# Patient Record
Sex: FEMALE | Race: WHITE | Employment: UNEMPLOYED | RURAL
[De-identification: names, ages, dates, MRNs, and addresses within clinical notes are randomized per-mention and may not be internally consistent; named-entity substitution may affect disease eponyms.]

---

## 2017-04-24 ENCOUNTER — OFFICE VISIT (OUTPATIENT)
Dept: PRIMARY CARE CLINIC | Age: 42
End: 2017-04-24
Payer: COMMERCIAL

## 2017-04-24 VITALS
DIASTOLIC BLOOD PRESSURE: 74 MMHG | SYSTOLIC BLOOD PRESSURE: 128 MMHG | OXYGEN SATURATION: 98 % | HEART RATE: 81 BPM | RESPIRATION RATE: 20 BRPM | BODY MASS INDEX: 35.58 KG/M2 | HEIGHT: 70 IN | WEIGHT: 248.5 LBS

## 2017-04-24 DIAGNOSIS — K59.00 CONSTIPATION, UNSPECIFIED CONSTIPATION TYPE: ICD-10-CM

## 2017-04-24 DIAGNOSIS — G89.29 CHRONIC NONINTRACTABLE HEADACHE, UNSPECIFIED HEADACHE TYPE: ICD-10-CM

## 2017-04-24 DIAGNOSIS — F41.9 ANXIETY: ICD-10-CM

## 2017-04-24 DIAGNOSIS — R51.9 CHRONIC NONINTRACTABLE HEADACHE, UNSPECIFIED HEADACHE TYPE: ICD-10-CM

## 2017-04-24 DIAGNOSIS — F32.89 OTHER DEPRESSION: Primary | ICD-10-CM

## 2017-04-24 PROCEDURE — 99213 OFFICE O/P EST LOW 20 MIN: CPT | Performed by: PEDIATRICS

## 2017-04-24 RX ORDER — BUPROPION HYDROCHLORIDE 150 MG/1
150 TABLET ORAL EVERY MORNING
Qty: 30 TABLET | Refills: 3 | Status: SHIPPED | OUTPATIENT
Start: 2017-04-24 | End: 2017-07-18 | Stop reason: SDUPTHER

## 2017-04-24 RX ORDER — CITALOPRAM 40 MG/1
40 TABLET ORAL DAILY
Qty: 30 TABLET | Refills: 5 | Status: SHIPPED | OUTPATIENT
Start: 2017-04-24 | End: 2017-07-18 | Stop reason: SDUPTHER

## 2017-04-24 RX ORDER — NAPROXEN 500 MG/1
500 TABLET ORAL EVERY 12 HOURS PRN
Qty: 30 TABLET | Refills: 3 | Status: SHIPPED | OUTPATIENT
Start: 2017-04-24 | End: 2018-05-10

## 2017-04-24 RX ORDER — POLYETHYLENE GLYCOL 3350 17 G/17G
17 POWDER, FOR SOLUTION ORAL DAILY
Qty: 1 BOTTLE | Refills: 5 | Status: SHIPPED | OUTPATIENT
Start: 2017-04-24

## 2017-04-24 ASSESSMENT — ENCOUNTER SYMPTOMS
VOMITING: 0
CONSTIPATION: 1
WHEEZING: 0
SCALP TENDERNESS: 0
SHORTNESS OF BREATH: 0
HEMATOCHEZIA: 0
NAUSEA: 0
COUGH: 0
BACK PAIN: 0
SORE THROAT: 0
SINUS PRESSURE: 0
EYE DISCHARGE: 0
ABDOMINAL PAIN: 0

## 2017-07-18 ENCOUNTER — OFFICE VISIT (OUTPATIENT)
Dept: PRIMARY CARE CLINIC | Age: 42
End: 2017-07-18
Payer: COMMERCIAL

## 2017-07-18 VITALS
DIASTOLIC BLOOD PRESSURE: 76 MMHG | HEART RATE: 79 BPM | RESPIRATION RATE: 20 BRPM | TEMPERATURE: 98 F | WEIGHT: 246 LBS | SYSTOLIC BLOOD PRESSURE: 100 MMHG | BODY MASS INDEX: 35.3 KG/M2 | OXYGEN SATURATION: 97 %

## 2017-07-18 DIAGNOSIS — F32.89 OTHER DEPRESSION: ICD-10-CM

## 2017-07-18 DIAGNOSIS — F41.9 ANXIETY: ICD-10-CM

## 2017-07-18 DIAGNOSIS — R51.9 NONINTRACTABLE EPISODIC HEADACHE, UNSPECIFIED HEADACHE TYPE: Primary | ICD-10-CM

## 2017-07-18 PROBLEM — F32.A DEPRESSION: Status: ACTIVE | Noted: 2017-07-18

## 2017-07-18 PROCEDURE — 99213 OFFICE O/P EST LOW 20 MIN: CPT | Performed by: PEDIATRICS

## 2017-07-18 RX ORDER — CITALOPRAM 40 MG/1
40 TABLET ORAL DAILY
Qty: 30 TABLET | Refills: 5 | Status: SHIPPED | OUTPATIENT
Start: 2017-07-18

## 2017-07-18 RX ORDER — BUPROPION HYDROCHLORIDE 150 MG/1
150 TABLET ORAL EVERY MORNING
Qty: 30 TABLET | Refills: 3 | Status: SHIPPED | OUTPATIENT
Start: 2017-07-18 | End: 2018-02-07 | Stop reason: SDUPTHER

## 2017-07-18 ASSESSMENT — ENCOUNTER SYMPTOMS
HEMATOCHEZIA: 0
CONSTIPATION: 1
SCALP TENDERNESS: 0
SHORTNESS OF BREATH: 0
BACK PAIN: 0
WHEEZING: 0
SINUS PRESSURE: 0
EYE DISCHARGE: 0

## 2017-07-18 ASSESSMENT — PATIENT HEALTH QUESTIONNAIRE - PHQ9
2. FEELING DOWN, DEPRESSED OR HOPELESS: 0
SUM OF ALL RESPONSES TO PHQ9 QUESTIONS 1 & 2: 0
SUM OF ALL RESPONSES TO PHQ QUESTIONS 1-9: 0
1. LITTLE INTEREST OR PLEASURE IN DOING THINGS: 0

## 2018-02-07 ENCOUNTER — TELEPHONE (OUTPATIENT)
Dept: PRIMARY CARE CLINIC | Age: 43
End: 2018-02-07

## 2018-02-07 DIAGNOSIS — F32.89 OTHER DEPRESSION: ICD-10-CM

## 2018-02-07 RX ORDER — BUPROPION HYDROCHLORIDE 150 MG/1
150 TABLET ORAL EVERY MORNING
Qty: 30 TABLET | Refills: 0 | Status: SHIPPED | OUTPATIENT
Start: 2018-02-07 | End: 2018-05-10

## 2018-02-07 NOTE — TELEPHONE ENCOUNTER
Pt is wanting refill on Wellbutrin. I explained that she needs to be seen first, but pt stated that her nor her kids have been sick with the flu and that she is not coming in to be seen until some of the sickness has calmed down.

## 2018-05-10 ENCOUNTER — OFFICE VISIT (OUTPATIENT)
Dept: PRIMARY CARE CLINIC | Age: 43
End: 2018-05-10
Payer: COMMERCIAL

## 2018-05-10 VITALS
RESPIRATION RATE: 20 BRPM | OXYGEN SATURATION: 98 % | HEART RATE: 68 BPM | SYSTOLIC BLOOD PRESSURE: 124 MMHG | DIASTOLIC BLOOD PRESSURE: 66 MMHG | WEIGHT: 255 LBS | HEIGHT: 70 IN | BODY MASS INDEX: 36.51 KG/M2

## 2018-05-10 DIAGNOSIS — F41.9 ANXIETY: Primary | ICD-10-CM

## 2018-05-10 DIAGNOSIS — F32.89 OTHER DEPRESSION: ICD-10-CM

## 2018-05-10 DIAGNOSIS — R63.5 WEIGHT GAIN: ICD-10-CM

## 2018-05-10 PROCEDURE — 99213 OFFICE O/P EST LOW 20 MIN: CPT | Performed by: PEDIATRICS

## 2018-05-10 ASSESSMENT — ENCOUNTER SYMPTOMS
BACK PAIN: 0
SINUS PRESSURE: 0
COUGH: 0
WHEEZING: 0
ABDOMINAL PAIN: 0
EYE DISCHARGE: 0
NAUSEA: 0
SHORTNESS OF BREATH: 0
VOMITING: 0
SORE THROAT: 0

## 2018-05-22 ENCOUNTER — OFFICE VISIT (OUTPATIENT)
Dept: PRIMARY CARE CLINIC | Age: 43
End: 2018-05-22
Payer: COMMERCIAL

## 2018-05-22 VITALS
BODY MASS INDEX: 36.51 KG/M2 | SYSTOLIC BLOOD PRESSURE: 121 MMHG | HEART RATE: 75 BPM | HEIGHT: 70 IN | OXYGEN SATURATION: 98 % | DIASTOLIC BLOOD PRESSURE: 72 MMHG | WEIGHT: 255 LBS

## 2018-05-22 DIAGNOSIS — F41.9 ANXIETY: Primary | ICD-10-CM

## 2018-05-22 DIAGNOSIS — E66.9 CLASS 2 OBESITY WITHOUT SERIOUS COMORBIDITY WITH BODY MASS INDEX (BMI) OF 36.0 TO 36.9 IN ADULT, UNSPECIFIED OBESITY TYPE: ICD-10-CM

## 2018-05-22 PROCEDURE — 99213 OFFICE O/P EST LOW 20 MIN: CPT | Performed by: INTERNAL MEDICINE

## 2018-05-22 ASSESSMENT — ENCOUNTER SYMPTOMS
SHORTNESS OF BREATH: 0
SINUS PRESSURE: 0
COUGH: 0
EYE DISCHARGE: 0
ABDOMINAL PAIN: 0
WHEEZING: 0
BACK PAIN: 0
VOMITING: 0
SORE THROAT: 0
NAUSEA: 0

## 2018-06-12 ENCOUNTER — TELEPHONE (OUTPATIENT)
Dept: PRIMARY CARE CLINIC | Age: 43
End: 2018-06-12

## 2018-06-12 DIAGNOSIS — Z12.31 ENCOUNTER FOR SCREENING MAMMOGRAM FOR BREAST CANCER: Primary | ICD-10-CM

## 2018-06-18 ENCOUNTER — HOSPITAL ENCOUNTER (OUTPATIENT)
Dept: GENERAL RADIOLOGY | Age: 43
Discharge: HOME | End: 2018-06-18
Attending: INTERNAL MEDICINE | Admitting: INTERNAL MEDICINE

## 2018-06-18 DIAGNOSIS — Z12.31 ENCOUNTER FOR SCREENING MAMMOGRAM FOR BREAST CANCER: ICD-10-CM

## 2018-12-03 ENCOUNTER — HOSPITAL ENCOUNTER (OUTPATIENT)
Facility: HOSPITAL | Age: 43
Discharge: HOME | End: 2018-12-03
Payer: COMMERCIAL

## 2018-12-03 LAB
A/G RATIO: 1.8 (ref 0.8–2)
ALBUMIN SERPL-MCNC: 4.2 G/DL (ref 3.4–4.8)
ALP BLD-CCNC: 61 U/L (ref 25–100)
ALT SERPL-CCNC: 12 U/L (ref 4–36)
ANION GAP SERPL CALCULATED.3IONS-SCNC: 11 MMOL/L (ref 3–16)
AST SERPL-CCNC: 14 U/L (ref 8–33)
BILIRUB SERPL-MCNC: <0.2 MG/DL (ref 0.3–1.2)
BUN BLDV-MCNC: 14 MG/DL (ref 6–20)
CALCIUM SERPL-MCNC: 9.3 MG/DL (ref 8.5–10.5)
CHLORIDE BLD-SCNC: 103 MMOL/L (ref 98–107)
CO2: 27 MMOL/L (ref 20–30)
CREAT SERPL-MCNC: 0.6 MG/DL (ref 0.4–1.2)
FERRITIN: 141.3 NG/ML (ref 22–322)
GFR AFRICAN AMERICAN: >59
GFR NON-AFRICAN AMERICAN: >60
GLOBULIN: 2.4 G/DL
GLUCOSE BLD-MCNC: 109 MG/DL (ref 74–106)
HCT VFR BLD CALC: 42.7 % (ref 37–47)
HEMOGLOBIN: 13.7 G/DL (ref 11.5–16.5)
IRON: 40 UG/DL (ref 37–145)
MCH RBC QN AUTO: 31 PG (ref 27–32)
MCHC RBC AUTO-ENTMCNC: 32.1 G/DL (ref 31–35)
MCV RBC AUTO: 96.6 FL (ref 80–100)
PDW BLD-RTO: 12.4 % (ref 11–16)
PLATELET # BLD: 302 K/UL (ref 150–400)
PMV BLD AUTO: 9.7 FL (ref 6–10)
POTASSIUM SERPL-SCNC: 3.9 MMOL/L (ref 3.4–5.1)
RBC # BLD: 4.42 M/UL (ref 3.8–5.8)
SODIUM BLD-SCNC: 141 MMOL/L (ref 136–145)
TOTAL PROTEIN: 6.6 G/DL (ref 6.4–8.3)
VITAMIN D 25-HYDROXY: 33.4 (ref 32–100)
WBC # BLD: 6.6 K/UL (ref 4–11)

## 2018-12-03 PROCEDURE — 83921 ORGANIC ACID SINGLE QUANT: CPT

## 2018-12-03 PROCEDURE — 85027 COMPLETE CBC AUTOMATED: CPT

## 2018-12-03 PROCEDURE — 83970 ASSAY OF PARATHORMONE: CPT

## 2018-12-03 PROCEDURE — 83540 ASSAY OF IRON: CPT

## 2018-12-03 PROCEDURE — 82747 ASSAY OF FOLIC ACID RBC: CPT

## 2018-12-03 PROCEDURE — 36415 COLL VENOUS BLD VENIPUNCTURE: CPT

## 2018-12-03 PROCEDURE — 80053 COMPREHEN METABOLIC PANEL: CPT

## 2018-12-03 PROCEDURE — 82306 VITAMIN D 25 HYDROXY: CPT

## 2018-12-03 PROCEDURE — 82728 ASSAY OF FERRITIN: CPT

## 2018-12-04 LAB — PARATHYROID HORMONE INTACT: 77.7 PG/ML (ref 14–72)

## 2018-12-07 LAB
HCT VFR BLD CALC: 42.7 %
METHYLMALONIC ACID: 0.25 UMOL/L (ref 0–0.4)
RBC FOLATE: 367 NG/ML

## 2020-12-18 ENCOUNTER — HOSPITAL ENCOUNTER (OUTPATIENT)
Facility: HOSPITAL | Age: 45
Discharge: HOME | End: 2020-12-18
Payer: COMMERCIAL

## 2020-12-18 LAB
A/G RATIO: 1.9 (ref 0.8–2)
ALBUMIN SERPL-MCNC: 4 G/DL (ref 3.4–4.8)
ALP BLD-CCNC: 56 U/L (ref 25–100)
ALT SERPL-CCNC: 13 U/L (ref 4–36)
ANION GAP SERPL CALCULATED.3IONS-SCNC: 7 MMOL/L (ref 3–16)
AST SERPL-CCNC: 13 U/L (ref 8–33)
BASOPHILS ABSOLUTE: 0 K/UL (ref 0–0.1)
BASOPHILS RELATIVE PERCENT: 0.4 %
BILIRUB SERPL-MCNC: 0.3 MG/DL (ref 0.3–1.2)
BUN BLDV-MCNC: 9 MG/DL (ref 6–20)
CALCIUM SERPL-MCNC: 8.9 MG/DL (ref 8.5–10.5)
CHLORIDE BLD-SCNC: 102 MMOL/L (ref 98–107)
CHOLESTEROL, TOTAL: 218 MG/DL (ref 0–200)
CO2: 31 MMOL/L (ref 20–30)
CREAT SERPL-MCNC: 0.6 MG/DL (ref 0.4–1.2)
EOSINOPHILS ABSOLUTE: 0.1 K/UL (ref 0–0.4)
EOSINOPHILS RELATIVE PERCENT: 1.3 %
FOLATE: 6.41 NG/ML
GFR AFRICAN AMERICAN: >59
GFR NON-AFRICAN AMERICAN: >60
GLOBULIN: 2.1 G/DL
GLUCOSE BLD-MCNC: 87 MG/DL (ref 74–106)
HBA1C MFR BLD: 4.8 %
HCT VFR BLD CALC: 42.8 % (ref 37–47)
HDLC SERPL-MCNC: 44 MG/DL (ref 40–60)
HEMOGLOBIN: 14.3 G/DL (ref 11.5–16.5)
IMMATURE GRANULOCYTES #: 0 K/UL
IMMATURE GRANULOCYTES %: 0.2 % (ref 0–5)
LDL CHOLESTEROL CALCULATED: 141 MG/DL
LYMPHOCYTES ABSOLUTE: 1.3 K/UL (ref 1.5–4)
LYMPHOCYTES RELATIVE PERCENT: 27.6 %
MCH RBC QN AUTO: 32.4 PG (ref 27–32)
MCHC RBC AUTO-ENTMCNC: 33.4 G/DL (ref 31–35)
MCV RBC AUTO: 96.8 FL (ref 80–100)
MONOCYTES ABSOLUTE: 0.3 K/UL (ref 0.2–0.8)
MONOCYTES RELATIVE PERCENT: 5.7 %
NEUTROPHILS ABSOLUTE: 3.1 K/UL (ref 2–7.5)
NEUTROPHILS RELATIVE PERCENT: 64.8 %
PDW BLD-RTO: 12.3 % (ref 11–16)
PLATELET # BLD: 230 K/UL (ref 150–400)
PMV BLD AUTO: 9.2 FL (ref 6–10)
POTASSIUM SERPL-SCNC: 3.6 MMOL/L (ref 3.4–5.1)
RBC # BLD: 4.42 M/UL (ref 3.8–5.8)
RHEUMATOID FACTOR: <10 IU/ML
SEDIMENTATION RATE, ERYTHROCYTE: 10 MM/HR (ref 0–20)
SODIUM BLD-SCNC: 140 MMOL/L (ref 136–145)
TOTAL PROTEIN: 6.1 G/DL (ref 6.4–8.3)
TRIGL SERPL-MCNC: 165 MG/DL (ref 0–249)
TSH SERPL DL<=0.05 MIU/L-ACNC: 2.37 UIU/ML (ref 0.27–4.2)
URIC ACID, SERUM: 4.9 MG/DL (ref 2.5–7.1)
VITAMIN B-12: 764 PG/ML (ref 211–911)
VLDLC SERPL CALC-MCNC: 33 MG/DL
WBC # BLD: 4.7 K/UL (ref 4–11)

## 2020-12-18 PROCEDURE — 80061 LIPID PANEL: CPT

## 2020-12-18 PROCEDURE — 83036 HEMOGLOBIN GLYCOSYLATED A1C: CPT

## 2020-12-18 PROCEDURE — 80053 COMPREHEN METABOLIC PANEL: CPT

## 2020-12-18 PROCEDURE — 82607 VITAMIN B-12: CPT

## 2020-12-18 PROCEDURE — 86038 ANTINUCLEAR ANTIBODIES: CPT

## 2020-12-18 PROCEDURE — 85652 RBC SED RATE AUTOMATED: CPT

## 2020-12-18 PROCEDURE — 84550 ASSAY OF BLOOD/URIC ACID: CPT

## 2020-12-18 PROCEDURE — 85025 COMPLETE CBC W/AUTO DIFF WBC: CPT

## 2020-12-18 PROCEDURE — 82746 ASSAY OF FOLIC ACID SERUM: CPT

## 2020-12-18 PROCEDURE — 84443 ASSAY THYROID STIM HORMONE: CPT

## 2020-12-18 PROCEDURE — 86431 RHEUMATOID FACTOR QUANT: CPT

## 2020-12-18 PROCEDURE — 36415 COLL VENOUS BLD VENIPUNCTURE: CPT

## 2020-12-19 LAB — ANTI-NUCLEAR ANTIBODY (ANA): NEGATIVE

## 2021-04-21 ENCOUNTER — APPOINTMENT (OUTPATIENT)
Dept: CT IMAGING | Facility: HOSPITAL | Age: 46
End: 2021-04-21
Payer: COMMERCIAL

## 2021-04-21 ENCOUNTER — HOSPITAL ENCOUNTER (EMERGENCY)
Facility: HOSPITAL | Age: 46
Discharge: HOME | End: 2021-04-21
Attending: EMERGENCY MEDICINE
Payer: COMMERCIAL

## 2021-04-21 ENCOUNTER — APPOINTMENT (OUTPATIENT)
Dept: GENERAL RADIOLOGY | Facility: HOSPITAL | Age: 46
End: 2021-04-21
Payer: COMMERCIAL

## 2021-04-21 VITALS
DIASTOLIC BLOOD PRESSURE: 55 MMHG | WEIGHT: 200 LBS | HEART RATE: 67 BPM | RESPIRATION RATE: 16 BRPM | HEIGHT: 70 IN | SYSTOLIC BLOOD PRESSURE: 109 MMHG | BODY MASS INDEX: 28.63 KG/M2 | OXYGEN SATURATION: 97 % | TEMPERATURE: 97.7 F

## 2021-04-21 DIAGNOSIS — R07.9 CHEST PAIN, UNSPECIFIED TYPE: Primary | ICD-10-CM

## 2021-04-21 DIAGNOSIS — R10.13 ABDOMINAL PAIN, EPIGASTRIC: ICD-10-CM

## 2021-04-21 LAB
A/G RATIO: 1.4 (ref 0.8–2)
ALBUMIN SERPL-MCNC: 3.8 G/DL (ref 3.4–4.8)
ALP BLD-CCNC: 53 U/L (ref 25–100)
ALT SERPL-CCNC: 10 U/L (ref 4–36)
ANION GAP SERPL CALCULATED.3IONS-SCNC: 12 MMOL/L (ref 3–16)
AST SERPL-CCNC: 14 U/L (ref 8–33)
BASOPHILS ABSOLUTE: 0 K/UL (ref 0–0.1)
BASOPHILS RELATIVE PERCENT: 0.3 %
BILIRUB SERPL-MCNC: <0.2 MG/DL (ref 0.3–1.2)
BUN BLDV-MCNC: 12 MG/DL (ref 6–20)
CALCIUM SERPL-MCNC: 9.4 MG/DL (ref 8.5–10.5)
CHLORIDE BLD-SCNC: 101 MMOL/L (ref 98–107)
CO2: 25 MMOL/L (ref 20–30)
CREAT SERPL-MCNC: 0.7 MG/DL (ref 0.4–1.2)
D DIMER: 0.52 UG/ML FEU (ref 0–0.6)
EOSINOPHILS ABSOLUTE: 0.1 K/UL (ref 0–0.4)
EOSINOPHILS RELATIVE PERCENT: 0.8 %
GFR AFRICAN AMERICAN: >59
GFR NON-AFRICAN AMERICAN: >60
GLOBULIN: 2.7 G/DL
GLUCOSE BLD-MCNC: 140 MG/DL (ref 74–106)
HCT VFR BLD CALC: 39.2 % (ref 37–47)
HEMOGLOBIN: 13.2 G/DL (ref 11.5–16.5)
IMMATURE GRANULOCYTES #: 0 K/UL
IMMATURE GRANULOCYTES %: 0.3 % (ref 0–5)
INR BLD: 0.92 (ref 0.88–1.11)
LIPASE: 25 U/L (ref 5.6–51.3)
LYMPHOCYTES ABSOLUTE: 1.9 K/UL (ref 1.5–4)
LYMPHOCYTES RELATIVE PERCENT: 19.5 %
MCH RBC QN AUTO: 32 PG (ref 27–32)
MCHC RBC AUTO-ENTMCNC: 33.7 G/DL (ref 31–35)
MCV RBC AUTO: 95.1 FL (ref 80–100)
MONOCYTES ABSOLUTE: 0.7 K/UL (ref 0.2–0.8)
MONOCYTES RELATIVE PERCENT: 6.6 %
NEUTROPHILS ABSOLUTE: 7.1 K/UL (ref 2–7.5)
NEUTROPHILS RELATIVE PERCENT: 72.5 %
PDW BLD-RTO: 12.4 % (ref 11–16)
PLATELET # BLD: 253 K/UL (ref 150–400)
PMV BLD AUTO: 9.3 FL (ref 6–10)
POTASSIUM SERPL-SCNC: 3.2 MMOL/L (ref 3.4–5.1)
PRO-BNP: 97 PG/ML (ref 0–900)
PROTHROMBIN TIME: 12 SEC (ref 11.6–13.8)
RBC # BLD: 4.12 M/UL (ref 3.8–5.8)
SODIUM BLD-SCNC: 138 MMOL/L (ref 136–145)
TOTAL PROTEIN: 6.5 G/DL (ref 6.4–8.3)
TROPONIN: <0.3 NG/ML
WBC # BLD: 9.8 K/UL (ref 4–11)

## 2021-04-21 PROCEDURE — 71045 X-RAY EXAM CHEST 1 VIEW: CPT

## 2021-04-21 PROCEDURE — 83690 ASSAY OF LIPASE: CPT

## 2021-04-21 PROCEDURE — 6360000002 HC RX W HCPCS: Performed by: EMERGENCY MEDICINE

## 2021-04-21 PROCEDURE — 85025 COMPLETE CBC W/AUTO DIFF WBC: CPT

## 2021-04-21 PROCEDURE — 74176 CT ABD & PELVIS W/O CONTRAST: CPT

## 2021-04-21 PROCEDURE — 84484 ASSAY OF TROPONIN QUANT: CPT

## 2021-04-21 PROCEDURE — 85610 PROTHROMBIN TIME: CPT

## 2021-04-21 PROCEDURE — 96375 TX/PRO/DX INJ NEW DRUG ADDON: CPT

## 2021-04-21 PROCEDURE — 2580000003 HC RX 258: Performed by: EMERGENCY MEDICINE

## 2021-04-21 PROCEDURE — 96374 THER/PROPH/DIAG INJ IV PUSH: CPT

## 2021-04-21 PROCEDURE — 36415 COLL VENOUS BLD VENIPUNCTURE: CPT

## 2021-04-21 PROCEDURE — 85379 FIBRIN DEGRADATION QUANT: CPT

## 2021-04-21 PROCEDURE — 80053 COMPREHEN METABOLIC PANEL: CPT

## 2021-04-21 PROCEDURE — 6370000000 HC RX 637 (ALT 250 FOR IP): Performed by: EMERGENCY MEDICINE

## 2021-04-21 PROCEDURE — 99284 EMERGENCY DEPT VISIT MOD MDM: CPT

## 2021-04-21 PROCEDURE — 83880 ASSAY OF NATRIURETIC PEPTIDE: CPT

## 2021-04-21 RX ORDER — MORPHINE SULFATE 4 MG/ML
4 INJECTION, SOLUTION INTRAMUSCULAR; INTRAVENOUS ONCE
Status: COMPLETED | OUTPATIENT
Start: 2021-04-21 | End: 2021-04-21

## 2021-04-21 RX ORDER — ASPIRIN 81 MG/1
324 TABLET, CHEWABLE ORAL ONCE
Status: COMPLETED | OUTPATIENT
Start: 2021-04-21 | End: 2021-04-21

## 2021-04-21 RX ORDER — ONDANSETRON 2 MG/ML
4 INJECTION INTRAMUSCULAR; INTRAVENOUS ONCE
Status: COMPLETED | OUTPATIENT
Start: 2021-04-21 | End: 2021-04-21

## 2021-04-21 RX ORDER — 0.9 % SODIUM CHLORIDE 0.9 %
1000 INTRAVENOUS SOLUTION INTRAVENOUS ONCE
Status: COMPLETED | OUTPATIENT
Start: 2021-04-21 | End: 2021-04-21

## 2021-04-21 RX ORDER — DROPERIDOL 2.5 MG/ML
1.25 INJECTION, SOLUTION INTRAMUSCULAR; INTRAVENOUS ONCE
Status: COMPLETED | OUTPATIENT
Start: 2021-04-21 | End: 2021-04-21

## 2021-04-21 RX ADMIN — SODIUM CHLORIDE 1000 ML: 9 INJECTION, SOLUTION INTRAVENOUS at 01:29

## 2021-04-21 RX ADMIN — MORPHINE SULFATE 4 MG: 4 INJECTION, SOLUTION INTRAMUSCULAR; INTRAVENOUS at 01:30

## 2021-04-21 RX ADMIN — LIDOCAINE HYDROCHLORIDE: 20 SOLUTION ORAL; TOPICAL at 01:47

## 2021-04-21 RX ADMIN — DROPERIDOL 1.25 MG: 2.5 INJECTION, SOLUTION INTRAMUSCULAR; INTRAVENOUS at 02:14

## 2021-04-21 RX ADMIN — ASPIRIN 324 MG: 81 TABLET, CHEWABLE ORAL at 01:29

## 2021-04-21 RX ADMIN — ONDANSETRON 4 MG: 2 INJECTION INTRAMUSCULAR; INTRAVENOUS at 01:30

## 2021-04-21 ASSESSMENT — PAIN DESCRIPTION - ORIENTATION
ORIENTATION: MID

## 2021-04-21 ASSESSMENT — PAIN SCALES - GENERAL
PAINLEVEL_OUTOF10: 10

## 2021-04-21 ASSESSMENT — PAIN DESCRIPTION - FREQUENCY: FREQUENCY: CONTINUOUS

## 2021-04-21 ASSESSMENT — PAIN DESCRIPTION - LOCATION
LOCATION: ABDOMEN
LOCATION: ABDOMEN

## 2021-04-21 ASSESSMENT — PAIN DESCRIPTION - PAIN TYPE
TYPE: ACUTE PAIN

## 2021-04-21 ASSESSMENT — PAIN DESCRIPTION - DESCRIPTORS
DESCRIPTORS: SHOOTING
DESCRIPTORS: SHOOTING

## 2021-04-21 NOTE — ED PROVIDER NOTES
[address] [place] [address] Court  eMERGENCY dEPARTMENT eNCOUnter      Pt Name: Jacquelyn Forrest  MRN: [id number]  [place]: 1975  Date of evaluation: 2/67/7805  Provider: Christian Junior MD    [address] [place] [address]       Chief Complaint   Patient presents with    Emesis    Chest Pain     mid-sternal to epigastric area         HISTORY OF PRESENT ILLNESS  (Location/Symptom, Timing/Onset, Context/Setting, Quality, Duration,Modifying Factors, Severity.)   Jacquelyn Forrest is a 50 y.o. female who presents to the emergency department with the onset of chest pain that started 2 hours prior to arrival.  Pain is been constant since the onset. Pain is \"sharp\" in nature. Pain radiated to her back between her shoulder blades. She had associated shortness of breath, nausea, and was dry heaving. No similar symptoms in the past.    No prior cardiac stress test or cath. No hypertension  No diabetes  No high cholesterol  No smoking  No premature cardiac history. Patient has a history of a vertical sleeve gastrectomy. Nursing notes were reviewed. REVIEW OF SYSTEMS    (2-9 systems for level 4, 10 or more for level 5)   ROS:  General:  No fevers, no chills, no weakness  Cardiovascular:  + chest pain, no palpitations  Respiratory:  + shortness of breath, no cough, no wheezing  Gastrointestinal:  + pain, + nausea, + dry heaves; no vomiting, no diarrhea  Musculoskeletal:  No muscle pain, no joint pain  Skin:  No rash, no easy bruising  Neurologic:  No speech problems, no headache, no extremity numbness, no extremity  tingling, no extremity weakness  Psychiatric:  + anxiety  Genitourinary:  No dysuria, no hematuria    Except as noted above the remainder of the review of systems was reviewed and negative.        PAST MEDICAL HISTORY     Past Medical History:   Diagnosis Date    Anxiety          SURGICAL HISTORY       Past Surgical History:   Procedure Laterality Date    APPENDECTOMY      HYSTERECTOMY      partial  KNEE SURGERY Bilateral     TYMPANOSTOMY TUBE PLACEMENT Bilateral          CURRENT MEDICATIONS       Previous Medications    CITALOPRAM (CELEXA) 40 MG TABLET    Take 1 tablet by mouth daily    POLYETHYLENE GLYCOL (GLYCOLAX) POWDER    Take 17 g by mouth daily       ALLERGIES     Patient has no known allergies.     FAMILY HISTORY       Family History   Problem Relation Age of Onset    Heart Disease Brother         enlarged heart    Cancer Maternal Grandfather           SOCIAL HISTORY       Social History     Socioeconomic History    Marital status:      Spouse name: None    Number of children: None    Years of education: None    Highest education level: None   Occupational History    None   Social Needs    Financial resource strain: None    Food insecurity     Worry: None     Inability: None    Transportation needs     Medical: None     Non-medical: None   Tobacco Use    Smoking status: Former Smoker     Packs/day: 2.00     Years: 10.00     Pack years: 20.00     Types: Cigarettes     Start date: 1/2/1999    Smokeless tobacco: Never Used   Substance and Sexual Activity    Alcohol use: No    Drug use: No    Sexual activity: None   Lifestyle    Physical activity     Days per week: None     Minutes per session: None    Stress: None   Relationships    Social connections     Talks on phone: None     Gets together: None     Attends Confucianism service: None     Active member of club or organization: None     Attends meetings of clubs or organizations: None     Relationship status: None    Intimate partner violence     Fear of current or ex partner: None     Emotionally abused: None     Physically abused: None     Forced sexual activity: None   Other Topics Concern    None   Social History Narrative    None         PHYSICAL EXAM    (up to 7 for level 4, 8 or more for level 5)     ED Triage Vitals [04/21/21 0114]   BP Temp Temp Source Pulse Resp SpO2 Height Weight   (!) 101/47 97.7 °F (36.5 °C) Result Value    Potassium 3.2 (*)     Glucose 140 (*)     Total Bilirubin <0.2 (*)     All other components within normal limits    Narrative:     Performed at:  [facility]  [address] [place], [place] [address], [place] [address]   Phone [phone]   BRAIN NATRIURETIC PEPTIDE    Narrative:     Performed at:  [address] [place] [address] Laboratory  [address] [place], [place] [address], [place] [address]   Phone [phone]   CBC WITH AUTO DIFFERENTIAL    Narrative:     Performed at:  [address] [place] [address] Laboratory  [address] [place], [place] [address], [place] [address]   Phone [phone]   D-DIMER, QUANTITATIVE    Narrative:     Performed at:  [address] [place] [address] Laboratory  [address] [place] [address],  [place], [place] [address]   Phone [phone]   LIPASE    Narrative:     Performed at:  [address] [place] [address] Laboratory  [address] [place] [address],  [place], [place] [address]   Phone [phone]   PROTIME-INR    Narrative:     Performed at:  [address] [place] [address] Laboratory  [address] [place], [place] [address], [place] [address]   Phone [phone]   TROPONIN    Narrative:     Performed at:  [address] [place] [address] Laboratory  [address] [place], [place] [address], [place] [address]   Phone [phone]       I have reviewed and interpreted all ofthe currently available lab results from this visit (if applicable):  Results for orders placed or performed during the hospital encounter of 04/21/21   Brain Natriuretic Peptide   Result Value Ref Range    Pro-BNP 97 0 - 900 pg/mL   CBC Auto Differential   Result Value Ref Range    WBC 9.8 4.0 - 11.0 K/uL    RBC 4.12 3.80 - 5.80 M/uL    Hemoglobin 13.2 11.5 - 16.5 g/dL    Hematocrit 39.2 37.0 - 47.0 %    MCV 95.1 80.0 - 100.0 fL    MCH 32.0 27.0 - 32.0 pg    MCHC 33.7 31.0 - 35.0 g/dL    RDW 12.4 11.0 - 16.0 %    Platelets 109 204 - 662 K/uL    MPV 9.3 6.0 - 10.0 fL Neutrophils % 72.5 %    Immature Granulocytes % 0.3 0.0 - 5.0 %    Lymphocytes % 19.5 %    Monocytes % 6.6 %    Eosinophils % 0.8 %    Basophils % 0.3 %    Neutrophils Absolute 7.1 2.0 - 7.5 K/uL    Immature Granulocytes # 0.0 K/uL    Lymphocytes Absolute 1.9 1.5 - 4.0 K/uL    Monocytes Absolute 0.7 0.2 - 0.8 K/uL    Eosinophils Absolute 0.1 0.0 - 0.4 K/uL    Basophils Absolute 0.0 0.0 - 0.1 K/uL   Comprehensive Metabolic Panel   Result Value Ref Range    Sodium 138 136 - 145 mmol/L    Potassium 3.2 (L) 3.4 - 5.1 mmol/L    Chloride 101 98 - 107 mmol/L    CO2 25 20 - 30 mmol/L    Anion Gap 12 3 - 16    Glucose 140 (H) 74 - 106 mg/dL    BUN 12 6 - 20 mg/dL    CREATININE 0.7 0.4 - 1.2 mg/dL    GFR Non-African American >60 >59    GFR African American >59 >59    Calcium 9.4 8.5 - 10.5 mg/dL    Total Protein 6.5 6.4 - 8.3 g/dL    Albumin 3.8 3.4 - 4.8 g/dL    Albumin/Globulin Ratio 1.4 0.8 - 2.0    Total Bilirubin <0.2 (L) 0.3 - 1.2 mg/dL    Alkaline Phosphatase 53 25 - 100 U/L    ALT 10 4 - 36 U/L    AST 14 8 - 33 U/L    Globulin 2.7 g/dL   D-Dimer, Quantitative   Result Value Ref Range    D-Dimer, Quant 0.52 0.00 - 0.60 ug/mL FEU   Lipase   Result Value Ref Range    Lipase 25.0 5.6 - 51.3 U/L   Protime-INR   Result Value Ref Range    Protime 12.0 11.6 - 13.8 sec    INR 0.92 0.88 - 1.11   Troponin   Result Value Ref Range    Troponin <0.30 <0.30 ng/mL        All other labs were within normal range or not returned as of this dictation. EMERGENCY DEPARTMENT COURSE and DIFFERENTIAL DIAGNOSIS/MDM:   Vitals:  Vitals:    04/21/21 0242 04/21/21 0245 04/21/21 0248 04/21/21 0300   BP:  (!) 112/59 117/60 117/60   Pulse:  66 66 67   Resp: 16      Temp:       TempSrc:       SpO2: 96% 97% 97% 97%   Weight:       Height:           Patient was given morphine without relief of her chest pain. She was then given a GI cocktail without improvement of her pain. Patient continued to complain of 10/10 pain.     The patient will follow-up with their PCP in 1-2 days for reevaluation. If the patient or family members have any further concerns or any worsening symptoms they will return to the ED for reevaluation. CONSULTS:  None    PROCEDURES:  Procedures    CRITICAL CARE TIME    Total Critical Care time was 0 minutes, excluding separately reportable procedures. There was a high probability of clinically significant/life threatening deterioration in the patient's condition which required my urgent intervention. FINAL IMPRESSION      1. Chest pain, unspecified type    2. Abdominal pain, epigastric          DISPOSITION/PLAN   DISPOSITION        PATIENT REFERRED TO:  Georgina Yang MD  [address] [place] [address]  [phone]    Schedule an appointment as soon as possible for a visit in 2 days  As needed      DISCHARGE MEDICATIONS:  New Prescriptions    No medications on file       Comment: Please note this report has been produced using speech recognition software and may contain errorsrelated to that system including errors in grammar, punctuation, and spelling, as well as words and phrases that may be inappropriate. If there are any questions or concerns please feel free to contact the dictating providerfor clarification.     Suzanne Pringle MD  Attending Emergency Physician                Suzanne Pringle MD  04/21/21 9845

## 2021-04-23 ENCOUNTER — APPOINTMENT (OUTPATIENT)
Dept: CT IMAGING | Facility: HOSPITAL | Age: 46
End: 2021-04-23
Payer: COMMERCIAL

## 2021-04-23 ENCOUNTER — NURSE TRIAGE (OUTPATIENT)
Dept: OTHER | Facility: CLINIC | Age: 46
End: 2021-04-23

## 2021-04-23 ENCOUNTER — HOSPITAL ENCOUNTER (EMERGENCY)
Facility: HOSPITAL | Age: 46
Discharge: ANOTHER HOSPITAL | End: 2021-04-23
Attending: HOSPITALIST
Payer: COMMERCIAL

## 2021-04-23 ENCOUNTER — APPOINTMENT (OUTPATIENT)
Dept: ULTRASOUND IMAGING | Facility: HOSPITAL | Age: 46
End: 2021-04-23
Payer: COMMERCIAL

## 2021-04-23 VITALS
HEART RATE: 61 BPM | DIASTOLIC BLOOD PRESSURE: 56 MMHG | SYSTOLIC BLOOD PRESSURE: 119 MMHG | TEMPERATURE: 98 F | WEIGHT: 200 LBS | OXYGEN SATURATION: 96 % | BODY MASS INDEX: 28.63 KG/M2 | RESPIRATION RATE: 18 BRPM | HEIGHT: 70 IN

## 2021-04-23 DIAGNOSIS — K81.0 ACUTE CHOLECYSTITIS: Primary | ICD-10-CM

## 2021-04-23 LAB
A/G RATIO: 1.4 (ref 0.8–2)
ALBUMIN SERPL-MCNC: 3.8 G/DL (ref 3.4–4.8)
ALP BLD-CCNC: 54 U/L (ref 25–100)
ALT SERPL-CCNC: 13 U/L (ref 4–36)
ANION GAP SERPL CALCULATED.3IONS-SCNC: 11 MMOL/L (ref 3–16)
AST SERPL-CCNC: 13 U/L (ref 8–33)
BASOPHILS ABSOLUTE: 0 K/UL (ref 0–0.1)
BASOPHILS RELATIVE PERCENT: 0.6 %
BILIRUB SERPL-MCNC: 0.5 MG/DL (ref 0.3–1.2)
BILIRUBIN URINE: ABNORMAL
BLOOD, URINE: NEGATIVE
BUN BLDV-MCNC: 10 MG/DL (ref 6–20)
CALCIUM SERPL-MCNC: 8.9 MG/DL (ref 8.5–10.5)
CHLORIDE BLD-SCNC: 100 MMOL/L (ref 98–107)
CLARITY: CLEAR
CO2: 27 MMOL/L (ref 20–30)
COLOR: YELLOW
CREAT SERPL-MCNC: 0.6 MG/DL (ref 0.4–1.2)
EKG ATRIAL RATE: 58 BPM
EKG DIAGNOSIS: NORMAL
EKG P AXIS: 26 DEGREES
EKG P-R INTERVAL: 172 MS
EKG Q-T INTERVAL: 436 MS
EKG QRS DURATION: 88 MS
EKG QTC CALCULATION (BAZETT): 428 MS
EKG R AXIS: 20 DEGREES
EKG T AXIS: 30 DEGREES
EKG VENTRICULAR RATE: 58 BPM
EOSINOPHILS ABSOLUTE: 0 K/UL (ref 0–0.4)
EOSINOPHILS RELATIVE PERCENT: 0.6 %
GFR AFRICAN AMERICAN: >59
GFR NON-AFRICAN AMERICAN: >60
GLOBULIN: 2.8 G/DL
GLUCOSE BLD-MCNC: 88 MG/DL (ref 74–106)
GLUCOSE URINE: NEGATIVE MG/DL
HCT VFR BLD CALC: 40.9 % (ref 37–47)
HEMOGLOBIN: 13.5 G/DL (ref 11.5–16.5)
IMMATURE GRANULOCYTES #: 0 K/UL
IMMATURE GRANULOCYTES %: 0.4 % (ref 0–5)
KETONES, URINE: 80 MG/DL
LEUKOCYTE ESTERASE, URINE: NEGATIVE
LIPASE: 13 U/L (ref 5.6–51.3)
LYMPHOCYTES ABSOLUTE: 1.1 K/UL (ref 1.5–4)
LYMPHOCYTES RELATIVE PERCENT: 15.4 %
MAGNESIUM: 1.9 MG/DL (ref 1.7–2.4)
MCH RBC QN AUTO: 31.6 PG (ref 27–32)
MCHC RBC AUTO-ENTMCNC: 33 G/DL (ref 31–35)
MCV RBC AUTO: 95.8 FL (ref 80–100)
MICROSCOPIC EXAMINATION: ABNORMAL
MONOCYTES ABSOLUTE: 0.5 K/UL (ref 0.2–0.8)
MONOCYTES RELATIVE PERCENT: 7.4 %
NEUTROPHILS ABSOLUTE: 5.3 K/UL (ref 2–7.5)
NEUTROPHILS RELATIVE PERCENT: 75.6 %
NITRITE, URINE: NEGATIVE
OCCULT BLOOD DIAGNOSTIC: NORMAL
PDW BLD-RTO: 12.2 % (ref 11–16)
PH UA: 6.5 (ref 5–8)
PLATELET # BLD: 231 K/UL (ref 150–400)
PMV BLD AUTO: 9.3 FL (ref 6–10)
POTASSIUM REFLEX MAGNESIUM: 3.4 MMOL/L (ref 3.4–5.1)
PROTEIN UA: NEGATIVE MG/DL
RBC # BLD: 4.27 M/UL (ref 3.8–5.8)
SARS-COV-2, NAAT: NOT DETECTED
SODIUM BLD-SCNC: 138 MMOL/L (ref 136–145)
SPECIFIC GRAVITY UA: 1.01 (ref 1–1.03)
TOTAL PROTEIN: 6.6 G/DL (ref 6.4–8.3)
URINE REFLEX TO CULTURE: ABNORMAL
URINE TYPE: ABNORMAL
UROBILINOGEN, URINE: 0.2 E.U./DL
WBC # BLD: 7 K/UL (ref 4–11)

## 2021-04-23 PROCEDURE — 87635 SARS-COV-2 COVID-19 AMP PRB: CPT

## 2021-04-23 PROCEDURE — 6360000004 HC RX CONTRAST MEDICATION: Performed by: HOSPITALIST

## 2021-04-23 PROCEDURE — 93005 ELECTROCARDIOGRAM TRACING: CPT

## 2021-04-23 PROCEDURE — 6360000002 HC RX W HCPCS: Performed by: HOSPITALIST

## 2021-04-23 PROCEDURE — 96375 TX/PRO/DX INJ NEW DRUG ADDON: CPT

## 2021-04-23 PROCEDURE — 2580000003 HC RX 258: Performed by: HOSPITALIST

## 2021-04-23 PROCEDURE — 99283 EMERGENCY DEPT VISIT LOW MDM: CPT

## 2021-04-23 PROCEDURE — 83735 ASSAY OF MAGNESIUM: CPT

## 2021-04-23 PROCEDURE — 36415 COLL VENOUS BLD VENIPUNCTURE: CPT

## 2021-04-23 PROCEDURE — G0328 FECAL BLOOD SCRN IMMUNOASSAY: HCPCS

## 2021-04-23 PROCEDURE — 76705 ECHO EXAM OF ABDOMEN: CPT

## 2021-04-23 PROCEDURE — 83690 ASSAY OF LIPASE: CPT

## 2021-04-23 PROCEDURE — 96376 TX/PRO/DX INJ SAME DRUG ADON: CPT

## 2021-04-23 PROCEDURE — 96365 THER/PROPH/DIAG IV INF INIT: CPT

## 2021-04-23 PROCEDURE — 81003 URINALYSIS AUTO W/O SCOPE: CPT

## 2021-04-23 PROCEDURE — 85025 COMPLETE CBC W/AUTO DIFF WBC: CPT

## 2021-04-23 PROCEDURE — 74177 CT ABD & PELVIS W/CONTRAST: CPT

## 2021-04-23 PROCEDURE — 2500000003 HC RX 250 WO HCPCS: Performed by: HOSPITALIST

## 2021-04-23 PROCEDURE — 80053 COMPREHEN METABOLIC PANEL: CPT

## 2021-04-23 PROCEDURE — 6370000000 HC RX 637 (ALT 250 FOR IP): Performed by: HOSPITALIST

## 2021-04-23 RX ORDER — 0.9 % SODIUM CHLORIDE 0.9 %
1000 INTRAVENOUS SOLUTION INTRAVENOUS ONCE
Status: COMPLETED | OUTPATIENT
Start: 2021-04-23 | End: 2021-04-23

## 2021-04-23 RX ORDER — SIMETHICONE 80 MG
80 TABLET,CHEWABLE ORAL EVERY 6 HOURS PRN
Status: DISCONTINUED | OUTPATIENT
Start: 2021-04-23 | End: 2021-04-23 | Stop reason: HOSPADM

## 2021-04-23 RX ORDER — MORPHINE SULFATE 4 MG/ML
4 INJECTION, SOLUTION INTRAMUSCULAR; INTRAVENOUS EVERY 30 MIN PRN
Status: DISCONTINUED | OUTPATIENT
Start: 2021-04-23 | End: 2021-04-23 | Stop reason: HOSPADM

## 2021-04-23 RX ORDER — ONDANSETRON 2 MG/ML
4 INJECTION INTRAMUSCULAR; INTRAVENOUS EVERY 30 MIN PRN
Status: DISCONTINUED | OUTPATIENT
Start: 2021-04-23 | End: 2021-04-23 | Stop reason: HOSPADM

## 2021-04-23 RX ADMIN — SIMETHICONE 80 MG: 80 TABLET, CHEWABLE ORAL at 12:45

## 2021-04-23 RX ADMIN — PIPERACILLIN AND TAZOBACTAM 3375 MG: 3; .375 INJECTION, POWDER, FOR SOLUTION INTRAVENOUS at 18:13

## 2021-04-23 RX ADMIN — SODIUM CHLORIDE 1000 ML: 9 INJECTION, SOLUTION INTRAVENOUS at 12:44

## 2021-04-23 RX ADMIN — MORPHINE SULFATE 4 MG: 4 INJECTION, SOLUTION INTRAMUSCULAR; INTRAVENOUS at 15:52

## 2021-04-23 RX ADMIN — MORPHINE SULFATE 4 MG: 4 INJECTION, SOLUTION INTRAMUSCULAR; INTRAVENOUS at 18:16

## 2021-04-23 RX ADMIN — ONDANSETRON 4 MG: 2 INJECTION INTRAMUSCULAR; INTRAVENOUS at 12:45

## 2021-04-23 RX ADMIN — MORPHINE SULFATE 4 MG: 4 INJECTION, SOLUTION INTRAMUSCULAR; INTRAVENOUS at 12:45

## 2021-04-23 RX ADMIN — FAMOTIDINE 20 MG: 10 INJECTION, SOLUTION INTRAVENOUS at 12:44

## 2021-04-23 RX ADMIN — IOPAMIDOL 100 ML: 755 INJECTION, SOLUTION INTRAVENOUS at 12:57

## 2021-04-23 ASSESSMENT — PAIN SCALES - GENERAL
PAINLEVEL_OUTOF10: 8
PAINLEVEL_OUTOF10: 8
PAINLEVEL_OUTOF10: 6

## 2021-04-23 ASSESSMENT — PAIN DESCRIPTION - DESCRIPTORS: DESCRIPTORS: SORE

## 2021-04-23 ASSESSMENT — PAIN DESCRIPTION - PAIN TYPE: TYPE: ACUTE PAIN

## 2021-04-23 ASSESSMENT — PAIN DESCRIPTION - LOCATION: LOCATION: ABDOMEN

## 2021-04-23 NOTE — ED NOTES
Patient in route to [facility]       Danny Hdez RN  04/23/21 3919

## 2021-04-23 NOTE — ED TRIAGE NOTES
Patient states that she was here previously this week and states at that time she thought she was having a heart attack. Patient states that she then started having abdominal pain. Patient states that she took laxatives yesterday that she only goes once a week and she thought that would help. Patient states that after she went she noticed that her stool was black. Patient states that she also feels bloated.

## 2021-04-23 NOTE — ED NOTES
I spoke with Aidee Crenshaw, RN at [facility] in regards to Dr. Fany Willis speaking to the on call surgeon at [facility]. She will page them now.  LIDIA, RRT

## 2021-04-23 NOTE — ED PROVIDER NOTES
[address] [facility] ENCOUNTER      Pt Name: Lorraine Sarkar  MRN: [id number]  YOB: 1975  Date of evaluation: 4/23/2021  Provider: Rosa Kraft, [facility]       Chief Complaint   Patient presents with    Abdominal Pain         HISTORY OF PRESENT ILLNESS  (Location/Symptom, Timing/Onset, Context/Setting, Quality, Duration, Modifying Factors, Severity.)   Lorraine Sarkar is a 50 y.o. female who presents to the emergency department for abdominal pain. Patient was evaluated here on 4/21/2021 for chest pain which the patient states she was actually having abdominal pain at that time but her chest pain was more worrisome so she did not mention it. Patient states that really has not improved since then. She had sensation of bloating which she has not taken anything for and states it has improved slightly. Patient advises that she had a gastric sleeve procedure performed about 3 years ago. Patient states that she felt like she had been constipated because she not had a bowel movement of her couple of days. She states this is not out of the ordinary for her she sort of goes between not having bowel movements to having bowel movements. She states that she is iqra sometimes if she has a bowel movement once a week. She took some laxative over-the-counter generic medication to see if she could help have a bowel movement. She states when she took the medication and had a bowel movement it was dark and black looking. She denies any lightheadedness or dizziness. Denies any sensation of palpitations. Patient's evaluation here for chest pain was negative and she was discharged home. She does have a history of a cardiomyopathy which she states her brother passed away from. Patient denies any history of GERD or reflux in the past.  She denies any trauma or injury to the abdominal area. Denies any dysuria or change urinary frequency.   Denies any numbness tingling weakness of the extremities out of ordinary. Patient states that the pain is about 8 out of 10 and is more just a general achy dull sensation that can become sharp. Nothing that the patient is done is made her symptoms any better or worse. She states that she feels like she may have ripped or torn something in there. Patient states that she does feel like she is slightly short of breath but that she is secondary to the abdominal pain because she does not want to take a deep breath. Nursing notes were reviewed. REVIEW OFSYSTEMS    (2-9 systems for level 4, 10 or more for level 5)   ROS:  General:  No fevers, no chills, no weakness  Cardiovascular:  No chest pain, no palpitations  Respiratory:  + shortness of breath-secondary to abdominal pain, no cough, no wheezing  Gastrointestinal:  +pain-epigastric, +nausea, no vomiting, no diarrhea, + black stool  Musculoskeletal:  No muscle pain, no joint pain  Skin:  No rash, no easy bruising  Neurologic:  No speech problems, no headache, no extremity weakness  Psychiatric:  No anxiety  Genitourinary:  No dysuria, no hematuria    Except as noted above the remainder of the review of systems was reviewed and negative. PAST MEDICAL HISTORY     Past Medical History:   Diagnosis Date    Anxiety          SURGICAL HISTORY       Past Surgical History:   Procedure Laterality Date    APPENDECTOMY      HYSTERECTOMY      partial    KNEE SURGERY Bilateral     TYMPANOSTOMY TUBE PLACEMENT Bilateral          CURRENT MEDICATIONS       Previous Medications    CITALOPRAM (CELEXA) 40 MG TABLET    Take 1 tablet by mouth daily    POLYETHYLENE GLYCOL (GLYCOLAX) POWDER    Take 17 g by mouth daily       ALLERGIES     Patient has no known allergies.     FAMILY HISTORY       Family History   Problem Relation Age of Onset    Heart Disease Brother         enlarged heart    Cancer Maternal Grandfather           SOCIAL HISTORY       Social History     Socioeconomic History  Marital status:      Spouse name: None    Number of children: None    Years of education: None    Highest education level: None   Occupational History    None   Social Needs    Financial resource strain: None    Food insecurity     Worry: None     Inability: None    Transportation needs     Medical: None     Non-medical: None   Tobacco Use    Smoking status: Former Smoker     Packs/day: 2.00     Years: 10.00     Pack years: 20.00     Types: Cigarettes     Start date: 1/2/1999    Smokeless tobacco: Never Used   Substance and Sexual Activity    Alcohol use: No    Drug use: No    Sexual activity: None   Lifestyle    Physical activity     Days per week: None     Minutes per session: None    Stress: None   Relationships    Social connections     Talks on phone: None     Gets together: None     Attends Tenriism service: None     Active member of club or organization: None     Attends meetings of clubs or organizations: None     Relationship status: None    Intimate partner violence     Fear of current or ex partner: None     Emotionally abused: None     Physically abused: None     Forced sexual activity: None   Other Topics Concern    None   Social History Narrative    None         PHYSICAL EXAM    (up to 7 for level 4, 8 or more for level 5)     ED Triage Vitals [04/23/21 1159]   BP Temp Temp Source Pulse Resp SpO2 Height Weight   124/63 98 °F (36.7 °C) Oral 68 18 98 % 5' 10\" (1.778 m) 200 lb (90.7 kg)       Physical Exam  General :Patient is awake, alert, oriented, in no acute distress, nontoxic appearing  HEENT: Pupils are equally round and reactive to light, EOMI, conjunctivae clear. Oral mucosa is moist, no exudate. Uvula is midline  Cardiac: Heart regular rate, rhythm, no murmurs, rubs, or gallops  Lungs: Lungs are clear to auscultation, there is no wheezing, rhonchi, or rales. There is no use of accessory muscles.   Abdomen: Abdomen is soft, mild palpable discomfort to the entire abdomen but does seem to be more focalized to the epigastric area, nondistended. There is no firm or pulsatile masses, no rebound rigidity or guarding. Musculoskeletal: 5 out of 5 strength in all 4 extremities. No focal muscle deficits are appreciated  Neuro: Motor intact, sensory intact, level of consciousness is normal  Dermatology: Skin is warm and dry  Psych: Mentation is grossly normal, cognition is grossly normal. Affect is appropriate. RECTAL: Positive external hemorrhoid but no bleeding, no erythema. There is no tenderness to palpation. Rectal tone is strong. Stool is brown and heme Negative. Control is positive. DIAGNOSTIC RESULTS     EKG: All EKG's are interpreted by the Emergency Department Physician who either signs or Co-signs this chart in the [address] a cardiologist.    The EKG interpreted by me shows sinus bradycardia. Ventricular rate 58 bpm, OH interval is 172 ms, QRS durations 88 ms, QT/QTc is 436/428 ms. No acute T wave inversions concerning for acute myocardial ischemia. No ST elevations concerning for acute myocardial infarction. RADIOLOGY:   Non-plain film images such as CT, Ultrasound and MRI are read by the radiologist. Plain radiographic images are visualized and preliminarily interpreted by the emergency physician with the below findings:      ? Radiologist's Report Reviewed:  US ABDOMEN LIMITED   Final Result      Calculus cholecystitis without biliary duct dilatation. CT ABDOMEN PELVIS W IV CONTRAST Additional Contrast? None   Final Result   1. Findings are suggestive of acute cholecystitis. Further evaluation can be obtained with ultrasound of the right upper quadrant. This finding was called to ER physician Dr. Chucky Black.    2. Left adnexal cyst.   3. A 1.2 cm Sclerotic lesion in the left acetabulum needs further evaluation with MRI of the left hip without and with contrast. The lesion has appearance of bone island however given the significant increase in size additional imaging will be    beneficial.            ED BEDSIDE ULTRASOUND:   Performed by ED Physician - none    LABS:    I have reviewed and interpreted all of the currently available lab results from this visit (ifapplicable):  Results for orders placed or performed during the hospital encounter of 04/23/21   CBC Auto Differential   Result Value Ref Range    WBC 7.0 4.0 - 11.0 K/uL    RBC 4.27 3.80 - 5.80 M/uL    Hemoglobin 13.5 11.5 - 16.5 g/dL    Hematocrit 40.9 37.0 - 47.0 %    MCV 95.8 80.0 - 100.0 fL    MCH 31.6 27.0 - 32.0 pg    MCHC 33.0 31.0 - 35.0 g/dL    RDW 12.2 11.0 - 16.0 %    Platelets 795 780 - 204 K/uL    MPV 9.3 6.0 - 10.0 fL    Neutrophils % 75.6 %    Immature Granulocytes % 0.4 0.0 - 5.0 %    Lymphocytes % 15.4 %    Monocytes % 7.4 %    Eosinophils % 0.6 %    Basophils % 0.6 %    Neutrophils Absolute 5.3 2.0 - 7.5 K/uL    Immature Granulocytes # 0.0 K/uL    Lymphocytes Absolute 1.1 (L) 1.5 - 4.0 K/uL    Monocytes Absolute 0.5 0.2 - 0.8 K/uL    Eosinophils Absolute 0.0 0.0 - 0.4 K/uL    Basophils Absolute 0.0 0.0 - 0.1 K/uL   Comprehensive Metabolic Panel w/ Reflex to MG   Result Value Ref Range    Sodium 138 136 - 145 mmol/L    Potassium reflex Magnesium 3.4 3.4 - 5.1 mmol/L    Chloride 100 98 - 107 mmol/L    CO2 27 20 - 30 mmol/L    Anion Gap 11 3 - 16    Glucose 88 74 - 106 mg/dL    BUN 10 6 - 20 mg/dL    CREATININE 0.6 0.4 - 1.2 mg/dL    GFR Non-African American >60 >59    GFR African American >59 >59    Calcium 8.9 8.5 - 10.5 mg/dL    Total Protein 6.6 6.4 - 8.3 g/dL    Albumin 3.8 3.4 - 4.8 g/dL    Albumin/Globulin Ratio 1.4 0.8 - 2.0    Total Bilirubin 0.5 0.3 - 1.2 mg/dL    Alkaline Phosphatase 54 25 - 100 U/L    ALT 13 4 - 36 U/L    AST 13 8 - 33 U/L    Globulin 2.8 g/dL   Lipase   Result Value Ref Range    Lipase 13.0 5.6 - 51.3 U/L   Urinalysis Reflex to Culture    Specimen: Urine, clean catch   Result Value Ref Range    Color, UA Yellow Straw/Yellow    Clarity, UA Clear Clear    Glucose, Ur ultrasound consistent with acute cholecystitis with stones but there is no biliary dilation. After discussion with the patient she is agreeable for me to contact surgeon at [facility] whoever is on-call discussed the case with them to see if this something that would require transfer for evaluation by a surgeon immediately or if something that could follow-up in the clinic as an outpatient. We will call and discussed the case with on-call surgeon at [facility]. Case discussed with Dr. Carter Lemos and was agreeable to see the patient in his clinic on Monday that was advised that he was on call all weekend that if she her symptoms worsened or gotten a bind she could present to the emergency department there at [facility] since he was on-call all weekend and they could admit her at that time. However he advised that if the patient felt that this was not something that she felt appropriate or could not do that he would try to get her admitted and transferred there this evening and possibly take the gallbladder out tomorrow however after he found out the patient's had gastric sleeve surgery he states that they do not perform those types of procedures and causes complications so she would actually be better off going somewhere where they actually manage a gastric bypass/sleeve individual so he recommended [facility] [place] or possibly [facility]. The case was discussed with the OR nurse for Dr. Kishor Escobedo who was in the OR performing surgery. She took the information down discussed the case with him he declined to accept her at [facility] [place] since Dr. Candy Faith was the physician who did her surgery and he has moved to the [address] [place] [address] so he recommended that she try to be transferred there however if there is difficulty with getting her transfer they advised for us to try to call back to see if he be willing to accept her if [facility] was unable to accept her.   We will call the [facility] and try to get surgery on-call for [address] to discuss this case with them. Case was discussed with Dr. Haylee Bingham they was able to find a bed at [facility] and accepted in ER to ER transfer there. He did recommend that we give her a dose of Zosyn IV prior to transfer. We will also check a rapid Covid screen on the patient. Patient's radiological diagnostic studies will accompany her at time of transfer. She was excepted by Dr. Haylee Bingham as the accepting physician for ER to ER transfer to [facility] for acute cholecystitis. CONSULTS:  None    PROCEDURES:  Procedures    CRITICAL CARE TIME    Total Critical Care time was 0 minutes, excluding separately reportable procedures. There was a high probability of clinically significant/life threatening deterioration in the patient's condition which required my urgent intervention. FINAL IMPRESSION      1. Acute cholecystitis          DISPOSITION/PLAN   DISPOSITION        PATIENT REFERRED TO:  No follow-up provider specified. DISCHARGE MEDICATIONS:  New Prescriptions    No medications on file       Comment: Please note this report has been produced using speech recognition software and may contain errorsrelated to that system including errors in grammar, punctuation, and spelling, as well as words and phrases that may be inappropriate. If there are any questions or concerns please feel free to contact the dictating providerfor clarification.     Latia Shay DO  Attending Emergency Physician              Latia Shay DO  04/23/21 9896

## 2021-04-23 NOTE — ED NOTES
[address] per Dr. Haja Aguilar request- spoke with Kinga Reyes RN and request they reach out to [address] [place] [address] about transfer- awaiting return call     Aden Haines RN  04/23/21 2558

## 2021-04-23 NOTE — ED NOTES
Report to Julia Concepcion, RN at [address] [place] [address].      Reji Coleman RN  21 [phone]

## 2021-05-21 ENCOUNTER — HOSPITAL ENCOUNTER (OUTPATIENT)
Dept: GENERAL RADIOLOGY | Facility: HOSPITAL | Age: 46
Discharge: HOME | End: 2021-05-21
Payer: COMMERCIAL

## 2021-05-21 ENCOUNTER — HOSPITAL ENCOUNTER (OUTPATIENT)
Facility: HOSPITAL | Age: 46
Discharge: HOME | End: 2021-05-21
Payer: COMMERCIAL

## 2021-05-21 DIAGNOSIS — M25.512 LEFT SHOULDER PAIN, UNSPECIFIED CHRONICITY: ICD-10-CM

## 2021-05-21 PROCEDURE — 73030 X-RAY EXAM OF SHOULDER: CPT

## 2021-06-11 ENCOUNTER — HOSPITAL ENCOUNTER (OUTPATIENT)
Dept: MRI IMAGING | Facility: HOSPITAL | Age: 46
Discharge: HOME | End: 2021-06-11
Payer: COMMERCIAL

## 2021-06-11 DIAGNOSIS — M19.012 PRIMARY OSTEOARTHRITIS OF LEFT SHOULDER: ICD-10-CM

## 2021-06-11 DIAGNOSIS — M25.512 LEFT SHOULDER PAIN, UNSPECIFIED CHRONICITY: ICD-10-CM

## 2021-06-11 PROCEDURE — 73221 MRI JOINT UPR EXTREM W/O DYE: CPT

## 2021-07-20 ENCOUNTER — HOSPITAL ENCOUNTER (OUTPATIENT)
Dept: PHYSICAL THERAPY | Facility: HOSPITAL | Age: 46
Setting detail: THERAPIES SERIES
Discharge: HOME | End: 2021-07-20
Payer: COMMERCIAL

## 2021-07-20 PROCEDURE — 97110 THERAPEUTIC EXERCISES: CPT

## 2021-07-20 PROCEDURE — 97161 PT EVAL LOW COMPLEX 20 MIN: CPT

## 2021-07-20 ASSESSMENT — PAIN DESCRIPTION - LOCATION: LOCATION: SHOULDER

## 2021-07-20 ASSESSMENT — PAIN DESCRIPTION - ORIENTATION: ORIENTATION: LEFT

## 2021-07-20 ASSESSMENT — PAIN SCALES - GENERAL: PAINLEVEL_OUTOF10: 7

## 2021-07-20 NOTE — PROGRESS NOTES
Physical Therapy  Initial Assessment  Date: 2021  Patient Name: Lico Area  MRN: [id number]  : 1975     Treatment Diagnosis: L shoulder adhesive capsulitis    Subjective   General  Chart Reviewed: Yes  Patient assessed for rehabilitation services?: Yes  Referring Practitioner: Fior Downs MD  Referral Date : 21  Diagnosis: Left frozen shoulder  PT Visit Information  PT Insurance Information: BCBS  Subjective  Subjective: Pt reports having L shoulder pain start at the end of 2021. Pt feels her initial injury was due to moving her shoulder abruptly while sleeping. Pt states her ROM is limited and has constant ache in her shoulder. Pt was seen by orthopedics and had a cortisone injection on 21 which she feels helped with her ROM mildly. She notes having an allergic reaction to the cortisone injection with itching / burning in the region. Pain Screening  Patient Currently in Pain: Yes  Pain Assessment  Pain Assessment: 0-10  Pain Level: 7 (pain can increase with sudden movements.   Pain at lowest is 4/10)  Pain Location: Shoulder  Pain Orientation: Left  Vital Signs  Patient Currently in Pain: Yes    Orientation  Orientation  Overall Orientation Status: Within Normal Limits    Objective     Observation/Palpation  Palpation: grade II tenderness posterior L shoulder  Observation: mild rounded shoulders, L scapular dyskinesia due to capsular tightness    AROM LUE (degrees)  LUE AROM : Exceptions  L Shoulder Flexion 0-180: 0-77 deg  L Shoulder ABduction 0-180: 0-65 deg  L Shoulder Int Rotation  0-70: 0-50 deg at 0 deg abd  L Shoulder Ext Rotation  0-90: 0-11 deg at 0 deg abd    Strength LUE  Strength LUE: Exception  L Shoulder Flexion: 3-/5  L Shoulder ABduction: 3-/5  L Shoulder Internal Rotation: 4+/5  L Shoulder External Rotation: 4-/5     Additional Measures  Special Tests: Quick DASH: 66%        Exercises  Exercise 1: Pendulums x30 cw/ccw  Exercise 2: Scapular retraction 3x10  Exercise 3: Table dusting flexion 3x10  Exercise 4: Wand ER / IR x30  Exercise 5: Wand abd 3x10  Exercise 6: UT stretch 5x10\"   Pt was educated in and issued a written HEP of the above exercises. Plan to add next visit:  Manual therapy: L shoulder grade II-III mobs all planes, PROM  Pulleys: flexion 2 x 2' (may try standing to start)  Ice to L shoulder x 10'       Assessment   Conditions Requiring Skilled Therapeutic Intervention  Body structures, Functions, Activity limitations: Decreased ROM; Decreased strength;Decreased high-level IADLs; Increased pain  Assessment: Pt will benefit from skilled PT to address L shoulder deficits consistent with adhesive capsulitis to restore optimal functional level. Treatment Diagnosis: L shoulder adhesive capsulitis  Prognosis: Good  Decision Making: Low Complexity  REQUIRES PT FOLLOW UP: Yes  Activity Tolerance  Activity Tolerance: Patient Tolerated treatment well         Plan   Plan  Times per week: 2x/week  Plan weeks: 8 weeks  Current Treatment Recommendations: Strengthening, Home Exercise Program, Manual Therapy - Soft Tissue Mobilization, ROM, Patient/Caregiver Education & Training, Manual Therapy - Joint Manipulation, Modalities      Goals  Short term goals  Time Frame for Short term goals: 4 weeks  Short term goal 1: Pt to be I with HEP  Short term goal 2: Pt to demonstrate L shoulder flexion AROM of 0-110 deg with minimal scapular substitution. Short term goal 3: Pt to perform daily activities with pain of less than 5/10. Short term goal 4: Pt to demonstrate L shoulder ER PROM of 0-25 deg at 0 deg abd. Long term goals  Time Frame for Long term goals : 8 weeks  Long term goal 1: Quick DASH score to improve to 20% or less indicating improved function. Long term goal 2: Pt to demonstrate 4+/5 or greater L shoulder strength grossly with no pain upon assessment.   Long term goal 3: Pt to demonstrate L shoulder flexion and abd AROM of 0-150 deg or greater to improve daily function. Long term goal 4: Pt to perform daily activities with pain of 2/10 or less at greatest.  Long term goal 5: Pt to demonstrate ER and IR AAROM of 0-60 deg or greater at 90 deg abd. Poncho Gonzalez, PT     Certification of Medical Necessity: It will be understood that this treatment plan is certified medically necessary by the documenting therapist and referring physician mentioned in this report. Unless the physician indicated otherwise through written correspondence with our office, all further referrals will act as certification of medical necessity on this treatment plan. Thank you for this referral.  If you have questions regarding this plan of care, please call [phone].           Revisions to this plan (optional):                     Please sign and return this plan to:   FAX: [phone]      Signature:                                 Date:

## 2021-07-22 ENCOUNTER — HOSPITAL ENCOUNTER (OUTPATIENT)
Dept: PHYSICAL THERAPY | Facility: HOSPITAL | Age: 46
Setting detail: THERAPIES SERIES
Discharge: HOME | End: 2021-07-22
Payer: COMMERCIAL

## 2021-07-22 PROCEDURE — 97140 MANUAL THERAPY 1/> REGIONS: CPT

## 2021-07-22 PROCEDURE — 97110 THERAPEUTIC EXERCISES: CPT

## 2021-07-22 NOTE — FLOWSHEET NOTE
Physical Therapy Daily Treatment Note   Date:  2021    TIme In:  1057                    Time Out:  1200    Patient Name:  Erika Booth    :  1975  MRN: [id number]    Restrictions/Precautions:    Pertinent Medical History:  Medical/Treatment Diagnosis Information:  ·   L shoulder adhesive capsulitis  ·    Insurance/Certification information:    [facility]   Physician Information:    Gill English MD  Plan of care signed (Y/N):    Visit# / total visits:     2/    G-Code (if applicable):      Date / Visit # G-Code Applied:         Progress Note: []  Yes  [x]  No  Next due by: Visit #10      Pain level:   4/10  Subjective: Pt reports she has been doing her HEP; no new reports since IE. Objective:   Observation:    Test measurements:     Palpation:    Exercises:  Exercise Resistance/Repetitions Other comments   Pendulums x30 cw/ccw 22   Scapular retraction 3x10 22   Table dusting flexion 3x10 22   Wand ER / IR x30 22   Wand abd 3x10 22   UT stretch  5x10\" 22   Pulleys 2' x 2 22                              Other Therapeutic Activities:      Manual Treatments:  L shoulder grade II-III mobs all planes, PROM x15'    Modalities: Ice pack L shoulder x 10'      Timed Code Treatment Minutes: 50       Total Treatment Minutes:  63    Treatment/Activity Tolerance:  [x] Patient tolerated treatment well [] Patient limited by fatigue  [] Patient limited by pain  [] Patient limited by other medical complications  [x] Other: Pt progressed through activity well and tolerated increase in ROM during manual therapy.     Pain after treatment:      2/10    Prognosis: [x] Good [] Fair  [] Poor    Patient Requires Follow-up: [x] Yes  [] No    Plan:   [x] Continue per plan of care [] Alter current plan (see comments)  [] Plan of care initiated [] Hold pending MD visit [] Discharge    Plan for Next Session:        Electronically signed by:  Poncho Gonzalez PT

## 2021-07-27 ENCOUNTER — HOSPITAL ENCOUNTER (OUTPATIENT)
Dept: PHYSICAL THERAPY | Facility: HOSPITAL | Age: 46
Setting detail: THERAPIES SERIES
Discharge: HOME | End: 2021-07-27
Payer: COMMERCIAL

## 2021-07-27 PROCEDURE — 97110 THERAPEUTIC EXERCISES: CPT

## 2021-07-27 PROCEDURE — 97140 MANUAL THERAPY 1/> REGIONS: CPT

## 2021-07-27 NOTE — FLOWSHEET NOTE
Physical Therapy Daily Treatment Note   Date:  2021    TIme In:        3308              Time Out:  56    Patient Name:  Estelita Cornejo    :  1975  MRN: [id number]    Restrictions/Precautions:    Pertinent Medical History:  Medical/Treatment Diagnosis Information:  ·   L shoulder adhesive capsulitis     Insurance/Certification information:    BCBS   Physician Information:    Tess Sanchez MD  Plan of care signed (Y/N):    Visit# / total visits:     3 /    G-Code (if applicable):      Date / Visit # G-Code Applied:         Progress Note: []  Yes  [x]  No  Next due by: Visit #10      Pain level:   5 /10    Subjective:   Pt reports she has been doing her HEP; feels her ROM is some better     Objective:   Observation:    Test measurements:     Palpation:    Exercises:  Exercise Resistance/Repetitions Other comments   Pendulums x30 cw/ccw 27   Scapular retraction 3x10 27   Table dusting flexion 3x10 27   Wand ER / IR x30 27   Wand abd 3x10 27   UT stretch  5x10\" 27   Pulleys 2' x 2 27        Supine FF  AA/PROM 3 x 10 27                    Other Therapeutic Activities:      Manual Treatments:  L shoulder grade II-III mobs all planes, PROM x15'    Modalities: Ice pack L shoulder x 10'      Timed Code Treatment Minutes: 40'      Total Treatment Minutes:  64'    Treatment/Activity Tolerance:  [x] Patient tolerated treatment well [] Patient limited by fatigue  [] Patient limited by pain  [] Patient limited by other medical complications  [x] Other: Pt progressed through activity well and tolerated increase in ROM during manual therapy.     Pain after treatment:     4  /10    Prognosis: [x] Good [] Fair  [] Poor    Patient Requires Follow-up: [x] Yes  [] No    Plan:   [x] Continue per plan of care [] Alter current plan (see comments)  [] Plan of care initiated [] Hold pending MD visit [] Discharge    Plan for Next Session:        Electronically signed by:  Haris Cruz PT

## 2021-07-29 ENCOUNTER — HOSPITAL ENCOUNTER (OUTPATIENT)
Dept: PHYSICAL THERAPY | Facility: HOSPITAL | Age: 46
Setting detail: THERAPIES SERIES
Discharge: HOME | End: 2021-07-29
Payer: COMMERCIAL

## 2021-07-29 PROCEDURE — 97110 THERAPEUTIC EXERCISES: CPT

## 2021-07-29 PROCEDURE — 97140 MANUAL THERAPY 1/> REGIONS: CPT

## 2021-07-29 NOTE — FLOWSHEET NOTE
Physical Therapy Daily Treatment Note   Date:  2021    TIme In:        0507             Time Out:  1    Patient Name:  Walker Lisa    :  1975  MRN: [id number]    Restrictions/Precautions:    Pertinent Medical History:  Medical/Treatment Diagnosis Information:  ·   L shoulder adhesive capsulitis     Insurance/Certification information:    [facility]   Physician Information:    Park Self MD  Plan of care signed (Y/N):    Visit# / total visits:     4 /    G-Code (if applicable):      Date / Visit # G-Code Applied:         Progress Note: []  Yes  [x]  No  Next due by: Visit #10      Pain level:   4 /10    Subjective:   Pt reports she has been doing her HEP; feels her ROM is some better     Objective:   Observation:    Test measurements:     Palpation:    Exercises:  Exercise Resistance/Repetitions Other comments   Pendulums x30 cw/ccw 29   Scapular retraction 3x10 29   Table dusting flexion 3x10 29   Wand ER / IR x30 29   Wand abd 3x10 29   UT stretch  5x10\" 29   Pulleys 2' x 2 29        Supine FF  AA/PROM 3 x 10 29                    Other Therapeutic Activities:      Manual Treatments:  L shoulder grade II-III mobs all planes, PROM x15'    Modalities: Ice pack L shoulder x 10'      Timed Code Treatment Minutes:   40'      Total Treatment Minutes:  62'    Treatment/Activity Tolerance:  [x] Patient tolerated treatment well [] Patient limited by fatigue  [] Patient limited by pain  [] Patient limited by other medical complications  [x] Other: Pt progressed through activity well and tolerated increase in ROM during manual therapy.     Pain after treatment:     3 /10    Prognosis: [x] Good [] Fair  [] Poor    Patient Requires Follow-up: [x] Yes  [] No    Plan:   [x] Continue per plan of care [] Alter current plan (see comments)  [] Plan of care initiated [] Hold pending MD visit [] Discharge    Plan for Next Session:        Electronically signed by:  Farhat Collins PT

## 2021-08-03 ENCOUNTER — APPOINTMENT (OUTPATIENT)
Dept: PHYSICAL THERAPY | Facility: HOSPITAL | Age: 46
End: 2021-08-03
Payer: COMMERCIAL

## 2021-08-05 ENCOUNTER — HOSPITAL ENCOUNTER (OUTPATIENT)
Dept: PHYSICAL THERAPY | Facility: HOSPITAL | Age: 46
Setting detail: THERAPIES SERIES
Discharge: HOME | End: 2021-08-05
Payer: COMMERCIAL

## 2021-08-05 PROCEDURE — 97110 THERAPEUTIC EXERCISES: CPT

## 2021-08-05 PROCEDURE — 97140 MANUAL THERAPY 1/> REGIONS: CPT

## 2021-08-10 ENCOUNTER — HOSPITAL ENCOUNTER (OUTPATIENT)
Dept: PHYSICAL THERAPY | Facility: HOSPITAL | Age: 46
Setting detail: THERAPIES SERIES
Discharge: HOME | End: 2021-08-10
Payer: COMMERCIAL

## 2021-08-10 PROCEDURE — 97140 MANUAL THERAPY 1/> REGIONS: CPT

## 2021-08-10 PROCEDURE — 97110 THERAPEUTIC EXERCISES: CPT

## 2021-08-10 NOTE — FLOWSHEET NOTE
Physical Therapy Daily Treatment Note   Date:  8/10/2021    TIme In:    926             Time Out:  46    Patient Name:  Estelita Cornejo    :  1975  MRN: [id number]    Restrictions/Precautions:    Pertinent Medical History:  Medical/Treatment Diagnosis Information:  ·   L shoulder adhesive capsulitis     Insurance/Certification information:    [employer]   Physician Information:    Tess Sanchez MD  Plan of care signed (Y/N):    Visit# / total visits:     5/    G-Code (if applicable):      Date / Visit # G-Code Applied:         Progress Note: []  Yes  [x]  No  Next due by: Visit #10      Pain level:   3-4 /10    Subjective:   Pt reports: no new c/o or changes to note; mild-moderate pain     Objective:   Observation:    Test measurements:     Palpation:    Exercises:  Exercise Resistance/Repetitions Other comments   Pendulums x30 cw/ccw 10   Scapular retraction 3x10 10   Table dusting flexion 3x10 10   Wand ER / IR 2x30 10   Wand abd 3x10 10   UT stretch  5x10\" 10   Pulleys 2' x 3 10        Supine FF  AA/PROM 3 x 10 10        Begin gentle isometrics: ext, IR, ER * 3\" x 15           Other Therapeutic Activities:      Manual Treatments:  L shoulder grade II-III mobs all planes, PROM x15'    Modalities: Ice pack L shoulder x 10'      Timed Code Treatment Minutes:   43 '      Total Treatment Minutes:   54 '    Treatment/Activity Tolerance:  [x] Patient tolerated treatment well [] Patient limited by fatigue  [] Patient limited by pain  [] Patient limited by other medical complications  [x] Other: Pt is making good improvement with ROM with improved freedom of movement especially in flexion and abduction.     Pain after treatment:     2 /10    Prognosis: [x] Good [] Fair  [] Poor    Patient Requires Follow-up: [x] Yes  [] No    Plan:   [x] Continue per plan of care [] Alter current plan (see comments)  [] Plan of care initiated [] Hold pending MD visit [] Discharge    Plan for Next Session:        Electronically signed by:  Arvind Jaime, PT

## 2021-08-12 ENCOUNTER — HOSPITAL ENCOUNTER (OUTPATIENT)
Dept: PHYSICAL THERAPY | Facility: HOSPITAL | Age: 46
Setting detail: THERAPIES SERIES
Discharge: HOME | End: 2021-08-12
Payer: COMMERCIAL

## 2021-08-12 PROCEDURE — 97110 THERAPEUTIC EXERCISES: CPT

## 2021-08-12 PROCEDURE — 97140 MANUAL THERAPY 1/> REGIONS: CPT

## 2021-08-12 NOTE — FLOWSHEET NOTE
Physical Therapy Daily Treatment Note   Date:  2021    TIme In:    1708             Time Out:  1033    Patient Name:  Trinidad Bowie    :  1975  MRN: [id number]    Restrictions/Precautions:    Pertinent Medical History:  Medical/Treatment Diagnosis Information:  ·   L shoulder adhesive capsulitis     Insurance/Certification information:    [facility]   Physician Information:    Savi Ortiz MD  Plan of care signed (Y/N):    Visit# / total visits:     7/    G-Code (if applicable):      Date / Visit # G-Code Applied:         Progress Note: []  Yes  [x]  No  Next due by: Visit #10      Pain level:   2/10    Subjective:   Pt reports: having low pain levels today; continues to see improvement. Objective:   Observation:    Test measurements:     Palpation:    Exercises:  Exercise Resistance/Repetitions Other comments   Pendulums x30 cw/ccw 12   Scapular retraction 3x10 12   Table dusting flexion 3x10 12   Wand ER / IR 2x30 12   Wand abd 3x10 12   UT stretch  5x10\" 12   Pulleys 2' x 3 12        Supine FF  AA/PROM 3 x 10 12   Gentle isometrics: ext, IR, ER 3\" x 15 12               Other Therapeutic Activities:      Manual Treatments:  L shoulder grade II-III mobs all planes, PROM x15'    Modalities:  MH x 10 min prior to manual.  Ice pack L shoulder x 10' -ice not performed today      Timed Code Treatment Minutes:   39'      Total Treatment Minutes:   61'    Treatment/Activity Tolerance:  [x] Patient tolerated treatment well [] Patient limited by fatigue  [] Patient limited by pain  [] Patient limited by other medical complications  [x] Other: MH was applied to L shoulder prior to manual therapy which improved joint movement and tissue pliability during manual therapy. Pt continues to be most limited with ER ROM but is responding well to current POC.      Pain after treatment:     3/10    Prognosis: [x] Good [] Fair  [] Poor    Patient Requires Follow-up: [x] Yes  [] No    Plan:   [x] Continue per plan of care [] Alter current plan (see comments)  [] Plan of care initiated [] Hold pending MD visit [] Discharge    Plan for Next Session:        Electronically signed by:  Jerica Mccann PT

## 2021-08-17 ENCOUNTER — HOSPITAL ENCOUNTER (OUTPATIENT)
Dept: PHYSICAL THERAPY | Facility: HOSPITAL | Age: 46
Setting detail: THERAPIES SERIES
Discharge: HOME | End: 2021-08-17
Payer: COMMERCIAL

## 2021-08-17 PROCEDURE — 97110 THERAPEUTIC EXERCISES: CPT

## 2021-08-17 PROCEDURE — 97140 MANUAL THERAPY 1/> REGIONS: CPT

## 2021-08-17 NOTE — FLOWSHEET NOTE
Physical Therapy Daily Treatment Note / Re-Assessment   Date:  2021    TIme In:    [address]             Time Out:      Patient Name:  Preston White    :  1975  MRN: [id number]    Restrictions/Precautions:    Pertinent Medical History:  Medical/Treatment Diagnosis Information:  ·   L shoulder adhesive capsulitis     Insurance/Certification information:    [facility]   Physician Information:    Vik Lee MD  Plan of care signed (Y/N):    Visit# / total visits:     8/    G-Code (if applicable):      Date / Visit # G-Code Applied:         Progress Note: [x]  Yes  []  No  Next due by: 21     Pain level:   2/10    Subjective:   Pt reports: having low pain levels today; continues to see improvement. Objective:   Observation:    Test measurements:  Quick DASH: 25%  L shoulder: MMT  MMT    Flexion: 4/5  0-127 deg  Abduction: 4/5  0-116 deg  ER:  4-/5  0-37 deg @ 0 deg abd, 0-15 deg @ 60 deg abd  IR:  5/5  0-51 deg @ 60 deg abd    Palpation:    Exercises:  Exercise Resistance/Repetitions Other comments   Pendulums x30 cw/ccw 17   Scapular retraction 3x10 17   Table dusting flexion 3x10 17   Wand ER / IR 2x30 17   Wand abd 3x10 17   UT stretch  5x10\" 17   Pulleys 2' x 3 17        Supine FF  AA/PROM 3 x 10 17   Gentle isometrics: ext, IR, ER 3\" x 15 17               Other Therapeutic Activities:      Manual Treatments:  L shoulder grade II-III mobs all planes, PROM x15'    Modalities:  MH x 10 min prior to manual.  Ice pack L shoulder x 10' to finish      Timed Code Treatment Minutes:   48'      Total Treatment Minutes:   68'    Treatment/Activity Tolerance:  [x] Patient tolerated treatment well [] Patient limited by fatigue  [] Patient limited by pain  [] Patient limited by other medical complications  [x] Other: Pt has responded well to PT intervention with significant improvement in ROM and functional score. She also reports overall decrease in pain in her shoulder pain since starting PT.   She will continue to benefit from skilled PT to further address shoulder deficits and restore optimal functional level. Pt has met all STG at this time. Pain after treatment:     2/10    Prognosis: [x] Good [] Fair  [] Poor    Patient Requires Follow-up: [x] Yes  [] No    Plan:   [x] Continue per plan of care [] Alter current plan (see comments)  [] Plan of care initiated [] Hold pending MD visit [] Discharge    Plan for Next Session:      Goals  Short term goals  Time Frame for Short term goals: 4 weeks  Short term goal 1: Pt to be I with HEP -MET  Short term goal 2: Pt to demonstrate L shoulder flexion AROM of 0-110 deg with minimal scapular substitution. -MET  Short term goal 3: Pt to perform daily activities with pain of less than 5/10. -MET  Short term goal 4: Pt to demonstrate L shoulder ER PROM of 0-25 deg at 0 deg abd. -MET  Long term goals  Time Frame for Long term goals : 8 weeks  Long term goal 1: Quick DASH score to improve to 20% or less indicating improved function. Long term goal 2: Pt to demonstrate 4+/5 or greater L shoulder strength grossly with no pain upon assessment. Long term goal 3: Pt to demonstrate L shoulder flexion and abd AROM of 0-150 deg or greater to improve daily function.   Long term goal 4: Pt to perform daily activities with pain of 2/10 or less at greatest.  Long term goal 5: Pt to demonstrate ER and IR AAROM of 0-60 deg or greater at 90 deg abd.       Electronically signed by:  Temo Santiago, PT

## 2021-08-19 ENCOUNTER — HOSPITAL ENCOUNTER (OUTPATIENT)
Dept: PHYSICAL THERAPY | Facility: HOSPITAL | Age: 46
Setting detail: THERAPIES SERIES
Discharge: HOME | End: 2021-08-19
Payer: COMMERCIAL

## 2021-08-19 PROCEDURE — 97110 THERAPEUTIC EXERCISES: CPT

## 2021-08-19 PROCEDURE — 97140 MANUAL THERAPY 1/> REGIONS: CPT

## 2021-08-19 NOTE — FLOWSHEET NOTE
Physical Therapy Daily Treatment Note   Date:  2021    TIme In:    0906             Time Out:  80    Patient Name:  Walker Lisa    :  1975  MRN: [id number]    Restrictions/Precautions:    Pertinent Medical History:  Medical/Treatment Diagnosis Information:  ·   L shoulder adhesive capsulitis     Insurance/Certification information:    [facility]   Physician Information:    Park Self MD  Plan of care signed (Y/N):    Visit# / total visits:     9 /    G-Code (if applicable):      Date / Visit # G-Code Applied:         Progress Note: []  Yes  [x]  No  Next due by: 21     Pain level:   3 /10    Subjective:   Pt reports: having low but slightly higher pain today than on last visit.      Objective:   Observation:    Test measurements:  Quick DASH: 25%  L shoulder: MMT  MMT    Flexion: 4/5  0-127 deg  Abduction: 4/5  0-116 deg  ER:  4-/5  0-37 deg @ 0 deg abd, 0-15 deg @ 60 deg abd  IR:  5/5  0-51 deg @ 60 deg abd    Palpation:    Exercises:  Exercise Resistance/Repetitions Other comments   Pendulums x30 cw/ccw 19   Scapular retraction 3x10 19   Table dusting flexion 3x10 19   Wand ER / IR 2x30 19   Wand abd 3x10 19   UT stretch  5x10\" 19   Pulleys 2' x 3 19        Supine FF  AA/PROM 3 x 10 19   Gentle isometrics: ext, IR, ER 3\" x 15 19               Other Therapeutic Activities:      Manual Treatments:  L shoulder grade II-III mobs all planes, PROM x15'    Modalities:  MH x 10 min prior to manual.  Ice pack L shoulder x 10' to finish      Timed Code Treatment Minutes:    48'      Total Treatment Minutes:    67'    Treatment/Activity Tolerance:  [x] Patient tolerated treatment well [] Patient limited by fatigue  [] Patient limited by pain  [] Patient limited by other medical complications  [x] Other:      Pain after treatment:     3 /10    Prognosis: [x] Good [] Fair  [] Poor    Patient Requires Follow-up: [x] Yes  [] No    Plan:   [x] Continue per plan of care [] Alter current plan (see comments)  [] Plan of care initiated [] Hold pending MD visit [] Discharge    Plan for Next Session:      Goals  Short term goals  Time Frame for Short term goals: 4 weeks  Short term goal 1: Pt to be I with HEP -MET  Short term goal 2: Pt to demonstrate L shoulder flexion AROM of 0-110 deg with minimal scapular substitution. -MET  Short term goal 3: Pt to perform daily activities with pain of less than 5/10. -MET  Short term goal 4: Pt to demonstrate L shoulder ER PROM of 0-25 deg at 0 deg abd. -MET  Long term goals  Time Frame for Long term goals : 8 weeks  Long term goal 1: Quick DASH score to improve to 20% or less indicating improved function. Long term goal 2: Pt to demonstrate 4+/5 or greater L shoulder strength grossly with no pain upon assessment. Long term goal 3: Pt to demonstrate L shoulder flexion and abd AROM of 0-150 deg or greater to improve daily function.   Long term goal 4: Pt to perform daily activities with pain of 2/10 or less at greatest.  Long term goal 5: Pt to demonstrate ER and IR AAROM of 0-60 deg or greater at 90 deg abd.       Electronically signed by:  Dany Quiros, PT

## 2021-08-24 ENCOUNTER — APPOINTMENT (OUTPATIENT)
Dept: PHYSICAL THERAPY | Facility: HOSPITAL | Age: 46
End: 2021-08-24
Payer: COMMERCIAL

## 2021-08-31 ENCOUNTER — HOSPITAL ENCOUNTER (OUTPATIENT)
Dept: PHYSICAL THERAPY | Facility: HOSPITAL | Age: 46
Setting detail: THERAPIES SERIES
Discharge: HOME | End: 2021-08-31
Payer: COMMERCIAL

## 2021-08-31 PROCEDURE — 97140 MANUAL THERAPY 1/> REGIONS: CPT

## 2021-08-31 PROCEDURE — 97110 THERAPEUTIC EXERCISES: CPT

## 2021-08-31 NOTE — FLOWSHEET NOTE
Physical Therapy Daily Treatment Note   Date:  2021    TIme In:   0932              Time Out:  9501    Patient Name:  Kait Angel    :  1975  MRN: [id number]    Restrictions/Precautions:    Pertinent Medical History:  Medical/Treatment Diagnosis Information:  ·   L shoulder adhesive capsulitis     Insurance/Certification information:    [facility]   Physician Information:    Jill Ha MD  Plan of care signed (Y/N):    Visit# / total visits:     10 /    G-Code (if applicable):      Date / Visit # G-Code Applied:         Progress Note: []  Yes  [x]  No  Next due by: 21     Pain level:   3 /10    Subjective:   Pt reports: she had to miss a couple of weeks of PT due to her child being quarantined from [email or web address] exposure. She notes her shoulder continues to improve but still has stiffness and pain at end ROM. Objective:   Observation:    Test measurements:  Quick DASH: 25%  L shoulder: MMT  MMT    Flexion: 4/5  0-127 deg  Abduction: 4/5  0-116 deg  ER:  4-/5  0-37 deg @ 0 deg abd, 0-15 deg @ 60 deg abd  IR:  5/5  0-51 deg @ 60 deg abd    Palpation:    Exercises:  Exercise Resistance/Repetitions Other comments   Pendulums x30 cw/ccw 31   Scapular retraction 3x10 31   Wall dusting flexion 3x10 31   Wand ER / IR 2x30 31   Wand abd 3x10 31   UT stretch  5x10\" 31   Pulleys 2' x 3 31        Supine FF  AA/PROM 3 x 10 31   Gentle isometrics: ext, IR, ER 3\" x 15 31               Other Therapeutic Activities:      Manual Treatments:  L shoulder grade II-III mobs all planes, PROM x15'    Modalities:  MH x 10 min prior to manual. - MH no performed today, add next visit.    Ice pack L shoulder x 10' to finish      Timed Code Treatment Minutes:    36'      Total Treatment Minutes:    64'    Treatment/Activity Tolerance:  [x] Patient tolerated treatment well [] Patient limited by fatigue  [] Patient limited by pain  [] Patient limited by other medical complications  [x] Other:  Pt demonstrates pain especially at end ROM flexion and ER but overall continues to improve with ROM and function. Pain after treatment:      2/10    Prognosis: [x] Good [] Fair  [] Poor    Patient Requires Follow-up: [x] Yes  [] No    Plan:   [x] Continue per plan of care [] Alter current plan (see comments)  [] Plan of care initiated [] Hold pending MD visit [] Discharge    Plan for Next Session:      Goals  Short term goals  Time Frame for Short term goals: 4 weeks  Short term goal 1: Pt to be I with HEP -MET  Short term goal 2: Pt to demonstrate L shoulder flexion AROM of 0-110 deg with minimal scapular substitution. -MET  Short term goal 3: Pt to perform daily activities with pain of less than 5/10. -MET  Short term goal 4: Pt to demonstrate L shoulder ER PROM of 0-25 deg at 0 deg abd. -MET  Long term goals  Time Frame for Long term goals : 8 weeks  Long term goal 1: Quick DASH score to improve to 20% or less indicating improved function. Long term goal 2: Pt to demonstrate 4+/5 or greater L shoulder strength grossly with no pain upon assessment. Long term goal 3: Pt to demonstrate L shoulder flexion and abd AROM of 0-150 deg or greater to improve daily function.   Long term goal 4: Pt to perform daily activities with pain of 2/10 or less at greatest.  Long term goal 5: Pt to demonstrate ER and IR AAROM of 0-60 deg or greater at 90 deg abd.       Electronically signed by:  Gokul Naejra, PT

## 2021-09-02 ENCOUNTER — HOSPITAL ENCOUNTER (OUTPATIENT)
Dept: PHYSICAL THERAPY | Facility: HOSPITAL | Age: 46
Setting detail: THERAPIES SERIES
Discharge: HOME | End: 2021-09-02
Payer: COMMERCIAL

## 2021-09-02 PROCEDURE — 97110 THERAPEUTIC EXERCISES: CPT

## 2021-09-02 PROCEDURE — 97140 MANUAL THERAPY 1/> REGIONS: CPT

## 2021-09-02 NOTE — FLOWSHEET NOTE
Physical Therapy Daily Treatment Note   Date:  2021    TIme In:    0935             Time Out:  9934    Patient Name:  Jana Morales    :  1975  MRN: [id number]    Restrictions/Precautions:    Pertinent Medical History:  Medical/Treatment Diagnosis Information:  ·   L shoulder adhesive capsulitis     Insurance/Certification information:    [facility]   Physician Information:    Adarsh Carbone MD  Plan of care signed (Y/N):    Visit# / total visits:     11 /    G-Code (if applicable):      Date / Visit # G-Code Applied:         Progress Note: []  Yes  [x]  No  Next due by: 21     Pain level:   3 /10    Subjective:   Pt reports: having low but slightly higher pain today than on last visit.      Objective:   Observation:    Test measurements:  Quick DASH: 25%  L shoulder: MMT  MMT    Flexion: 4/5  0-127 deg  Abduction: 4/5  0-116 deg  ER:  4-/5  0-37 deg @ 0 deg abd, 0-15 deg @ 60 deg abd  IR:  5/5  0-51 deg @ 60 deg abd    Palpation:    Exercises:  Exercise Resistance/Repetitions Other comments   Pendulums x30 cw/ccw 2   Scapular retraction 3x10 2   Table dusting flexion 3x10 2   Wand ER / IR 2x30 2   Wand abd 3x10 2   UT stretch  5x10\" 2   Pulleys 2' x 3 2        Supine FF  AA/PROM 3 x 10 2   Gentle isometrics: ext, IR, ER 3\" x 15 2               Other Therapeutic Activities:      Manual Treatments:  L shoulder grade II-III mobs all planes, PROM x15'    Modalities:  MH x 10 min prior to manual.  Ice pack L shoulder x 10' to finish      Timed Code Treatment Minutes:    48'      Total Treatment Minutes:    67'    Treatment/Activity Tolerance:  [x] Patient tolerated treatment well [] Patient limited by fatigue  [] Patient limited by pain  [] Patient limited by other medical complications  [x] Other:      Pain after treatment:     2 /10    Prognosis: [x] Good [] Fair  [] Poor    Patient Requires Follow-up: [x] Yes  [] No    Plan:   [x] Continue per plan of care [] Alter current plan (see comments)  [] Plan of care initiated [] Hold pending MD visit [] Discharge    Plan for Next Session:      Goals  Short term goals  Time Frame for Short term goals: 4 weeks  Short term goal 1: Pt to be I with HEP -MET  Short term goal 2: Pt to demonstrate L shoulder flexion AROM of 0-110 deg with minimal scapular substitution. -MET  Short term goal 3: Pt to perform daily activities with pain of less than 5/10. -MET  Short term goal 4: Pt to demonstrate L shoulder ER PROM of 0-25 deg at 0 deg abd. -MET  Long term goals  Time Frame for Long term goals : 8 weeks  Long term goal 1: Quick DASH score to improve to 20% or less indicating improved function. Long term goal 2: Pt to demonstrate 4+/5 or greater L shoulder strength grossly with no pain upon assessment. Long term goal 3: Pt to demonstrate L shoulder flexion and abd AROM of 0-150 deg or greater to improve daily function.   Long term goal 4: Pt to perform daily activities with pain of 2/10 or less at greatest.  Long term goal 5: Pt to demonstrate ER and IR AAROM of 0-60 deg or greater at 90 deg abd.       Electronically signed by:  Rosalina Neil PT

## 2021-09-07 ENCOUNTER — HOSPITAL ENCOUNTER (OUTPATIENT)
Dept: PHYSICAL THERAPY | Facility: HOSPITAL | Age: 46
Setting detail: THERAPIES SERIES
Discharge: HOME | End: 2021-09-07
Payer: COMMERCIAL

## 2021-09-07 PROCEDURE — 97110 THERAPEUTIC EXERCISES: CPT

## 2021-09-07 PROCEDURE — 97140 MANUAL THERAPY 1/> REGIONS: CPT

## 2021-09-07 NOTE — FLOWSHEET NOTE
Physical Therapy Daily Treatment Note   Date:  2021    TIme In:    932             Time Out:  26    Patient Name:  Trinidad Bowie    :  1975  MRN: [id number]    Restrictions/Precautions:    Pertinent Medical History:  Medical/Treatment Diagnosis Information:  ·   L shoulder adhesive capsulitis     Insurance/Certification information:    [employer]   Physician Information:    Savi Ortiz MD  Plan of care signed (Y/N):    Visit# / total visits:     12 /    G-Code (if applicable):      Date / Visit # G-Code Applied:         Progress Note: []  Yes  [x]  No  Next due by: 21     Pain level:   2 /10    Subjective:   Pt reports: generally mild pain today; no new c/o; good compliance with HEP     Objective:   Observation:    Test measurements:  Quick DASH: 25%  L shoulder: MMT  MMT    Flexion: 4/5  0-127 deg  Abduction: 4/5  0-116 deg  ER:  4-/5  0-37 deg @ 0 deg abd, 0-15 deg @ 60 deg abd  IR:  5/5  0-51 deg @ 60 deg abd    Palpation:    Exercises:  Exercise Resistance/Repetitions Other comments   Pendulums x30 cw/ccw 7   Scapular retraction 3x10 7   Table dusting flexion 3x10 7   Wand ER / IR 2x30 7   Wand abd 3x10 7   UT stretch  5x10\" 7   Pulleys 2' x 3 7        Supine FF  AA/PROM 3 x 10 7   Gentle isometrics: ext, IR, ER 3\" x 15 7               Other Therapeutic Activities:      Manual Treatments:  L shoulder grade II-III mobs all planes, PROM x15'    Modalities:  MH x 10 min prior to manual.  Ice pack L shoulder x 10' to finish - declined today      Timed Code Treatment Minutes:    48'      Total Treatment Minutes:    48'    Treatment/Activity Tolerance:  [x] Patient tolerated treatment well [] Patient limited by fatigue  [] Patient limited by pain  [] Patient limited by other medical complications  [x] Other:      Pain after treatment:     1 /10    Prognosis: [x] Good [] Fair  [] Poor    Patient Requires Follow-up: [x] Yes  [] No    Plan:   [x] Continue per plan of care [] Alter current plan (see comments)  [] Plan of care initiated [] Hold pending MD visit [] Discharge    Plan for Next Session:      Goals  Short term goals  Time Frame for Short term goals: 4 weeks  Short term goal 1: Pt to be I with HEP -MET  Short term goal 2: Pt to demonstrate L shoulder flexion AROM of 0-110 deg with minimal scapular substitution. -MET  Short term goal 3: Pt to perform daily activities with pain of less than 5/10. -MET  Short term goal 4: Pt to demonstrate L shoulder ER PROM of 0-25 deg at 0 deg abd. -MET  Long term goals  Time Frame for Long term goals : 8 weeks  Long term goal 1: Quick DASH score to improve to 20% or less indicating improved function. Long term goal 2: Pt to demonstrate 4+/5 or greater L shoulder strength grossly with no pain upon assessment. Long term goal 3: Pt to demonstrate L shoulder flexion and abd AROM of 0-150 deg or greater to improve daily function.   Long term goal 4: Pt to perform daily activities with pain of 2/10 or less at greatest.  Long term goal 5: Pt to demonstrate ER and IR AAROM of 0-60 deg or greater at 90 deg abd.       Electronically signed by:  Susan Vera, PT

## 2021-09-09 ENCOUNTER — HOSPITAL ENCOUNTER (OUTPATIENT)
Dept: PHYSICAL THERAPY | Facility: HOSPITAL | Age: 46
Setting detail: THERAPIES SERIES
Discharge: HOME | End: 2021-09-09
Payer: COMMERCIAL

## 2021-09-09 PROCEDURE — 97140 MANUAL THERAPY 1/> REGIONS: CPT

## 2021-09-09 PROCEDURE — 97110 THERAPEUTIC EXERCISES: CPT

## 2021-09-09 NOTE — FLOWSHEET NOTE
Plan of care initiated [] Hold pending MD visit [] Discharge    Plan for Next Session:      Goals  Short term goals  Time Frame for Short term goals: 4 weeks  Short term goal 1: Pt to be I with HEP -MET  Short term goal 2: Pt to demonstrate L shoulder flexion AROM of 0-110 deg with minimal scapular substitution. -MET  Short term goal 3: Pt to perform daily activities with pain of less than 5/10. -MET  Short term goal 4: Pt to demonstrate L shoulder ER PROM of 0-25 deg at 0 deg abd. -MET  Long term goals  Time Frame for Long term goals : 8 weeks  Long term goal 1: Quick DASH score to improve to 20% or less indicating improved function. Long term goal 2: Pt to demonstrate 4+/5 or greater L shoulder strength grossly with no pain upon assessment. Long term goal 3: Pt to demonstrate L shoulder flexion and abd AROM of 0-150 deg or greater to improve daily function.   Long term goal 4: Pt to perform daily activities with pain of 2/10 or less at greatest.  Long term goal 5: Pt to demonstrate ER and IR AAROM of 0-60 deg or greater at 90 deg abd.       Electronically signed by:  Concetta Lea, PT

## 2021-09-14 ENCOUNTER — HOSPITAL ENCOUNTER (OUTPATIENT)
Dept: PHYSICAL THERAPY | Facility: HOSPITAL | Age: 46
Setting detail: THERAPIES SERIES
Discharge: HOME | End: 2021-09-14
Payer: COMMERCIAL

## 2021-09-14 PROCEDURE — 97140 MANUAL THERAPY 1/> REGIONS: CPT

## 2021-09-14 PROCEDURE — 97110 THERAPEUTIC EXERCISES: CPT

## 2021-09-14 NOTE — FLOWSHEET NOTE
Physical Therapy Daily Treatment / Reassessment Note   Date:  2021    TIme In:    932             Time Out:  7461    Patient Name:  René Dewitt    :  1975  MRN: [id number]    Restrictions/Precautions:    Pertinent Medical History:  Medical/Treatment Diagnosis Information:  ·   L shoulder adhesive capsulitis     Insurance/Certification information:    [employer]   Physician Information:    Sherren Friendly, MD  Plan of care signed (Y/N):    Visit# / total visits:     15 /    G-Code (if applicable):      Date / Visit # G-Code Applied:         Progress Note: [x]  Yes  []  No  Next due by: 21     Pain level:   2 10    Subjective:   Pt reports: generally mild pain today; no new c/o; good compliance with HEP     Objective:   Observation:    Test measurements:  Quick DASH: 20 (was 25% at last assessment)  L shoulder: MMT  AROM    Flexion: 4/5  0-140  Abduction: 4/5  0-120 deg  ER:  4-/5  0-35  IR:  5/5  0-55    Palpation:    Exercises:  Exercise Resistance/Repetitions Other comments   Pendulums x30 cw/ccw 14   Scapular retraction 3x10 14   Table dusting flexion 3x10 14   Wand ER / IR 2x30 14   Wand abd 3x10 14   UT stretch  5x10\" 14   Pulleys 2' x 3 14        Supine FF  AA/PROM 3 x 10 14   Gentle isometrics: ext, IR, ER 3\" x 15 14               Other Therapeutic Activities:      Manual Treatments:  L shoulder grade II-III mobs all planes, PROM x15'    Modalities:  MH x 10 min prior to manual.  Ice pack L shoulder x 10' to finish  - no ice today per pt      Timed Code Treatment Minutes:    40'      Total Treatment Minutes:    62'    Treatment/Activity Tolerance:  [x] Patient tolerated treatment well [] Patient limited by fatigue  [] Patient limited by pain  [] Patient limited by other medical complications  [x] Other:      Pain after treatment:     0 /10    Prognosis: [x] Good [] Fair  [] Poor    Patient Requires Follow-up: [x] Yes  [] No    Plan:   [x] Continue per plan of care [] Alter current plan (see comments)  [] Plan of care initiated [] Hold pending MD visit [] Discharge    Plan for Next Session:      Goals  Short term goals  Time Frame for Short term goals: 4 weeks  Short term goal 1: Pt to be I with HEP -MET  Short term goal 2: Pt to demonstrate L shoulder flexion AROM of 0-110 deg with minimal scapular substitution. -MET  Short term goal 3: Pt to perform daily activities with pain of less than 5/10. -MET  Short term goal 4: Pt to demonstrate L shoulder ER PROM of 0-25 deg at 0 deg abd. -MET  Long term goals  Time Frame for Long term goals : 8 weeks  Long term goal 1: Quick DASH score to improve to 20% or less indicating improved function. - met  Long term goal 2: Pt to demonstrate 4+/5 or greater L shoulder strength grossly with no pain upon assessment. - not met  Long term goal 3: Pt to demonstrate L shoulder flexion and abd AROM of 0-150 deg or greater to improve daily function.  - not met but improving  Long term goal 4: Pt to perform daily activities with pain of 2/10 or less at greatest.  Long term goal 5: Pt to demonstrate ER and IR AAROM of 0-60 deg or greater at 90 deg abd. - not met bu improving    Patient continues to show improvement with decreased pain levels, increasing AROM; functionally is improving; with noted deficits, she will require continued PT to help effectively resolve these, and help achieve attain maximum improvement.       Electronically signed by:  Leonor Goss, PT

## 2021-09-16 ENCOUNTER — APPOINTMENT (OUTPATIENT)
Dept: PHYSICAL THERAPY | Facility: HOSPITAL | Age: 46
End: 2021-09-16
Payer: COMMERCIAL

## 2021-09-21 ENCOUNTER — HOSPITAL ENCOUNTER (OUTPATIENT)
Dept: PHYSICAL THERAPY | Facility: HOSPITAL | Age: 46
Setting detail: THERAPIES SERIES
Discharge: HOME | End: 2021-09-21
Payer: COMMERCIAL

## 2021-09-23 ENCOUNTER — HOSPITAL ENCOUNTER (OUTPATIENT)
Dept: PHYSICAL THERAPY | Facility: HOSPITAL | Age: 46
Setting detail: THERAPIES SERIES
Discharge: HOME | End: 2021-09-23
Payer: COMMERCIAL

## 2021-09-23 PROCEDURE — 97140 MANUAL THERAPY 1/> REGIONS: CPT

## 2021-09-23 PROCEDURE — 97110 THERAPEUTIC EXERCISES: CPT

## 2021-09-23 NOTE — FLOWSHEET NOTE
Physical Therapy Daily Treatment   Date:  2021    TIme In:   924              Time Out:  80    Patient Name:  Trinidad Bowie    :  1975  MRN: [id number]    Restrictions/Precautions:    Pertinent Medical History:  Medical/Treatment Diagnosis Information:  ·   L shoulder adhesive capsulitis     Insurance/Certification information:    [employer]   Physician Information:    Savi Ortiz MD  Plan of care signed (Y/N):    Visit# / total visits:     15 /    G-Code (if applicable):      Date / Visit # G-Code Applied:         Progress Note: []  Yes  [x]  No  Next due by: 10/14/21     Pain level:   2/10    Subjective:   Pt reports: she has soreness / tightness mostly at the back of her shoulder. She continues to see improvement overall. Objective:   Observation:    Test measurements:  Quick DASH: 20 (was 25% at last assessment)  L shoulder: MMT  AROM    Flexion: 4/5  0-140  Abduction: 4/5  0-120 deg  ER:  4-/5  0-35  IR:  5/5  0-55    Palpation:    Exercises:  Exercise Resistance/Repetitions Other comments   Pendulums x30 cw/ccw 23   Scapular retraction 3x10 23   Table dusting flexion 3x10 23   Wand ER / IR 2x30 23   Wand abd 3x10 23   UT stretch  5x10\" 23   Pulleys 2' x 3 23        Supine FF  AA/PROM 3 x 10 23   Gentle isometrics: ext, IR, ER 3\" x 15 23               Other Therapeutic Activities:      Manual Treatments:  L shoulder grade II-III mobs all planes, PROM x15'    Modalities:  MH x 10 min prior to manual.  Ice pack L shoulder x 10' to finish       Timed Code Treatment Minutes:    37'      Total Treatment Minutes:    54'    Treatment/Activity Tolerance:  [x] Patient tolerated treatment well [] Patient limited by fatigue  [] Patient limited by pain  [] Patient limited by other medical complications  [x] Other: Pt is tolerating end range flexion better and demonstrates improved ER / IR mobility.       Pain after treatment:     1/10    Prognosis: [x] Good [] Fair  [] Poor    Patient Requires Follow-up: [x] Yes  [] No    Plan:   [x] Continue per plan of care [] Alter current plan (see comments)  [] Plan of care initiated [] Hold pending MD visit [] Discharge    Plan for Next Session:      Goals  Short term goals  Time Frame for Short term goals: 4 weeks  Short term goal 1: Pt to be I with HEP -MET  Short term goal 2: Pt to demonstrate L shoulder flexion AROM of 0-110 deg with minimal scapular substitution. -MET  Short term goal 3: Pt to perform daily activities with pain of less than 5/10. -MET  Short term goal 4: Pt to demonstrate L shoulder ER PROM of 0-25 deg at 0 deg abd. -MET  Long term goals  Time Frame for Long term goals : 8 weeks  Long term goal 1: Quick DASH score to improve to 20% or less indicating improved function. - met  Long term goal 2: Pt to demonstrate 4+/5 or greater L shoulder strength grossly with no pain upon assessment. - not met  Long term goal 3: Pt to demonstrate L shoulder flexion and abd AROM of 0-150 deg or greater to improve daily function.  - not met but improving  Long term goal 4: Pt to perform daily activities with pain of 2/10 or less at greatest.  Long term goal 5: Pt to demonstrate ER and IR AAROM of 0-60 deg or greater at 90 deg abd. - not met bu improving    Patient continues to show improvement with decreased pain levels, increasing AROM; functionally is improving; with noted deficits, she will require continued PT to help effectively resolve these, and help achieve attain maximum improvement.       Electronically signed by:  Dariana Gonzalez, PT

## 2021-09-28 ENCOUNTER — HOSPITAL ENCOUNTER (OUTPATIENT)
Dept: PHYSICAL THERAPY | Facility: HOSPITAL | Age: 46
Setting detail: THERAPIES SERIES
Discharge: HOME | End: 2021-09-28
Payer: COMMERCIAL

## 2021-09-28 PROCEDURE — 97110 THERAPEUTIC EXERCISES: CPT

## 2021-09-28 PROCEDURE — 97140 MANUAL THERAPY 1/> REGIONS: CPT

## 2021-09-28 NOTE — FLOWSHEET NOTE
Physical Therapy Daily Treatment   Date:  2021    TIme In:   2914              Time Out:  9948    Patient Name:  Jomar Nicole    :  1975  MRN: [id number]    Restrictions/Precautions:    Pertinent Medical History:  Medical/Treatment Diagnosis Information:  ·   L shoulder adhesive capsulitis     Insurance/Certification information:    [facility]   Physician Information:    Mulu Kruse MD  Plan of care signed (Y/N):    Visit# / total visits:     12 /    G-Code (if applicable):      Date / Visit # G-Code Applied:         Progress Note: []  Yes  [x]  No  Next due by: 10/14/21     Pain level:   2/10    Subjective:   Pt reports: she has soreness / tightness mostly at the back of her shoulder. She continues to see improvement overall. Objective:   Observation:    Test measurements:  Quick DASH: 20 (was 25% at last assessment)  L shoulder: MMT  AROM    Flexion: 4/5  0-140  Abduction: 4/5  0-120 deg  ER:  4-/5  0-35  IR:  5/5  0-55    Palpation:    Exercises:  Exercise Resistance/Repetitions Other comments   Pendulums x30 cw/ccw 28   Scapular retraction 3x10 28   Table dusting flexion 3x10 28   Wand ER / IR 2x30 28   Wand abd 3x10 28   UT stretch  5x10\" 28   Pulleys 2' x 3 28        Supine FF  AA/PROM 3 x 10 28   Gentle isometrics: ext, IR, ER 3\" x 15 28               Other Therapeutic Activities:      Manual Treatments:  L shoulder grade II-III mobs all planes, PROM x15'    Modalities:  MH x 10 min prior to manual.  Ice pack L shoulder x 10' to finish - declined      Timed Code Treatment Minutes:    43'      Total Treatment Minutes:    47'    Treatment/Activity Tolerance:  [x] Patient tolerated treatment well [] Patient limited by fatigue  [] Patient limited by pain  [] Patient limited by other medical complications  [x] Other: Pt is tolerating end range flexion better and demonstrates improved ER / IR mobility.       Pain after treatment:     0 /10    Prognosis: [x] Good [] Fair  [] Poor    Patient Requires Follow-up: [x] Yes  [] No    Plan:   [x] Continue per plan of care [] Alter current plan (see comments)  [] Plan of care initiated [] Hold pending MD visit [] Discharge    Plan for Next Session:      Goals  Short term goals  Time Frame for Short term goals: 4 weeks  Short term goal 1: Pt to be I with HEP -MET  Short term goal 2: Pt to demonstrate L shoulder flexion AROM of 0-110 deg with minimal scapular substitution. -MET  Short term goal 3: Pt to perform daily activities with pain of less than 5/10. -MET  Short term goal 4: Pt to demonstrate L shoulder ER PROM of 0-25 deg at 0 deg abd. -MET  Long term goals  Time Frame for Long term goals : 8 weeks  Long term goal 1: Quick DASH score to improve to 20% or less indicating improved function. - met  Long term goal 2: Pt to demonstrate 4+/5 or greater L shoulder strength grossly with no pain upon assessment. - not met  Long term goal 3: Pt to demonstrate L shoulder flexion and abd AROM of 0-150 deg or greater to improve daily function.  - not met but improving  Long term goal 4: Pt to perform daily activities with pain of 2/10 or less at greatest.  Long term goal 5: Pt to demonstrate ER and IR AAROM of 0-60 deg or greater at 90 deg abd. - not met bu improving    Patient continues to show improvement with decreased pain levels, increasing AROM; functionally is improving; with noted deficits, she will require continued PT to help effectively resolve these, and help achieve attain maximum improvement.       Electronically signed by:  Matteo Foote, PT

## 2021-09-30 ENCOUNTER — HOSPITAL ENCOUNTER (OUTPATIENT)
Dept: PHYSICAL THERAPY | Facility: HOSPITAL | Age: 46
Setting detail: THERAPIES SERIES
Discharge: HOME | End: 2021-09-30
Payer: COMMERCIAL

## 2021-09-30 PROCEDURE — 97110 THERAPEUTIC EXERCISES: CPT

## 2021-09-30 PROCEDURE — 97140 MANUAL THERAPY 1/> REGIONS: CPT

## 2021-09-30 NOTE — FLOWSHEET NOTE
Physical Therapy Daily Treatment   Date:  2021    TIme In:   0933              Time Out:  9464    Patient Name:  Annabel Jeffers    :  1975  MRN: [id number]    Restrictions/Precautions:    Pertinent Medical History:  Medical/Treatment Diagnosis Information:  ·   L shoulder adhesive capsulitis     Insurance/Certification information:    [employer]   Physician Information:    Iván Garcias MD  Plan of care signed (Y/N):    Visit# / total visits:     16 /    G-Code (if applicable):      Date / Visit # G-Code Applied:         Progress Note: []  Yes  [x]  No  Next due by: 10/14/21     Pain level:   2/10    Subjective:   Pt reports: she has soreness / tightness mostly at the back of her shoulder. She continues to see improvement overall. Objective:   Observation:    Test measurements:  Quick DASH: 20 (was 25% at last assessment)  L shoulder: MMT  AROM    Flexion: 4/5  0-140  Abduction: 4/5  0-120 deg  ER:  4-/5  0-35  IR:  5/5  0-55    Palpation:    Exercises:  Exercise Resistance/Repetitions Other comments   Pendulums x30 cw/ccw 30   Scapular retraction 3x10 30   Table dusting flexion 3x10 30   Wand ER / IR 2x30 30   Wand abd 3x10 30   UT stretch  5x10\" 30   Pulleys 2' x 3 30        Supine FF  AA/PROM 3 x 10 30   Gentle isometrics: ext, IR, ER 3\" x 15 30               Other Therapeutic Activities:      Manual Treatments:  L shoulder grade II-III mobs all planes, PROM x15'    Modalities:  MH x 10 min prior to manual.  Ice pack L shoulder x 10'      Timed Code Treatment Minutes:   36 '      Total Treatment Minutes:   58 '    Treatment/Activity Tolerance:  [x] Patient tolerated treatment well [] Patient limited by fatigue  [] Patient limited by pain  [] Patient limited by other medical complications  [x] Other: Pt is tolerating end range flexion better and demonstrates improved ER / IR mobility.       Pain after treatment:     1 /10    Prognosis: [x] Good [] Fair  [] Poor    Patient Requires Follow-up: [x] Yes  [] No    Plan:   [x] Continue per plan of care [] Alter current plan (see comments)  [] Plan of care initiated [] Hold pending MD visit [] Discharge    Plan for Next Session:      Goals  Short term goals  Time Frame for Short term goals: 4 weeks  Short term goal 1: Pt to be I with HEP -MET  Short term goal 2: Pt to demonstrate L shoulder flexion AROM of 0-110 deg with minimal scapular substitution. -MET  Short term goal 3: Pt to perform daily activities with pain of less than 5/10. -MET  Short term goal 4: Pt to demonstrate L shoulder ER PROM of 0-25 deg at 0 deg abd. -MET  Long term goals  Time Frame for Long term goals : 8 weeks  Long term goal 1: Quick DASH score to improve to 20% or less indicating improved function. - met  Long term goal 2: Pt to demonstrate 4+/5 or greater L shoulder strength grossly with no pain upon assessment. - not met  Long term goal 3: Pt to demonstrate L shoulder flexion and abd AROM of 0-150 deg or greater to improve daily function.  - not met but improving  Long term goal 4: Pt to perform daily activities with pain of 2/10 or less at greatest.  Long term goal 5: Pt to demonstrate ER and IR AAROM of 0-60 deg or greater at 90 deg abd. - not met bu improving    Patient continues to show improvement with decreased pain levels, increasing AROM; functionally is improving; with noted deficits, she will require continued PT to help effectively resolve these, and help achieve attain maximum improvement.       Electronically signed by:  Darrall Goltz, PT

## 2021-10-05 ENCOUNTER — APPOINTMENT (OUTPATIENT)
Dept: PHYSICAL THERAPY | Facility: HOSPITAL | Age: 46
End: 2021-10-05
Payer: COMMERCIAL

## 2021-10-07 ENCOUNTER — HOSPITAL ENCOUNTER (OUTPATIENT)
Dept: PHYSICAL THERAPY | Facility: HOSPITAL | Age: 46
Setting detail: THERAPIES SERIES
Discharge: HOME | End: 2021-10-07
Payer: COMMERCIAL

## 2021-10-07 PROCEDURE — 97140 MANUAL THERAPY 1/> REGIONS: CPT

## 2021-10-07 PROCEDURE — 97110 THERAPEUTIC EXERCISES: CPT

## 2021-10-07 NOTE — FLOWSHEET NOTE
Physical Therapy Daily Treatment   Date:  10/7/2021    TIme In:   0932              Time Out:  1030    Patient Name:  Corie Felton    :  1975  MRN: [id number]    Restrictions/Precautions:    Pertinent Medical History:  Medical/Treatment Diagnosis Information:  ·   L shoulder adhesive capsulitis     Insurance/Certification information:    [facility]   Physician Information:    Jennifer Park MD  Plan of care signed (Y/N):    Visit# / total visits:     25 /    G-Code (if applicable):      Date / Visit # G-Code Applied:         Progress Note: []  Yes  [x]  No  Next due by: 10/14/21     Pain level:   2/10    Subjective:   Pt reports: she is most limited with reaching back (ER plane). Objective:   Observation:    Test measurements:  Quick DASH: 20 (was 25% at last assessment)  L shoulder: MMT  AROM    Flexion: 4/5  0-140  Abduction: 4/5  0-120 deg  ER:  4-/5  0-35  IR:  5/5  0-55    Palpation:    Exercises:  Exercise Resistance/Repetitions Other comments   Pendulums x30 cw/ccw 7   Scapular retraction 3x10 7   Table dusting flexion 3x10 7   Wand ER / IR 2x30 7   Wand abd 3x10 7   UT stretch  5x10\" 7   Pulleys 2' x 3 7        Supine FF  AA/PROM 3 x 10 7   Gentle isometrics: ext, IR, ER 3\" x 15 7               Other Therapeutic Activities:      Manual Treatments:  L shoulder grade II-III mobs all planes, PROM x15'    Modalities:  MH x 10 min prior to manual.  Ice pack L shoulder x 10' - ice not performed today      Timed Code Treatment Minutes:   36 '      Total Treatment Minutes:   62 '    Treatment/Activity Tolerance:  [x] Patient tolerated treatment well [] Patient limited by fatigue  [] Patient limited by pain  [] Patient limited by other medical complications  [x] Other: Pt is primarily limited with ER / IR PROM; progressing well in all planes.       Pain after treatment:     1 /10    Prognosis: [x] Good [] Fair  [] Poor    Patient Requires Follow-up: [x] Yes  [] No    Plan:   [x] Continue per plan of

## 2021-10-12 ENCOUNTER — HOSPITAL ENCOUNTER (OUTPATIENT)
Dept: PHYSICAL THERAPY | Facility: HOSPITAL | Age: 46
Setting detail: THERAPIES SERIES
Discharge: HOME | End: 2021-10-12
Payer: COMMERCIAL

## 2021-10-12 PROCEDURE — 97110 THERAPEUTIC EXERCISES: CPT

## 2021-10-12 PROCEDURE — 97140 MANUAL THERAPY 1/> REGIONS: CPT

## 2021-10-12 NOTE — FLOWSHEET NOTE
Physical Therapy Daily Treatment / Re-Assessment   Date:  10/12/2021    TIme In:   929              Time Out:  56    Patient Name:  Quinn Hall    :  1975  MRN: [id number]    Restrictions/Precautions:    Pertinent Medical History:  Medical/Treatment Diagnosis Information:  ·   L shoulder adhesive capsulitis     Insurance/Certification information:    [facility]   Physician Information:    Pam Jane MD  Plan of care signed (Y/N):    Visit# / total visits:     23 /    G-Code (if applicable):      Date / Visit # G-Code Applied:         Progress Note: [x]  Yes  []  No  Next due by: 21     Pain level:   2/10    Subjective:   Pt reports: she is pleased with her progress. She still has most difficulty with reaching back and rotation movements. Objective:   Observation:    Test measurements:  Quick DASH: 18% (was 20% at last assessment)  L shoulder: MMT  AROM  PROM    Flexion: 4+/5  0-150  0-162 deg  Abduction: 4/5  0-157  0-170 deg  ER:  4/5    0-42 deg  IR:  5/5    0-33 deg   Palpation:    Exercises:  Exercise Resistance/Repetitions Other comments   Pendulums x30 cw/ccw 12   Scapular retraction 3x10 12   Table dusting flexion 3x10 12   Wand ER / IR 2x30 12   Wand abd 3x10 12   UT stretch  5x10\" 12   Pulleys 2' x 3 12        Supine FF  AA/PROM 3 x 10 12   Gentle isometrics: ext, IR, ER 3\" x 15 12               Other Therapeutic Activities:      Manual Treatments:  L shoulder grade II-III mobs all planes, PROM x15'    Modalities:  MH x 10 min prior to manual.  Ice pack L shoulder x 10' - ice not performed today      Timed Code Treatment Minutes:   48'      Total Treatment Minutes:   64'    Treatment/Activity Tolerance:  [x] Patient tolerated treatment well [] Patient limited by fatigue  [] Patient limited by pain  [] Patient limited by other medical complications  [x] Other: Pt has responded very well to skilled PT with gradual progression of ROM.   Flexion and abduction ROM have significantly increased since last visit. Pt continues to be most limited with ER/IR and has pain with abd and ER MMT. She will benefit from 2-4 more weeks of skilled PT to further address ROM and strength to restore optimal functional level. Pain after treatment:     1 /10    Prognosis: [x] Good [] Fair  [] Poor    Patient Requires Follow-up: [x] Yes  [] No    Plan:   [x] Continue per plan of care [] Alter current plan (see comments)  [] Plan of care initiated [] Hold pending MD visit [] Discharge    Plan for Next Session:      Goals  Short term goals  Time Frame for Short term goals: 4 weeks  Short term goal 1: Pt to be I with HEP -MET  Short term goal 2: Pt to demonstrate L shoulder flexion AROM of 0-110 deg with minimal scapular substitution. -MET  Short term goal 3: Pt to perform daily activities with pain of less than 5/10. -MET  Short term goal 4: Pt to demonstrate L shoulder ER PROM of 0-25 deg at 0 deg abd. -MET  Long term goals  Time Frame for Long term goals : 8 weeks  Long term goal 1: Quick DASH score to improve to 20% or less indicating improved function. - met  Long term goal 2: Pt to demonstrate 4+/5 or greater L shoulder strength grossly with no pain upon assessment. - not met  Long term goal 3: Pt to demonstrate L shoulder flexion and abd AROM of 0-150 deg or greater to improve daily function.  - not met but improving  Long term goal 4: Pt to perform daily activities with pain of 2/10 or less at greatest.  Long term goal 5: Pt to demonstrate ER and IR AAROM of 0-60 deg or greater at 90 deg abd. - not met bu improving    Patient continues to show improvement with decreased pain levels, increasing AROM; functionally is improving; with noted deficits, she will require continued PT to help effectively resolve these, and help achieve attain maximum improvement.       Electronically signed by:  Castro Kaufman, PT

## 2021-10-14 ENCOUNTER — APPOINTMENT (OUTPATIENT)
Dept: PHYSICAL THERAPY | Facility: HOSPITAL | Age: 46
End: 2021-10-14
Payer: COMMERCIAL

## 2021-10-19 ENCOUNTER — APPOINTMENT (OUTPATIENT)
Dept: PHYSICAL THERAPY | Facility: HOSPITAL | Age: 46
End: 2021-10-19
Payer: COMMERCIAL

## 2021-10-21 ENCOUNTER — APPOINTMENT (OUTPATIENT)
Dept: PHYSICAL THERAPY | Facility: HOSPITAL | Age: 46
End: 2021-10-21
Payer: COMMERCIAL

## 2021-10-26 ENCOUNTER — HOSPITAL ENCOUNTER (OUTPATIENT)
Dept: PHYSICAL THERAPY | Facility: HOSPITAL | Age: 46
Setting detail: THERAPIES SERIES
Discharge: HOME | End: 2021-10-26
Payer: COMMERCIAL

## 2021-10-26 PROCEDURE — 97140 MANUAL THERAPY 1/> REGIONS: CPT

## 2021-10-26 PROCEDURE — 97110 THERAPEUTIC EXERCISES: CPT

## 2021-10-26 NOTE — FLOWSHEET NOTE
Physical Therapy Daily Treatment  Date:  10/26/2021    TIme In:   7061              Time Out:  2522    Patient Name:  Drew Madden    :  1975  MRN: [id number]    Restrictions/Precautions:    Pertinent Medical History:  Medical/Treatment Diagnosis Information:  ·   L shoulder adhesive capsulitis     Insurance/Certification information:    [facility]   Physician Information:    Shena Strauss MD  Plan of care signed (Y/N):    Visit# / total visits:     21 /    G-Code (if applicable):      Date / Visit # G-Code Applied:         Progress Note: []  Yes  [x]  No  Next due by: 21     Pain level:   1 /10    Subjective:   Pt reports: she is pleased with her progress. She still has most difficulty with reaching back and rotation movements. Objective:   Observation:    Test measurements:  Quick DASH: 18% (was 20% at last assessment)  L shoulder: MMT  AROM  PROM    Flexion: 4+/5  0-150  0-162 deg  Abduction: 4/5  0-157  0-170 deg  ER:  4/5    0-42 deg  IR:  5/5    0-33 deg   Palpation:    Exercises:  Exercise Resistance/Repetitions Other comments   Pendulums x30 cw/ccw 26   Scapular retraction 3x10 26   Table dusting flexion 3x10 26   Wand ER / IR 2x30 26   Wand abd 3x10 26   UT stretch  5x10\" 26   Pulleys 2' x 3 26        Supine FF  AA/PROM 3 x 10 26   Gentle isometrics: ext, IR, ER 3\" x 15 26               Other Therapeutic Activities:      Manual Treatments:  L shoulder grade II-III mobs all planes, PROM x15'    Modalities:  MH x 10 min prior to manual.  --  Ice pack L shoulder x 10' - ice not performed today      Timed Code Treatment Minutes:  36 '      Total Treatment Minutes:  46 '    Treatment/Activity Tolerance:  [x] Patient tolerated treatment well [] Patient limited by fatigue  [] Patient limited by pain  [] Patient limited by other medical complications  [x] Other: Pt has responded very well to skilled PT with gradual progression of ROM.   Flexion and abduction ROM have significantly increased since last visit. Pt continues to be most limited with ER/IR and has pain with abd and ER MMT. She will benefit from 2-4 more weeks of skilled PT to further address ROM and strength to restore optimal functional level. Pain after treatment:     0 /10    Prognosis: [x] Good [] Fair  [] Poor    Patient Requires Follow-up: [x] Yes  [] No    Plan:   [x] Continue per plan of care [] Alter current plan (see comments)  [] Plan of care initiated [] Hold pending MD visit [] Discharge    Plan for Next Session:      Goals  Short term goals  Time Frame for Short term goals: 4 weeks  Short term goal 1: Pt to be I with HEP -MET  Short term goal 2: Pt to demonstrate L shoulder flexion AROM of 0-110 deg with minimal scapular substitution. -MET  Short term goal 3: Pt to perform daily activities with pain of less than 5/10. -MET  Short term goal 4: Pt to demonstrate L shoulder ER PROM of 0-25 deg at 0 deg abd. -MET  Long term goals  Time Frame for Long term goals : 8 weeks  Long term goal 1: Quick DASH score to improve to 20% or less indicating improved function. - met  Long term goal 2: Pt to demonstrate 4+/5 or greater L shoulder strength grossly with no pain upon assessment. - not met  Long term goal 3: Pt to demonstrate L shoulder flexion and abd AROM of 0-150 deg or greater to improve daily function.  - not met but improving  Long term goal 4: Pt to perform daily activities with pain of 2/10 or less at greatest.  Long term goal 5: Pt to demonstrate ER and IR AAROM of 0-60 deg or greater at 90 deg abd. - not met bu improving    Patient continues to show improvement with decreased pain levels, increasing AROM; functionally is improving; with noted deficits, she will require continued PT to help effectively resolve these, and help achieve attain maximum improvement.       Electronically signed by:  Lyn Riley, PT

## 2021-10-28 ENCOUNTER — APPOINTMENT (OUTPATIENT)
Dept: PHYSICAL THERAPY | Facility: HOSPITAL | Age: 46
End: 2021-10-28
Payer: COMMERCIAL

## 2021-11-02 ENCOUNTER — HOSPITAL ENCOUNTER (OUTPATIENT)
Dept: PHYSICAL THERAPY | Facility: HOSPITAL | Age: 46
Setting detail: THERAPIES SERIES
Discharge: HOME | End: 2021-11-02
Payer: COMMERCIAL

## 2021-11-02 PROCEDURE — 97110 THERAPEUTIC EXERCISES: CPT

## 2021-11-02 PROCEDURE — 97140 MANUAL THERAPY 1/> REGIONS: CPT

## 2021-11-02 NOTE — FLOWSHEET NOTE
Physical Therapy Daily Treatment  Date:  2021    TIme In:                 Time Out:  1031    Patient Name:  Annabel Jeffers    :  1975  MRN: [id number]    Restrictions/Precautions:    Pertinent Medical History:  Medical/Treatment Diagnosis Information:  ·   L shoulder adhesive capsulitis     Insurance/Certification information:    [employer]   Physician Information:    Iván Garcias MD  Plan of care signed (Y/N):    Visit# / total visits:     21 /    G-Code (if applicable):      Date / Visit # G-Code Applied:         Progress Note: []  Yes  [x]  No  Next due by: 21     Pain level:   1/10    Subjective:   Pt reports: she still has limitations with reaching behind her back but overall is improving. Objective:   Observation:    Test measurements:  Quick DASH: 18% (was 20% at last assessment)  L shoulder: MMT  AROM  PROM    Flexion: 4+/5  0-150  0-162 deg  Abduction: 4/5  0-157  0-170 deg  ER:  4/5    0-42 deg  IR:  5/5    0-33 deg   Palpation:    Exercises:  Exercise Resistance/Repetitions Other comments   Pendulums x30 cw/ccw 2   Scapular retraction 3x10 2   Table dusting flexion 3x10 2   Wand ER / IR 2x30 2   Wand abd 3x10 2   UT stretch  5x10\" 2   Pulleys 2' x 3 2        Supine FF  AA/PROM 3 x 10 2   Gentle isometrics: ext, IR, ER 3\" x 15 2               Other Therapeutic Activities:      Manual Treatments:  L shoulder grade II-III mobs all planes, PROM x15'    Modalities:  MH x 10 min prior to manual.  --  Ice pack L shoulder x 10' - ice not performed today      Timed Code Treatment Minutes:  48'      Total Treatment Minutes:  61'    Treatment/Activity Tolerance:  [x] Patient tolerated treatment well [] Patient limited by fatigue  [] Patient limited by pain  [] Patient limited by other medical complications  [x] Other: Focus was placed on anterior capsule to address improving ER motion.        Pain after treatment:     1/10    Prognosis: [x] Good [] Fair  [] Poor    Patient Requires Follow-up: [x] Yes  [] No    Plan:   [x] Continue per plan of care [] Alter current plan (see comments)  [] Plan of care initiated [] Hold pending MD visit [] Discharge    Plan for Next Session:      Goals  Short term goals  Time Frame for Short term goals: 4 weeks  Short term goal 1: Pt to be I with HEP -MET  Short term goal 2: Pt to demonstrate L shoulder flexion AROM of 0-110 deg with minimal scapular substitution. -MET  Short term goal 3: Pt to perform daily activities with pain of less than 5/10. -MET  Short term goal 4: Pt to demonstrate L shoulder ER PROM of 0-25 deg at 0 deg abd. -MET  Long term goals  Time Frame for Long term goals : 8 weeks  Long term goal 1: Quick DASH score to improve to 20% or less indicating improved function. - met  Long term goal 2: Pt to demonstrate 4+/5 or greater L shoulder strength grossly with no pain upon assessment. - not met  Long term goal 3: Pt to demonstrate L shoulder flexion and abd AROM of 0-150 deg or greater to improve daily function.  - not met but improving  Long term goal 4: Pt to perform daily activities with pain of 2/10 or less at greatest.  Long term goal 5: Pt to demonstrate ER and IR AAROM of 0-60 deg or greater at 90 deg abd. - not met bu improving    Patient continues to show improvement with decreased pain levels, increasing AROM; functionally is improving; with noted deficits, she will require continued PT to help effectively resolve these, and help achieve attain maximum improvement.       Electronically signed by:  Analilia Fernández, PT

## 2021-11-04 ENCOUNTER — HOSPITAL ENCOUNTER (OUTPATIENT)
Dept: PHYSICAL THERAPY | Facility: HOSPITAL | Age: 46
Setting detail: THERAPIES SERIES
Discharge: HOME | End: 2021-11-04
Payer: COMMERCIAL

## 2021-11-04 PROCEDURE — 97140 MANUAL THERAPY 1/> REGIONS: CPT

## 2021-11-04 PROCEDURE — 97110 THERAPEUTIC EXERCISES: CPT

## 2021-11-04 NOTE — FLOWSHEET NOTE
Physical Therapy Daily Treatment  Date:  2021    TIme In:   0930              Time Out:  8560    Patient Name:  Jimmy Pascual    :  1975  MRN: [id number]    Restrictions/Precautions:    Pertinent Medical History:  Medical/Treatment Diagnosis Information:  ·   L shoulder adhesive capsulitis     Insurance/Certification information:    [employer]   Physician Information:    Karina Regalado MD  Plan of care signed (Y/N):    Visit# / total visits:     25 /    G-Code (if applicable):      Date / Visit # G-Code Applied:         Progress Note: []  Yes  [x]  No  Next due by: 21     Pain level:   0/10    Subjective:   Pt reports: having no pain currently, seeing good improvement with her shoulder.        Objective:   Observation:    Test measurements:  Quick DASH: 18% (was 20% at last assessment)  L shoulder: MMT  AROM  PROM    Flexion: 4+/5  0-150  0-162 deg  Abduction: 4/5  0-157  0-170 deg  ER:  4/5    0-42 deg  IR:  5/5    0-33 deg   Palpation:    Exercises:  Exercise Resistance/Repetitions Other comments   Pendulums x30 cw/ccw 4   Scapular retraction 3x10 4   Table dusting flexion 3x10 4   Wand ER / IR 2x30 4   Wand abd 3x10 4   UT stretch  5x10\" 4   Pulleys 2' x 3 4        Supine FF  AA/PROM 3 x 10 4   Gentle isometrics: ext, IR, ER 3\" x 15 4               Other Therapeutic Activities:      Manual Treatments:  L shoulder grade II-III mobs all planes, PROM x15'    Modalities:  MH x 10 min prior to manual.  --  Ice pack L shoulder x 10' - ice not performed today      Timed Code Treatment Minutes:  48'      Total Treatment Minutes:  72'    Treatment/Activity Tolerance:  [x] Patient tolerated treatment well [] Patient limited by fatigue  [] Patient limited by pain  [] Patient limited by other medical complications  [] Other:     Pain after treatment:     1-2/10    Prognosis: [x] Good [] Fair  [] Poor    Patient Requires Follow-up: [x] Yes  [] No    Plan:   [x] Continue per plan of care [] Alter current plan (see comments)  [] Plan of care initiated [] Hold pending MD visit [] Discharge    Plan for Next Session:      Goals  Short term goals  Time Frame for Short term goals: 4 weeks  Short term goal 1: Pt to be I with HEP -MET  Short term goal 2: Pt to demonstrate L shoulder flexion AROM of 0-110 deg with minimal scapular substitution. -MET  Short term goal 3: Pt to perform daily activities with pain of less than 5/10. -MET  Short term goal 4: Pt to demonstrate L shoulder ER PROM of 0-25 deg at 0 deg abd. -MET  Long term goals  Time Frame for Long term goals : 8 weeks  Long term goal 1: Quick DASH score to improve to 20% or less indicating improved function. - met  Long term goal 2: Pt to demonstrate 4+/5 or greater L shoulder strength grossly with no pain upon assessment. - not met  Long term goal 3: Pt to demonstrate L shoulder flexion and abd AROM of 0-150 deg or greater to improve daily function.  - not met but improving  Long term goal 4: Pt to perform daily activities with pain of 2/10 or less at greatest.  Long term goal 5: Pt to demonstrate ER and IR AAROM of 0-60 deg or greater at 90 deg abd. - not met bu improving    Patient continues to show improvement with decreased pain levels, increasing AROM; functionally is improving; with noted deficits, she will require continued PT to help effectively resolve these, and help achieve attain maximum improvement.       Electronically signed by:  Tavo Tran, PT

## 2021-11-09 ENCOUNTER — HOSPITAL ENCOUNTER (OUTPATIENT)
Dept: PHYSICAL THERAPY | Facility: HOSPITAL | Age: 46
Setting detail: THERAPIES SERIES
Discharge: HOME | End: 2021-11-09
Payer: COMMERCIAL

## 2021-11-09 PROCEDURE — 97110 THERAPEUTIC EXERCISES: CPT

## 2021-11-09 PROCEDURE — 97140 MANUAL THERAPY 1/> REGIONS: CPT

## 2021-11-09 NOTE — FLOWSHEET NOTE
Physical Therapy Daily Treatment / Re-Assessment  Date:  2021    TIme In:   6081              Time Out:  1034    Patient Name:  Natan Sellers    :  1975  MRN: [id number]    Restrictions/Precautions:    Pertinent Medical History:  Medical/Treatment Diagnosis Information:  ·   L shoulder adhesive capsulitis     Insurance/Certification information:    [facility]   Physician Information:    Lynn Carrera MD  Plan of care signed (Y/N):    Visit# / total visits:     23 /    G-Code (if applicable):      Date / Visit # G-Code Applied:         Progress Note: [x]  Yes  []  No  Next due by: 21     Pain level:   1/10    Subjective:   Pt reports: having mild discomfort. She is seeing good increase in ROM, mostly limited with ER motion. Objective:   Observation:    Test measurements:  Quick DASH: 16% (was 18% at last assessment)  L shoulder: MMT  AROM  PROM    Flexion: 5/5  0-155  0-160 deg  Abduction: 4+/5  0-160  0-178 deg  ER:  4+/5    0-35 deg  IR:  5/5    0-48 deg   Palpation:    Exercises:  Exercise Resistance/Repetitions Other comments   Pendulums x30 cw/ccw 9   Scapular retraction 3x10 9   Table dusting flexion 3x10 9   Wand ER / IR 2x30 9   Wand abd 3x10 9   UT stretch  5x10\" 9   Pulleys 2' x 3 9        Supine FF  AA/PROM 3 x 10 9   Gentle isometrics: ext, IR, ER 3\" x 15 9               Other Therapeutic Activities:      Manual Treatments:  L shoulder grade II-III mobs all planes, PROM x20'    Modalities:  MH x 10 min prior to manual.  --  Ice pack L shoulder x 10' - ice not performed today      Timed Code Treatment Minutes:  46'      Total Treatment Minutes:  61'    Treatment/Activity Tolerance:  [x] Patient tolerated treatment well [] Patient limited by fatigue  [] Patient limited by pain  [] Patient limited by other medical complications  [x] Other: Pt demonstrates improvement in shoulder flexion, abduction, and IR ROM.   ER was decreased as compared to last assessment, but pt was able to tolerate testing in true 90/90 position today. She has progressed well overall with ROM, strength, and function. Plan to decrease frequency to 1x/week with pt continuing HEP. Pt will be out of town next week, plan to start 1x/week the following week. She is making good progress toward goals; she has met all STG and 3/5 LTG. Pain after treatment:     0/10    Prognosis: [x] Good [] Fair  [] Poor    Patient Requires Follow-up: [x] Yes  [] No    Plan:   [x] Continue per plan of care [] Alter current plan (see comments)  [] Plan of care initiated [] Hold pending MD visit [] Discharge    Plan for Next Session:      Goals  Short term goals  Time Frame for Short term goals: 4 weeks  Short term goal 1: Pt to be I with HEP -MET  Short term goal 2: Pt to demonstrate L shoulder flexion AROM of 0-110 deg with minimal scapular substitution. -MET  Short term goal 3: Pt to perform daily activities with pain of less than 5/10. -MET  Short term goal 4: Pt to demonstrate L shoulder ER PROM of 0-25 deg at 0 deg abd. -MET  Long term goals  Time Frame for Long term goals : 8 weeks  Long term goal 1: Quick DASH score to improve to 20% or less indicating improved function. - MET  Long term goal 2: Pt to demonstrate 4+/5 or greater L shoulder strength grossly with no pain upon assessment. - MET  Long term goal 3: Pt to demonstrate L shoulder flexion and abd AROM of 0-150 deg or greater to improve daily function.  -MET  Long term goal 4: Pt to perform daily activities with pain of 2/10 or less at greatest. -mostly met  Long term goal 5: Pt to demonstrate ER and IR AAROM of 0-60 deg or greater at 90 deg abd. - not met but improving         Electronically signed by:  Dirk Lemus, PT

## 2021-11-23 ENCOUNTER — APPOINTMENT (OUTPATIENT)
Dept: PHYSICAL THERAPY | Facility: HOSPITAL | Age: 46
End: 2021-11-23
Payer: COMMERCIAL

## 2025-06-18 ENCOUNTER — HOSPITAL ENCOUNTER (OUTPATIENT)
Dept: CT IMAGING | Facility: HOSPITAL | Age: 50
Discharge: HOME | End: 2025-06-18
Payer: COMMERCIAL

## 2025-06-18 ENCOUNTER — APPOINTMENT (OUTPATIENT)
Dept: OTHER | Facility: HOSPITAL | Age: 50
End: 2025-06-18
Payer: COMMERCIAL

## 2025-06-18 ENCOUNTER — APPOINTMENT (OUTPATIENT)
Dept: MRI IMAGING | Facility: HOSPITAL | Age: 50
End: 2025-06-18
Payer: COMMERCIAL

## 2025-06-18 ENCOUNTER — HOSPITAL ENCOUNTER (INPATIENT)
Facility: HOSPITAL | Age: 50
LOS: 9 days | Discharge: HOME | End: 2025-06-28
Attending: EMERGENCY MEDICINE | Admitting: INTERNAL MEDICINE
Payer: COMMERCIAL

## 2025-06-18 DIAGNOSIS — R51.9 HEADACHE DISORDER: ICD-10-CM

## 2025-06-18 DIAGNOSIS — G93.89 BRAIN MASS: Primary | ICD-10-CM

## 2025-06-18 LAB
ALBUMIN SERPL-MCNC: 4.2 G/DL (ref 3.5–5.2)
ALBUMIN/GLOB SERPL: 1.6 G/DL
ALP SERPL-CCNC: 67 U/L (ref 39–117)
ALT SERPL W P-5'-P-CCNC: 14 U/L (ref 1–33)
ANION GAP SERPL CALCULATED.3IONS-SCNC: 12 MMOL/L (ref 5–15)
AST SERPL-CCNC: 17 U/L (ref 1–32)
BASOPHILS # BLD AUTO: 0.04 10*3/MM3 (ref 0–0.2)
BASOPHILS NFR BLD AUTO: 0.6 % (ref 0–1.5)
BILIRUB SERPL-MCNC: 0.4 MG/DL (ref 0–1.2)
BUN SERPL-MCNC: 10.3 MG/DL (ref 6–20)
BUN/CREAT SERPL: 17.2 (ref 7–25)
CALCIUM SPEC-SCNC: 9.2 MG/DL (ref 8.6–10.5)
CHLORIDE SERPL-SCNC: 102 MMOL/L (ref 98–107)
CO2 SERPL-SCNC: 27 MMOL/L (ref 22–29)
CREAT SERPL-MCNC: 0.6 MG/DL (ref 0.57–1)
DEPRECATED RDW RBC AUTO: 41.6 FL (ref 37–54)
EGFRCR SERPLBLD CKD-EPI 2021: 108.2 ML/MIN/1.73
EOSINOPHIL # BLD AUTO: 0.07 10*3/MM3 (ref 0–0.4)
EOSINOPHIL NFR BLD AUTO: 1.1 % (ref 0.3–6.2)
ERYTHROCYTE [DISTWIDTH] IN BLOOD BY AUTOMATED COUNT: 12.1 % (ref 12.3–15.4)
GLOBULIN UR ELPH-MCNC: 2.6 GM/DL
GLUCOSE SERPL-MCNC: 94 MG/DL (ref 65–99)
HCT VFR BLD AUTO: 42.3 % (ref 34–46.6)
HGB BLD-MCNC: 14.3 G/DL (ref 12–15.9)
IMM GRANULOCYTES # BLD AUTO: 0.02 10*3/MM3 (ref 0–0.05)
IMM GRANULOCYTES NFR BLD AUTO: 0.3 % (ref 0–0.5)
LYMPHOCYTES # BLD AUTO: 1.1 10*3/MM3 (ref 0.7–3.1)
LYMPHOCYTES NFR BLD AUTO: 16.7 % (ref 19.6–45.3)
MCH RBC QN AUTO: 31.6 PG (ref 26.6–33)
MCHC RBC AUTO-ENTMCNC: 33.8 G/DL (ref 31.5–35.7)
MCV RBC AUTO: 93.4 FL (ref 79–97)
MONOCYTES # BLD AUTO: 0.37 10*3/MM3 (ref 0.1–0.9)
MONOCYTES NFR BLD AUTO: 5.6 % (ref 5–12)
NEUTROPHILS NFR BLD AUTO: 5 10*3/MM3 (ref 1.7–7)
NEUTROPHILS NFR BLD AUTO: 75.7 % (ref 42.7–76)
NRBC BLD AUTO-RTO: 0 /100 WBC (ref 0–0.2)
PLATELET # BLD AUTO: 252 10*3/MM3 (ref 140–450)
PMV BLD AUTO: 9.6 FL (ref 6–12)
POTASSIUM SERPL-SCNC: 3.6 MMOL/L (ref 3.5–5.2)
PROT SERPL-MCNC: 6.8 G/DL (ref 6–8.5)
RBC # BLD AUTO: 4.53 10*6/MM3 (ref 3.77–5.28)
SODIUM SERPL-SCNC: 141 MMOL/L (ref 136–145)
WBC NRBC COR # BLD AUTO: 6.6 10*3/MM3 (ref 3.4–10.8)

## 2025-06-18 PROCEDURE — 70450 CT HEAD/BRAIN W/O DYE: CPT

## 2025-06-18 PROCEDURE — 70553 MRI BRAIN STEM W/O & W/DYE: CPT

## 2025-06-18 PROCEDURE — 25010000002 HYDROMORPHONE PER 4 MG: Performed by: EMERGENCY MEDICINE

## 2025-06-18 PROCEDURE — 25010000002 DEXAMETHASONE PER 1 MG: Performed by: PHYSICIAN ASSISTANT

## 2025-06-18 PROCEDURE — 80053 COMPREHEN METABOLIC PANEL: CPT | Performed by: PHYSICIAN ASSISTANT

## 2025-06-18 PROCEDURE — 99285 EMERGENCY DEPT VISIT HI MDM: CPT

## 2025-06-18 PROCEDURE — 25510000002 GADOBENATE DIMEGLUMINE 529 MG/ML SOLUTION: Performed by: EMERGENCY MEDICINE

## 2025-06-18 PROCEDURE — A9577 INJ MULTIHANCE: HCPCS | Performed by: EMERGENCY MEDICINE

## 2025-06-18 PROCEDURE — 85025 COMPLETE CBC W/AUTO DIFF WBC: CPT | Performed by: PHYSICIAN ASSISTANT

## 2025-06-18 RX ORDER — DEXAMETHASONE SODIUM PHOSPHATE 4 MG/ML
4 INJECTION, SOLUTION INTRA-ARTICULAR; INTRALESIONAL; INTRAMUSCULAR; INTRAVENOUS; SOFT TISSUE EVERY 6 HOURS SCHEDULED
Status: DISCONTINUED | OUTPATIENT
Start: 2025-06-18 | End: 2025-06-28

## 2025-06-18 RX ORDER — LORAZEPAM 1 MG/1
1 TABLET ORAL ONCE
Status: COMPLETED | OUTPATIENT
Start: 2025-06-18 | End: 2025-06-18

## 2025-06-18 RX ORDER — SODIUM CHLORIDE 0.9 % (FLUSH) 0.9 %
10 SYRINGE (ML) INJECTION AS NEEDED
Status: DISCONTINUED | OUTPATIENT
Start: 2025-06-18 | End: 2025-06-28 | Stop reason: HOSPADM

## 2025-06-18 RX ORDER — HYDROMORPHONE HYDROCHLORIDE 1 MG/ML
0.5 INJECTION, SOLUTION INTRAMUSCULAR; INTRAVENOUS; SUBCUTANEOUS ONCE
Refills: 0 | Status: COMPLETED | OUTPATIENT
Start: 2025-06-18 | End: 2025-06-18

## 2025-06-18 RX ADMIN — LORAZEPAM 1 MG: 1 TABLET ORAL at 20:32

## 2025-06-18 RX ADMIN — HYDROMORPHONE HYDROCHLORIDE 0.5 MG: 1 INJECTION, SOLUTION INTRAMUSCULAR; INTRAVENOUS; SUBCUTANEOUS at 23:35

## 2025-06-18 RX ADMIN — DEXAMETHASONE SODIUM PHOSPHATE 4 MG: 4 INJECTION, SOLUTION INTRA-ARTICULAR; INTRALESIONAL; INTRAMUSCULAR; INTRAVENOUS; SOFT TISSUE at 22:46

## 2025-06-18 RX ADMIN — GADOBENATE DIMEGLUMINE 19 ML: 529 INJECTION, SOLUTION INTRAVENOUS at 22:00

## 2025-06-18 NOTE — Clinical Note
Level of Care: Telemetry [5]   Diagnosis: Brain mass [[id number]]   Admitting Physician: RAOUL OLIVIA III [[id number]]   Attending Physician: RAOUL OLIVIA III [[id number]]   Bed Request Comments: tele obs

## 2025-06-19 LAB
ALBUMIN SERPL-MCNC: 4.3 G/DL (ref 3.5–5.2)
ALBUMIN/GLOB SERPL: 1.2 G/DL
ALP SERPL-CCNC: 77 U/L (ref 39–117)
ALT SERPL W P-5'-P-CCNC: 15 U/L (ref 1–33)
ANION GAP SERPL CALCULATED.3IONS-SCNC: 13 MMOL/L (ref 5–15)
AST SERPL-CCNC: 16 U/L (ref 1–32)
BASOPHILS # BLD AUTO: 0.01 10*3/MM3 (ref 0–0.2)
BASOPHILS NFR BLD AUTO: 0.2 % (ref 0–1.5)
BILIRUB SERPL-MCNC: 0.5 MG/DL (ref 0–1.2)
BUN SERPL-MCNC: 10.6 MG/DL (ref 6–20)
BUN/CREAT SERPL: 19.3 (ref 7–25)
CALCIUM SPEC-SCNC: 9.9 MG/DL (ref 8.6–10.5)
CHLORIDE SERPL-SCNC: 100 MMOL/L (ref 98–107)
CO2 SERPL-SCNC: 24 MMOL/L (ref 22–29)
CREAT SERPL-MCNC: 0.55 MG/DL (ref 0.57–1)
DEPRECATED RDW RBC AUTO: 41.7 FL (ref 37–54)
EGFRCR SERPLBLD CKD-EPI 2021: 110.4 ML/MIN/1.73
EOSINOPHIL # BLD AUTO: 0 10*3/MM3 (ref 0–0.4)
EOSINOPHIL NFR BLD AUTO: 0 % (ref 0.3–6.2)
ERYTHROCYTE [DISTWIDTH] IN BLOOD BY AUTOMATED COUNT: 12 % (ref 12.3–15.4)
GLOBULIN UR ELPH-MCNC: 3.6 GM/DL
GLUCOSE BLDC GLUCOMTR-MCNC: 156 MG/DL (ref 70–130)
GLUCOSE SERPL-MCNC: 126 MG/DL (ref 65–99)
HCT VFR BLD AUTO: 44 % (ref 34–46.6)
HGB BLD-MCNC: 15.1 G/DL (ref 12–15.9)
IMM GRANULOCYTES # BLD AUTO: 0.04 10*3/MM3 (ref 0–0.05)
IMM GRANULOCYTES NFR BLD AUTO: 0.8 % (ref 0–0.5)
LYMPHOCYTES # BLD AUTO: 0.45 10*3/MM3 (ref 0.7–3.1)
LYMPHOCYTES NFR BLD AUTO: 8.7 % (ref 19.6–45.3)
MCH RBC QN AUTO: 32.1 PG (ref 26.6–33)
MCHC RBC AUTO-ENTMCNC: 34.3 G/DL (ref 31.5–35.7)
MCV RBC AUTO: 93.4 FL (ref 79–97)
MONOCYTES # BLD AUTO: 0.04 10*3/MM3 (ref 0.1–0.9)
MONOCYTES NFR BLD AUTO: 0.8 % (ref 5–12)
NEUTROPHILS NFR BLD AUTO: 4.66 10*3/MM3 (ref 1.7–7)
NEUTROPHILS NFR BLD AUTO: 89.5 % (ref 42.7–76)
NRBC BLD AUTO-RTO: 0 /100 WBC (ref 0–0.2)
PLATELET # BLD AUTO: 267 10*3/MM3 (ref 140–450)
PMV BLD AUTO: 9.8 FL (ref 6–12)
POTASSIUM SERPL-SCNC: 3.9 MMOL/L (ref 3.5–5.2)
PROT SERPL-MCNC: 7.9 G/DL (ref 6–8.5)
RBC # BLD AUTO: 4.71 10*6/MM3 (ref 3.77–5.28)
SODIUM SERPL-SCNC: 137 MMOL/L (ref 136–145)
WBC NRBC COR # BLD AUTO: 5.2 10*3/MM3 (ref 3.4–10.8)

## 2025-06-19 PROCEDURE — 85025 COMPLETE CBC W/AUTO DIFF WBC: CPT | Performed by: PHYSICIAN ASSISTANT

## 2025-06-19 PROCEDURE — 99223 1ST HOSP IP/OBS HIGH 75: CPT | Performed by: INTERNAL MEDICINE

## 2025-06-19 PROCEDURE — 99222 1ST HOSP IP/OBS MODERATE 55: CPT

## 2025-06-19 PROCEDURE — 25010000002 DEXAMETHASONE PER 1 MG: Performed by: PHYSICIAN ASSISTANT

## 2025-06-19 PROCEDURE — 25010000002 MORPHINE PER 10 MG: Performed by: INTERNAL MEDICINE

## 2025-06-19 PROCEDURE — 82948 REAGENT STRIP/BLOOD GLUCOSE: CPT

## 2025-06-19 PROCEDURE — 80053 COMPREHEN METABOLIC PANEL: CPT | Performed by: PHYSICIAN ASSISTANT

## 2025-06-19 RX ORDER — ACETAMINOPHEN 325 MG/1
650 TABLET ORAL EVERY 4 HOURS PRN
Status: DISCONTINUED | OUTPATIENT
Start: 2025-06-19 | End: 2025-06-28 | Stop reason: HOSPADM

## 2025-06-19 RX ORDER — BISACODYL 5 MG/1
5 TABLET, DELAYED RELEASE ORAL DAILY PRN
Status: DISCONTINUED | OUTPATIENT
Start: 2025-06-19 | End: 2025-06-28 | Stop reason: HOSPADM

## 2025-06-19 RX ORDER — SODIUM CHLORIDE 9 MG/ML
40 INJECTION, SOLUTION INTRAVENOUS AS NEEDED
Status: DISCONTINUED | OUTPATIENT
Start: 2025-06-19 | End: 2025-06-28 | Stop reason: HOSPADM

## 2025-06-19 RX ORDER — SODIUM CHLORIDE 0.9 % (FLUSH) 0.9 %
10 SYRINGE (ML) INJECTION AS NEEDED
Status: DISCONTINUED | OUTPATIENT
Start: 2025-06-19 | End: 2025-06-24 | Stop reason: SDUPTHER

## 2025-06-19 RX ORDER — ASPIRIN 81 MG/1
81 TABLET ORAL DAILY
COMMUNITY
End: 2025-06-28 | Stop reason: HOSPADM

## 2025-06-19 RX ORDER — AMOXICILLIN 250 MG
2 CAPSULE ORAL 2 TIMES DAILY PRN
Status: DISCONTINUED | OUTPATIENT
Start: 2025-06-19 | End: 2025-06-28 | Stop reason: HOSPADM

## 2025-06-19 RX ORDER — POLYETHYLENE GLYCOL 3350 17 G/17G
17 POWDER, FOR SOLUTION ORAL DAILY PRN
Status: DISCONTINUED | OUTPATIENT
Start: 2025-06-19 | End: 2025-06-28 | Stop reason: HOSPADM

## 2025-06-19 RX ORDER — BISACODYL 10 MG
10 SUPPOSITORY, RECTAL RECTAL DAILY PRN
Status: DISCONTINUED | OUTPATIENT
Start: 2025-06-19 | End: 2025-06-28 | Stop reason: HOSPADM

## 2025-06-19 RX ORDER — MORPHINE SULFATE 2 MG/ML
2 INJECTION, SOLUTION INTRAMUSCULAR; INTRAVENOUS EVERY 4 HOURS PRN
Status: DISPENSED | OUTPATIENT
Start: 2025-06-19 | End: 2025-06-26

## 2025-06-19 RX ORDER — ONDANSETRON 2 MG/ML
4 INJECTION INTRAMUSCULAR; INTRAVENOUS EVERY 6 HOURS PRN
Status: DISCONTINUED | OUTPATIENT
Start: 2025-06-19 | End: 2025-06-28 | Stop reason: HOSPADM

## 2025-06-19 RX ORDER — SODIUM CHLORIDE 0.9 % (FLUSH) 0.9 %
10 SYRINGE (ML) INJECTION EVERY 12 HOURS SCHEDULED
Status: DISCONTINUED | OUTPATIENT
Start: 2025-06-19 | End: 2025-06-28 | Stop reason: HOSPADM

## 2025-06-19 RX ORDER — ACETAMINOPHEN 160 MG/5ML
650 SOLUTION ORAL EVERY 4 HOURS PRN
Status: DISCONTINUED | OUTPATIENT
Start: 2025-06-19 | End: 2025-06-28 | Stop reason: HOSPADM

## 2025-06-19 RX ORDER — HYDROCODONE BITARTRATE AND ACETAMINOPHEN 5; 325 MG/1; MG/1
1 TABLET ORAL EVERY 6 HOURS PRN
Refills: 0 | Status: DISCONTINUED | OUTPATIENT
Start: 2025-06-19 | End: 2025-06-20

## 2025-06-19 RX ORDER — ACETAMINOPHEN 650 MG/1
650 SUPPOSITORY RECTAL EVERY 4 HOURS PRN
Status: DISCONTINUED | OUTPATIENT
Start: 2025-06-19 | End: 2025-06-28 | Stop reason: HOSPADM

## 2025-06-19 RX ADMIN — MORPHINE SULFATE 2 MG: 2 INJECTION, SOLUTION INTRAMUSCULAR; INTRAVENOUS at 22:01

## 2025-06-19 RX ADMIN — MORPHINE SULFATE 2 MG: 2 INJECTION, SOLUTION INTRAMUSCULAR; INTRAVENOUS at 15:11

## 2025-06-19 RX ADMIN — HYDROCODONE BITARTRATE AND ACETAMINOPHEN 1 TABLET: 5; 325 TABLET ORAL at 02:24

## 2025-06-19 RX ADMIN — DEXAMETHASONE SODIUM PHOSPHATE 4 MG: 4 INJECTION, SOLUTION INTRA-ARTICULAR; INTRALESIONAL; INTRAMUSCULAR; INTRAVENOUS; SOFT TISSUE at 05:35

## 2025-06-19 RX ADMIN — HYDROCODONE BITARTRATE AND ACETAMINOPHEN 1 TABLET: 5; 325 TABLET ORAL at 11:33

## 2025-06-19 RX ADMIN — Medication 5 MG: at 22:01

## 2025-06-19 RX ADMIN — Medication 10 ML: at 08:06

## 2025-06-19 RX ADMIN — DEXAMETHASONE SODIUM PHOSPHATE 4 MG: 4 INJECTION, SOLUTION INTRA-ARTICULAR; INTRALESIONAL; INTRAMUSCULAR; INTRAVENOUS; SOFT TISSUE at 18:08

## 2025-06-19 RX ADMIN — DEXAMETHASONE SODIUM PHOSPHATE 4 MG: 4 INJECTION, SOLUTION INTRA-ARTICULAR; INTRALESIONAL; INTRAMUSCULAR; INTRAVENOUS; SOFT TISSUE at 11:33

## 2025-06-19 RX ADMIN — MORPHINE SULFATE 2 MG: 2 INJECTION, SOLUTION INTRAMUSCULAR; INTRAVENOUS at 08:04

## 2025-06-19 RX ADMIN — Medication 10 ML: at 22:02

## 2025-06-19 RX ADMIN — HYDROCODONE BITARTRATE AND ACETAMINOPHEN 1 TABLET: 5; 325 TABLET ORAL at 18:06

## 2025-06-19 NOTE — CONSULTS
Reason for consultation: Brain mass    Referring Physician:  No ref. provider found     Chief complaint headaches    Admit Diagnosis:   Brain mass [G93.89]     History of present illness:  This is a 50-year-old female with a past medical history significant for anxiety and depression that presented to the [facility] ED last night with about 48 hours of worsening headaches.  Her PCP ultimately ordered a CT scan that was concerning for an underlying mass in the left occipital lobe.  She also reports worsening vision for about the past 6 months along with generalized weakness and dizziness.  I spoke with the ED provider last night and I recommended further imaging with an MRI of the brain with and without contrast and initiation of steroids. She is extremely anxious during my bedside visit today and tearful.  She tells me that she has noticed worsening vision for the last 6 months and some mild memory loss but tries to avoid going to the doctor.  Tells me that her headaches have significantly increased within the past 2 days along with some blurry of her vision.  She also tells me that she has noticed a little bit of numbness and tingling in her hands for the past few months as well.  She rates her headache as moderate this morning.  She denies any other neurologic symptoms.    ROS:  A 12 point review of systems was performed.  All systems reviewed were negative with the exception of those mentioned in the history of present illness.    Past medical history:  Past Medical History:   Diagnosis Date    Anxiety and depression 06/17/2014       Past surgical history:  Past Surgical History:   Procedure Laterality Date    APPENDECTOMY      CHOLECYSTECTOMY      GASTRIC SLEEVE LAPAROSCOPIC      HYSTERECTOMY         Social history:  Social History     Socioeconomic History    Marital status:    Tobacco Use    Smoking status: Never   Vaping Use    Vaping status: Never Used   Substance and Sexual Activity     Alcohol use: Never    Drug use: Never    Sexual activity: Defer       Family history:  History reviewed. No pertinent family history.    Allergies:  No Known Allergies    Outpatient medications:  Scheduled Meds:dexAMETHasone, 4 mg, Intravenous, Q6H  melatonin, 5 mg, Oral, Nightly  sodium chloride, 10 mL, Intravenous, Q12H      Continuous Infusions:   PRN Meds:.  acetaminophen **OR** acetaminophen **OR** acetaminophen    senna-docusate sodium **AND** polyethylene glycol **AND** bisacodyl **AND** bisacodyl    HYDROcodone-acetaminophen    Morphine    ondansetron    [COMPLETED] Insert Peripheral IV **AND** sodium chloride    sodium chloride    sodium chloride    Physical Examination:  General: Awake, alert, and oriented.  Sitting up in the bed.  She is tearful.  Vitals:    25 0850   BP: 155/88   Pulse: 82   Resp:    Temp:    SpO2: 94%     Systolic (24hrs), Av , Min:105 , Max:174     Diastolic (24hrs), Av, Min:63, Max:90    Pulse  Av  Min: 60  Max: 82    Temp (24hrs), Av.5 °F (36.9 °C), Min:98.5 °F (36.9 °C), Max:98.5 °F (36.9 °C)      Neurological: Cranial nerves II through XII are grossly intact.  There is mild right pronator drift.  Finger to nose testing is performed without dysmetria or bradykinesia.  Sensation is intact to light touch throughout.  Negative Torrey's, negative clonus.  Musculoskeletal: 5 out of 5 strength both proximally and distally in all 4 extremities  Vascular: 2+ radial pulses bilaterally.  Cardiovascular: Regular rate and rhythm.  Extremities: No clubbing, cyanosis, or edema.  Pulmonary: Normal effort and excursion.  No evidence of respiratory distress.  Psychiatric: She is anxious and tearful.  HEENT: Normocephalic, atraumatic.  Eyes: No scleral icterus.  Extraocular eye movements are intact, and pupils are equal, round, and reactive to light.  No nystagmus.  Peripheral visual fields are intact. Overall, no obvious visual fields deficit on exam.     Imaging:  All  imaging reviewed and independently interpreted.    After my review an MRI of the brain with and without contrast that was completed on 6/18/2025 there is an approximately 5 cm x 6.2 cm large left occipital mass arising from the dura.  Differentials include meningioma.  The mass does invade the adjacent superior sagittal sinus.  There is mass effect in the left occipital lobe with some narrowing of the left lateral ventricle.  No midline shift.      Assessment and Plan:    Brain mass [G93.89]     This is a 50-year-old female with an approximately 2-day history of significantly worsening headaches.  She also reports worsening dizziness and blurry vision over the past several months.  MRI of the brain with and without contrast concerning for left occipital mass.  Meningioma is high on the list of differentials.  I had a lengthy and protracted conversation with the patient at the bedside this morning about our findings and discussed neurosurgical intervention.  I am going to speak with Dr. Mitchell this morning and see if there is any way we can add her to our surgical line up.  If not, we may be able to discharge her and have her follow-up as an outpatient to discuss scheduling surgery in the next few weeks.  For now, would like to continue Decadron 4 mg every 6.  I will discuss possibly starting Keppra with Dr. Mitchell this morning.  Further recommendations to follow pending availability of our upcoming surgical line up.  All questions were answered at the bedside this morning with the patient and her cousin was on FaceTime as well.  I appreciate the consult.  Again, once I checked our surgical schedule and find a time for her I will update the patient and hospitalist.  Neurosurgery will continue to follow closely.    Jaleesa Carlson PA-C

## 2025-06-19 NOTE — ED PROVIDER NOTES
EMERGENCY DEPARTMENT ENCOUNTER    Pt Name: Evelia Ryan  MRN: [id number]  Pt :   1975  Room Number:    Date of encounter:  2025  PCP: Nina Cheng APRN  ED Provider: BATOOL Talavera    Historian: Patient    HPI:  Chief Complaint: Abnormal brain imaging outpatient     Context: Evelia Ryan is a 50 y.o. female who presents to the ED c/o concerns following a CT scan of her head that revealed a 5 cm mass. She reports experiencing headaches for at least 48 hours straight, along with lethargy, vision changes, and memory loss. The patient notes pressure in her head and states that when she would lay on a certain part of her head, it would hurt. She also mentions having bumps on her head. The patient initially sought medical attention due to issues with her hands but was redirected to address the more pressing concern of the mass found on the CT scan. She reports being very lethargic in the last two days, which is unusual for her. The patient is going through menopause and experiences hot flashes. She had previously attributed her memory loss and vision changes to menopause. The patient's father had a shunt placed years ago, which led her to consider the possibility of a similar issue. Past surgeries include a partial hysterectomy (ovaries retained), ACL surgery on both knees with one in the late 1980s, gallbladder removal, appendectomy, and gastric sleeve surgery.  HPI     REVIEW OF SYSTEMS  A chief complaint appropriate review of systems was completed and is negative except as noted in the HPI.     PAST MEDICAL HISTORY  No past medical history on file.    PAST SURGICAL HISTORY  No past surgical history on file.    FAMILY HISTORY  No family history on file.    SOCIAL HISTORY  Social History     Socioeconomic History    Marital status:        ALLERGIES  Patient has no known allergies.    PHYSICAL EXAM  Physical Exam  Vitals and nursing note reviewed.   Constitutional:       General:  She is not in acute distress.     Appearance: Normal appearance. She is not ill-appearing.   HENT:      Head: Normocephalic.      Nose: Nose normal.      Mouth/Throat:      Mouth: Mucous membranes are moist.   Eyes:      Extraocular Movements: Extraocular movements intact.   Cardiovascular:      Rate and Rhythm: Normal rate.      Pulses: Normal pulses.   Pulmonary:      Effort: Pulmonary effort is normal.      Breath sounds: Normal breath sounds.   Abdominal:      General: There is no distension.   Musculoskeletal:         General: Normal range of motion.      Cervical back: Normal range of motion and neck supple.   Skin:     General: Skin is warm and dry.   Neurological:      General: No focal deficit present.      Mental Status: She is alert.   Psychiatric:         Mood and Affect: Mood is anxious.       LAB RESULTS  Results for orders placed or performed during the hospital encounter of 06/18/25   Comprehensive Metabolic Panel    Collection Time: 06/18/25  8:23 PM    Specimen: Blood   Result Value Ref Range    Glucose 94 65 - 99 mg/dL    BUN 10.3 6.0 - 20.0 mg/dL    Creatinine 0.60 0.57 - 1.00 mg/dL    Sodium 141 136 - 145 mmol/L    Potassium 3.6 3.5 - 5.2 mmol/L    Chloride 102 98 - 107 mmol/L    CO2 27.0 22.0 - 29.0 mmol/L    Calcium 9.2 8.6 - 10.5 mg/dL    Total Protein 6.8 6.0 - 8.5 g/dL    Albumin 4.2 3.5 - 5.2 g/dL    ALT (SGPT) 14 1 - 33 U/L    AST (SGOT) 17 1 - 32 U/L    Alkaline Phosphatase 67 39 - 117 U/L    Total Bilirubin 0.4 0.0 - 1.2 mg/dL    Globulin 2.6 gm/dL    A/G Ratio 1.6 g/dL    BUN/Creatinine Ratio 17.2 7.0 - 25.0    Anion Gap 12.0 5.0 - 15.0 mmol/L    eGFR 108.2 >60.0 mL/min/1.73   CBC Auto Differential    Collection Time: 06/18/25  8:23 PM    Specimen: Blood   Result Value Ref Range    WBC 6.60 3.40 - 10.80 10*3/mm3    RBC 4.53 3.77 - 5.28 10*6/mm3    Hemoglobin 14.3 12.0 - 15.9 g/dL    Hematocrit 42.3 34.0 - 46.6 %    MCV 93.4 79.0 - 97.0 fL    MCH 31.6 26.6 - 33.0 pg    MCHC 33.8 31.5 -  35.7 g/dL    RDW 12.1 (L) 12.3 - 15.4 %    RDW-SD 41.6 37.0 - 54.0 fl    MPV 9.6 6.0 - 12.0 fL    Platelets 252 140 - 450 10*3/mm3    Neutrophil % 75.7 42.7 - 76.0 %    Lymphocyte % 16.7 (L) 19.6 - 45.3 %    Monocyte % 5.6 5.0 - 12.0 %    Eosinophil % 1.1 0.3 - 6.2 %    Basophil % 0.6 0.0 - 1.5 %    Immature Grans % 0.3 0.0 - 0.5 %    Neutrophils, Absolute 5.00 1.70 - 7.00 10*3/mm3    Lymphocytes, Absolute 1.10 0.70 - 3.10 10*3/mm3    Monocytes, Absolute 0.37 0.10 - 0.90 10*3/mm3    Eosinophils, Absolute 0.07 0.00 - 0.40 10*3/mm3    Basophils, Absolute 0.04 0.00 - 0.20 10*3/mm3    Immature Grans, Absolute 0.02 0.00 - 0.05 10*3/mm3    nRBC 0.0 0.0 - 0.2 /100 WBC       If labs were ordered, I independently reviewed the results and considered them in treating the patient.    RADIOLOGY  MRI Brain With & Without Contrast   Final Result   Impression:   Left occipital convexity extra-axial appearing mass measuring up to 6.2 cm, most likely a partially calcified meningioma. The mass abuts and likely invades the adjacent superior sagittal sinus which otherwise remains patent. Mass effect on the left    occipital lobe with narrowing of the left lateral ventricle and without significant herniation or midline shift.            Electronically Signed: Greg Sotelo MD     6/18/2025 10:26 PM EDT     Workstation ID: [id number]      CT Outside Head   Final Result        [x] Radiologist's Report Reviewed:  I ordered and independently interpreted the above noted radiographic studies.  See radiologist's dictation for official interpretation.      PROCEDURES    Procedures    No orders to display       MEDICATIONS GIVEN IN ER    Medications   sodium chloride 0.9 % flush 10 mL (has no administration in time range)   dexAMETHasone (DECADRON) injection 4 mg (4 mg Intravenous Given 6/18/25 2246)   LORazepam (ATIVAN) tablet 1 mg (1 mg Oral Given 6/18/25 2032)   gadobenate dimeglumine (MULTIHANCE) injection 20 mL (19 mL Intravenous Given  6/18/25 2200)   HYDROmorphone (DILAUDID) injection 0.5 mg (0.5 mg Intravenous Given 6/18/25 2335)       MEDICAL DECISION MAKING, PROGRESS, and CONSULTS   Medical Decision Making  50-year-old nontoxic-appearing female presented to the emergency department for evaluation of lethargy, headaches, visual changes, and memory loss over the past 48 hours. She was initially seen by her primary care provider, who ordered outpatient imaging. A CT scan of the head revealed a brain mass, prompting referral to the ED for further evaluation. On examination, the patient exhibited no focal neurologic deficits. Laboratory studies showed no acute abnormalities. An MRI of the brain with and without contrast was performed, demonstrating a 6.2 cm left occipital convexity extra-axial mass, most consistent with a partially calcified meningioma. The mass abuts and likely invades the adjacent superior sagittal sinus, which remains patent. There is mass effect on the left occipital lobe with narrowing of the left lateral ventricle, but no significant herniation or midline shift. Neurosurgery was consulted and recommended no immediate surgical intervention, advising initiation of dexamethasone 4 mg every 6 hours and admission to hospital medicine for further management. The patient remains hemodynamically stable.    Problems Addressed:  Brain mass: complicated acute illness or injury    Amount and/or Complexity of Data Reviewed  Labs: ordered. Decision-making details documented in ED Course.  Radiology: ordered and independent interpretation performed. Decision-making details documented in ED Course.    Risk  Prescription drug management.  Decision regarding hospitalization.        Discussion below represents my analysis of pertinent findings related to patient's condition, differential diagnosis, treatment plan and final disposition.    Assessment includes with Differential diagnosis including but is not limited to:   The patient presents  with a concerning 5 cm mass identified on a head CT scan, associated with persistent headache, lethargy, vision changes, and memory loss.    **Differential Diagnosis:**  1. Intracranial tumor (primary or metastatic)  2. Meningioma  3. Cerebral abscess  4. Large cystic lesion    Additional sources  Discussed/ obtained information from independent historians:   [] Spouse  [] Parent  [] Family member  [] Friend  [] EMS   [] Other:  External (non-ED) record review:   [] Inpatient record:   [] Office record:   [] Outpatient record:   [] Prior Outpatient labs:   [] Prior Outpatient radiology:   [] Primary Care record:   [] Outside ED record:   [] Other:   Patient's care impacted by:   [] Diabetes  [] Hypertension  [] Hyperlipidemia  [] Hypothyroidism   [] Coronary Artery Disease  [] Congestive Heart Failure   [] COPD   [] Cancer   [] Obesity  [] GERD   [] Tobacco Abuse   [] Substance Abuse    [] Anxiety   [] Depression   [] Other:   Care significantly affected by Social Determinants of Health (housing and economic circumstances, unemployment)    [] Yes     [x] No   If yes, Patient's care significantly limited by  Social Determinants of Health including:   [] Inadequate housing   [] Low income   [] Alcoholism and drug addiction in family   [] Problems related to primary support group   [] Unemployment   [] Problems related to employment   [] Other Social Determinants of Health:     Orders placed during this visit:  Orders Placed This Encounter   Procedures    MRI Brain With & Without Contrast    CT Outside Head    Comprehensive Metabolic Panel    CBC Auto Differential    Insert Peripheral IV    CBC & Differential     ED Course:    ED Course as of 06/18/25 2344 Wed Jun 18, 2025 1947 Imaging reviewed with Dr. Walden with recommendation made for consult to Neurosurgery and admission.  [JG]   1948 Neurosurgery paged [JG]   1949 Vitals and Telemetry tracing was reviewed and directly interpreted by myself demonstrating blood  pressure 174/90, temperature 98.5 °F, heart rate of 60, respirations 17 breaths/min oxygen saturation 97% on room air [JG]   1950 BP: 174/90 [JG]   1950 Temp: 98.5 °F (36.9 °C) [JG]   1950 Heart Rate: 60 [JG]   1950 Resp: 17 [JG]   1950 SpO2: 97 % [JG]   1954 I have spoken with Jaleesa Carlson with Neurosurgery who recommends MRI W/WO and admission to medicine.  [JG]   1956 Personally reviewed findings of outpatient CT that demonstrated 4.8 x 4.4 x5.9 cm mass in the left occipital lobe with associated calcification. Mild mass effect on posterior falx and posterior horn left lateral ventricle. Mild surrounding edema.  [JG]   2213 Neurosurgery reviewed imaging with recommendation to admit to hospital medicine. Decadron 4mg q6h [JG]   2231 MRI brain with/without contrast personally interpreted by myself and with official read provided by radiology demonstrates a left occipital convexity extra-axial appearing mass measuring up to 6.2 cm, most likely a partially calcified meningioma; mass abuts and likely invades the adjacent superior sagittal sinus which otherwise remains patent. There is mass effect on the left occipital lobe with narrowing of the left lateral ventricle and without significant herniation or midline shift. [JG]   2256 Hospital medicine consulted for admission; Dr. Roth [JG]   2332 Hospital medicine agreeable to accept patient for admission [JG]   2332 Based on the patient's presentation, history and diffuse work-up in the emergency department, the patient is deemed appropriate for admission to the hospital for further evaluation and treatment.  This was discussed with the patient at bedside.  They are in agreement with the current medical management.    Admitting physician: Dr. Roth    Discussion was had with admitting physician regarding the laboratory and imaging findings.  We did discuss current therapeutics in the emergency department and progression of the patient.  Working diagnosis was  conveyed to the admitting physician, as well as current status and prognosis for the patient.  They are in agreement with these findings and have accepted admission.    Shared decision making:   After full review of the patient's clinical presentation, review of any work-up including but not limited to laboratory studies and radiology obtained, I had a discussion with the patient.  Treatment options were discussed as well as the risks, benefits and consequences.  I discussed all findings with the patient and family members if available.  During the discussion, treatment goals were understood by all as well as any misconceptions which were addressed with the patient.  Ample time was given for any questions they may have had.  They are in agreement with the treatment plan as well as final disposition  [JG]      ED Course User Index  [JG] Tin Pollock PA            DIAGNOSIS  Final diagnoses:   Brain mass     DISPOSITION    ED Disposition       ED Disposition   Decision to Admit    Condition   --    Comment   --                Tin Pollock PA  06/18/25 8571

## 2025-06-19 NOTE — CONSULTS
I visited this patient to assist with advance care planning. I provided the patient a copy of our living will for her review. I encouraged her to request a follow up visit when she is ready to complete it. We also engaged in spiritual care during this visit, and I will will continue to follow for spiritual needs. Please re-consult if requested or if another need arises.

## 2025-06-19 NOTE — PLAN OF CARE
Goal Outcome Evaluation:  Plan of Care Reviewed With: patient        Progress: no change        Pt Aox4, o2 sat 89%- placed on 1L NC, o2 now 96%. Bradycardic while asleep. Headache not resolving with pain medication.

## 2025-06-19 NOTE — PROGRESS NOTES
I was able to add the patient onto our surgical schedule next Wednesday.  I would like for her to have at least 72 hours of high-dose steroids.  I am going to hold off on Keppra for now as it does not sound like the patient has had any seizure-like activity.  I am also going to have my colleague Sun John PA-C set the patient up with Dr. Gu for an angiogram/embolization for next Tuesday to help with surgical planning and mapping out the blood supply to her meningioma.  I will need to get a new MRI of the brain with and without contrast on either Monday or Tuesday with fiducials for surgical planning.  Please reach out with any further questions or concerns.  Patient updated at the bedside alongside Dr. Mitchell.    Jaleesa Carlson PA-C

## 2025-06-19 NOTE — CASE MANAGEMENT/SOCIAL WORK
Discharge Planning Assessment  [facility]     Patient Name: Evelia Ryan  MRN: [id number]  Today's Date: 6/19/2025    Admit Date: 6/18/2025    Plan: Home   Discharge Needs Assessment       Row Name 06/19/25 0845       Living Environment    People in Home child(cici), adult;spouse    Current Living Arrangements home    Potentially Unsafe Housing Conditions none    Primary Care Provided by self    Provides Primary Care For no one    Family Caregiver if Needed child(cici), adult;spouse    Quality of Family Relationships unable to assess    Able to Return to Prior Arrangements yes       Resource/Environmental Concerns    Resource/Environmental Concerns none    Transportation Concerns none       Transition Planning    Patient/Family Anticipates Transition to home with family    Patient/Family Anticipated Services at Transition     Transportation Anticipated family or friend will provide       Discharge Needs Assessment    Readmission Within the Last 30 Days no previous admission in last 30 days    Equipment Currently Used at Home none    Concerns to be Addressed discharge planning    Anticipated Changes Related to Illness none                   Discharge Plan       Row Name 06/19/25 0846       Plan    Plan Home    Patient/Family in Agreement with Plan yes    Plan Comments Spoke with patient in room to initiate discharge planning.  She lives with her  and adult children in [facility].  She is independent with ADL's.  She has no DME at home and is not current with home health.  Her PCP is Nina Cheng.  She does not have an advanced directive.  Verified that she has [employer].  Ms. Ryan has RX coverage and has her scripts filled at [facility].  Her goal is to return home at discharge.  No needs noted at this time.  CM will continue to follow.    Final Discharge Disposition Code 01 - home or self-care                    Expected Discharge Date and Time       Expected Discharge Date Expected  Discharge Time    Jun 25, 2025            Demographic Summary       Row Name 06/19/25 0845       General Information    Admission Type inpatient    Arrived From emergency department    Referral Source admission list    Reason for Consult discharge planning    Preferred Language English                   Functional Status       Row Name 06/19/25 0845       Functional Status    Usual Activity Tolerance good    Current Activity Tolerance good       Functional Status, IADL    Medications independent    Meal Preparation independent    Housekeeping independent    Laundry independent    Shopping independent                   Psychosocial    No documentation.                  Abuse/Neglect    No documentation.                  Legal    No documentation.                  Substance Abuse    No documentation.                  Patient Forms    No documentation.                     Graciela Douglass RN

## 2025-06-19 NOTE — H&P
[facility] Medicine Services  HISTORY AND PHYSICAL    Patient Name: Evelia Ryan  : 1975  MRN: [id number]  Primary Care Physician: Nina Cheng APRN  Date of admission: 2025    Subjective   Subjective     Chief Complaint:  headaches    HPI:  Evelia Ryan is a 50 y.o. female with a past medical history significant for anxiety/depression presents today with abnormal imaging. States she has had persistent headaches with dizziness, fatigue and vision changes going on for several weeks. States over the past 24 hours headache was intractable despite OTC meds. Characterized as severe dull, global, dull ache with nausea. She does not have a PCP or take any chronic medications at home except ASA. States she saw a doctor outpatient and underwent CT imaging of the head. Was notified today that imaging shows brain mass. She was subsequently directed to ED for further evaluation and treatment.  Currently denies fever, cough, congestion, SOB, or chest pain. No vomiting. No changes in mental studies. No facial droop, changes in speech, focal weakness, syncope, or paresthesias. Will admit to hospital medicine.         Review of Systems   Constitutional:  Positive for fatigue. Negative for chills and fever.   HENT:  Negative for congestion and trouble swallowing.    Eyes:  Positive for visual disturbance. Negative for photophobia.   Respiratory:  Negative for cough and shortness of breath.    Cardiovascular:  Negative for chest pain and leg swelling.   Gastrointestinal:  Positive for nausea. Negative for abdominal pain, diarrhea and vomiting.   Endocrine: Negative for cold intolerance and heat intolerance.   Genitourinary:  Negative for dysuria and flank pain.   Musculoskeletal:  Positive for gait problem. Negative for back pain.   Skin:  Negative for pallor and rash.   Allergic/Immunologic: Negative for immunocompromised state.   Neurological:  Positive for dizziness, weakness,  light-headedness and headaches.   Hematological:  Negative for adenopathy.   Psychiatric/Behavioral:  Negative for agitation and confusion.                 Personal History     Past Medical History:   Diagnosis Date    Anxiety and depression 06/17/2014             Past Surgical History:   Procedure Laterality Date    APPENDECTOMY      CHOLECYSTECTOMY      GASTRIC SLEEVE LAPAROSCOPIC      HYSTERECTOMY         Family History: family history is not on file.     Social History:  reports that she has never smoked. She does not have any smokeless tobacco history on file. She reports that she does not drink alcohol and does not use drugs.  Social History     Social History Narrative    Not on file       Medications:       No Known Allergies    Objective   Objective     Vital Signs:   Temp:  [98.5 °F (36.9 °C)] 98.5 °F (36.9 °C)  Heart Rate:  [60-75] 66  Resp:  [17] 17  BP: (105-174)/(63-90) 105/63    Physical Exam   Constitutional: Awake, alert  Eyes: PERRLA, sclerae anicteric, no conjunctival injection  HENT: NCAT, mucous membranes moist  Neck: Supple, no thyromegaly, no lymphadenopathy, trachea midline  Respiratory: Clear to auscultation bilaterally, nonlabored respirations   Cardiovascular: RRR, no murmurs, rubs, or gallops, palpable pedal pulses bilaterally  Gastrointestinal: Positive bowel sounds, soft, nontender, nondistended  Musculoskeletal: No bilateral ankle edema, no clubbing or cyanosis to extremities  Psychiatric: Appropriate affect, cooperative  Neurologic: Oriented x 3, strength symmetric in all extremities, Cranial Nerves grossly intact to confrontation, speech clear  Skin: No rashes      Result Review:  I have personally reviewed the results from the time of this admission to 6/19/2025 00:52 EDT and agree with these findings:  [x]  Laboratory list / accordion  []  Microbiology  []  Radiology  []  EKG/Telemetry   []  Cardiology/Vascular   []  Pathology  [x]  Old records  []  Other:  Most notable findings  include: vitals stable. Labs favorable. MRI shows brain mass    LAB RESULTS:      Lab 06/18/25 2023   WBC 6.60   HEMOGLOBIN 14.3   HEMATOCRIT 42.3   PLATELETS 252   NEUTROS ABS 5.00   IMMATURE GRANS (ABS) 0.02   LYMPHS ABS 1.10   MONOS ABS 0.37   EOS ABS 0.07   MCV 93.4         Lab 06/18/25 2023   SODIUM 141   POTASSIUM 3.6   CHLORIDE 102   CO2 27.0   ANION GAP 12.0   BUN 10.3   CREATININE 0.60   EGFR 108.2   GLUCOSE 94   CALCIUM 9.2         Lab 06/18/25 2023   TOTAL PROTEIN 6.8   ALBUMIN 4.2   GLOBULIN 2.6   ALT (SGPT) 14   AST (SGOT) 17   BILIRUBIN 0.4   ALK PHOS 67                     Brief Urine Lab Results       None          Microbiology Results (last 10 days)       ** No results found for the last 240 hours. **            MRI Brain With & Without Contrast  Result Date: 6/18/2025  MRI BRAIN W WO CONTRAST Date of Exam: 6/18/2025 9:35 PM EDT Indication: Per mass found on CT outpatient today.  Comparison: Head CT 6/18/2025. Technique:  Routine multiplanar/multisequence sequence images of the brain were obtained before and after the uneventful administration of 19 mL Multihance. Findings: Left occipital convexity mass which appears mildly heterogenously T2 hyperintense, with mild restricted diffusion, T1 isointense, and mildly heterogenous enhancement, with foci of calcification most prominent along its inferomedial aspect. The mass measures 6.2 x 5 x 5 cm (CC by AP by TV) and there is suggestion of dural based attachment and surrounding CSF cleft. The mass abuts and likely invades the adjacent superior sagittal sinus which otherwise remains patent. There is minimal adjacent parenchymal edema. There is mass effect on the left occipital lobe with narrowing of the left lateral ventricle, without herniation or significant midline shift. No visible abnormal parenchymal enhancement. No acute midline shift, extra axial fluid collection, hydrocephalus, or infarct. No subacute to old hemorrhage. Appropriate flow voids  at the skull base and in the major venous sinuses otherwise. Mild mucosal changes in the paranasal sinuses. Mastoid air cells are essentially clear. Visualized globes and orbits appear unremarkable by MRI. No acute or aggressive appearing bone or extracranial soft tissue process.     Impression: Impression: Left occipital convexity extra-axial appearing mass measuring up to 6.2 cm, most likely a partially calcified meningioma. The mass abuts and likely invades the adjacent superior sagittal sinus which otherwise remains patent. Mass effect on the left occipital lobe with narrowing of the left lateral ventricle and without significant herniation or midline shift. Electronically Signed: Greg Sotelo MD  6/18/2025 10:26 PM EDT  Workstation ID: [id number]    CT Outside Head  Result Date: 6/18/2025  This procedure was auto-finalized with no dictation required.    CT Head Without Contrast  Result Date: 6/18/2025  EXAM: CT HEAD WO CONTRAST INDICATION: R51.9: Headache disorder; COMPARISON: None. TECHNIQUE: Axial CT imaging of the head. Axial images and multiplanar reformatted images reviewed.  Individualized dose optimization technique was used in order to meet ALARA standards for radiation dose reduction.  In addition to vendor specific dose reduction algorithms, the dose reduction techniques vary based on the specific scanner utilized but frequently include automated exposure control, adjustment of the mA and/or kV according to patient size, and use of iterative reconstruction technique. CONTRAST: None. FINDINGS: No acute intracranial hemorrhage. Approximately 4.8 x 4.4 x 5.9 cm mass in the left occipital lobe with associated calcification. Mild mass effect on the posterior falx and posterior horn left lateral ventricle. Mild surrounding edema. Gray-white differentiation normal. No acute calvarial abnormalities.  No orbital abnormality. Paranasal sinuses and mastoid air cells clear.    Impression: Approximately 5 cm  mass with associated calcification in the posterior left occipital lobe. MRI with contrast is recommended for further characterization. Electronically signed by Kashmir Perez MD          Assessment & Plan   Assessment & Plan       Brain mass    Anxiety and depression    51 yo female here with brain mass, suspected meningioma    Brain Mass, likely Meningioma  - MRI confirms; Left occipital convexity extra-axial appearing mass measuring up to 6.2 cm, most likely a partially calcified meningioma. The mass abuts and likely invades the adjacent superior sagittal sinus which otherwise remains patent. Mass effect on the left occipital lobe with narrowing of the left lateral ventricle and without significant herniation or midline shift.  - started on scheduled Decadron  - NPO after midnight  - consult to neurosurgery  - neuro checks  - PT/OT    Anxiety  - prn ativan    DVT prophylaxis:  mechanical    CODE STATUS:  full code  Code Status (Patient has no pulse and is not breathing): CPR (Attempt to Resuscitate)  Medical Interventions (Patient has pulse or is breathing): Full Support  Level Of Support Discussed With: Patient      Expected Discharge  TBD      This note has been completed as part of a split-shared workflow.     Signature: Electronically signed by Umesh Delarosa PA-C, 06/19/25, 1:00 AM EDT      Attending   Admission Attestation       I have performed an independent face-to-face diagnostic evaluation including performing an independent physical examination.  I approve of the documented plan of care above that was reviewed and developed with the advanced practice clinician (APC) and take responsibility for that plan along with its associated risks.  I have updated the HPI as appropriate.    Brief HPI    50F states that for the last few weeks she has noticed increasing frequency of global headaches, occasional dizziness, increased fatigue, bilateral hand pain, and especially vision changes.  She states her vision  "has become more more blurry especially over the last couple of weeks.  Her headache became worse over the last 24 hours, with home/over-the-counter agents not helping.  She confirms nausea with the headaches as well but no emesis.  She also states that she has sometimes noticed when lying in bed certain ways, she will have obvious increase in headache specifically in the occipital region.  She states that she \"knew that was probably not good,\" so she went to see her PCP who ordered outpatient imaging, and she states she was called today and told that this revealed a occipital brain mass, and that she was instructed to come to the ED.    Attending Physical Exam:  Temp:  [98.5 °F (36.9 °C)] 98.5 °F (36.9 °C)  Heart Rate:  [60-75] 63  Resp:  [17] 17  BP: (105-174)/(63-90) 110/65    Constitutional: Awake, alert, NAD, very pleasant.  Eyes: PERRLA, sclerae anicteric, no conjunctival injection  HENT: NCAT, mucous membranes moist  Neck: Supple, no thyromegaly, no lymphadenopathy, trachea midline  Respiratory: Clear to auscultation bilaterally, nonlabored respirations   Cardiovascular: RRR, no murmurs, rubs, or gallops, palpable pedal pulses bilaterally  Gastrointestinal: Positive bowel sounds, soft, nontender, nondistended  Musculoskeletal: No bilateral ankle edema, no clubbing or cyanosis to extremities  Psychiatric: Appropriate affect, cooperative  Neurologic: Oriented x 3, strength symmetric in all extremities, Cranial Nerves grossly intact to confrontation, speech clear  Skin: No rashes, normal turgor.    Result Review:  I have personally reviewed the results from the time of this admission to 6/19/2025 01:14 EDT and agree with these findings:  [x]  Laboratory list / accordion  []  Microbiology  [x]  Radiology  []  EKG/Telemetry   []  Cardiology/Vascular   []  Pathology  []  Old records  []  Other:  Most notable findings include: Reviewed radiology reports from CT head and MRI brain.  Reviewed available " labs.    Assessment and Plan:    See assessment and plan documented by APC above and updated/edited by me as appropriate.      Total time spent: 35 minutes  Time spent includes time reviewing chart, face-to-face time, counseling patient/family/caregiver, ordering medications/tests/procedures, communicating with other health care professionals, documenting clinical information in the electronic health record, and coordination of care.       Ghanshyam Roth III, DO  06/19/25

## 2025-06-19 NOTE — PROGRESS NOTES
Received a page this evening about Ms. Ryan. This is a 50 year old female with a 2 day history of worsening headaches and paresthesias into her bilateral upper extremities. Per ED provider, she is neurologically intact. CT scan from OSH concerning for occipital mass. I recommended MRI of the brain with and without contrast. MRI of the brain reviewed this evening. There is a large, left occipital mass measuring approximately 6 cm. Mass appears to be consistent with meningioma. Recommend admission and initiating steroids (Decadron 4mg q 6). Discussed with ED provider. Formal consult to follow in the AM.     Jaleesa Carlson PA-C

## 2025-06-19 NOTE — ED NOTES
Evelia Ryan    Nursing Report ED to Floor:  Mental status:axox4  Ambulatory status: independent  Oxygen Therapy:  ra  Cardiac Rhythm: nsr  Admitted from: ed  Safety Concerns:  n/a  Precautions: n/a  Social Issues: n/a  ED Room #:  03    ED Nurse Phone Extension - [phone] or may call [phone].      HPI:   Chief Complaint   Patient presents with    Abnormal Imaging       Past Medical History:  Past Medical History:   Diagnosis Date    Anxiety and depression 06/17/2014        Past Surgical History:  No past surgical history on file.     Admitting Doctor:   Ghanshyam Roth III, DO    Consulting Provider(s):  Consults       No orders found for last 30 day(s).             Admitting Diagnosis:   The encounter diagnosis was Brain mass.    Most Recent Vitals:   Vitals:    06/18/25 2300 06/18/25 2330 06/19/25 0000 06/19/25 0030   BP: 115/78  110/65 105/63   BP Location:       Patient Position:       Pulse: 67 75 63 66   Resp:       Temp:       TempSrc:       SpO2: 94% 95% 93% 93%   Weight:       Height:           Active LDAs/IV Access:   Lines, Drains & Airways       Active LDAs       Name Placement date Placement time Site Days    Peripheral IV 06/18/25 2034 20 G Right Antecubital 06/18/25 2034  Antecubital  less than 1                    Labs (abnormal labs have a star):   Labs Reviewed   CBC WITH AUTO DIFFERENTIAL - Abnormal; Notable for the following components:       Result Value    RDW 12.1 (*)     Lymphocyte % 16.7 (*)     All other components within normal limits   COMPREHENSIVE METABOLIC PANEL    Narrative:     GFR Categories in Chronic Kidney Disease (CKD)              GFR Category          GFR (mL/min/1.73)    Interpretation  G1                    90 or greater        Normal or high (1)  G2                    60-89                Mild decrease (1)  G3a                   45-59                Mild to moderate decrease  G3b                   30-44                Moderate to severe decrease  G4                     15-29                Severe decrease  G5                    14 or less           Kidney failure    (1)In the absence of evidence of kidney disease, neither GFR category G1 or G2 fulfill the criteria for CKD.    eGFR calculation 2021 CKD-EPI creatinine equation, which does not include race as a factor   CBC AND DIFFERENTIAL    Narrative:     The following orders were created for panel order CBC & Differential.  Procedure                               Abnormality         Status                     ---------                               -----------         ------                     CBC Auto Differential[[id number]]        Abnormal            Final result                 Please view results for these tests on the individual orders.       Meds Given in ED:   Medications   sodium chloride 0.9 % flush 10 mL (has no administration in time range)   dexAMETHasone (DECADRON) injection 4 mg (4 mg Intravenous Given 6/18/25 2246)   LORazepam (ATIVAN) tablet 1 mg (1 mg Oral Given 6/18/25 2032)   gadobenate dimeglumine (MULTIHANCE) injection 20 mL (19 mL Intravenous Given 6/18/25 2200)   HYDROmorphone (DILAUDID) injection 0.5 mg (0.5 mg Intravenous Given 6/18/25 2335)           Last NIH score:                                                          Dysphagia screening results:  Patient Factors Component (Dysphagia:Stroke or Rule-out)  Best Eye Response: 4-->(E4) spontaneous (06/18/25 2132)  Best Motor Response: 6-->(M6) obeys commands (06/18/25 2132)  Best Verbal Response: 5-->(V5) oriented (06/18/25 2132)  [place] Coma Scale Score: 15 (06/18/25 2132)     [place] Coma Scale:  No data recorded     CIWA:        Restraint Type:            Isolation Status:  No active isolations

## 2025-06-19 NOTE — PROGRESS NOTES
"      [facility] Medicine Services  ADMISSION FOLLOW-UP NOTE          Patient admitted after midnight, H&P by my partner performed earlier on today's date reviewed.  Interim findings, labs, and charting also reviewed.        The [facility] Problem List has been managed and updated to include any new diagnoses:  Active Hospital Problems    Diagnosis  POA    **Brain mass [G93.89]  Yes    Anxiety and depression [F41.9, F32.A]  Yes      Resolved Hospital Problems   No resolved problems to display.         ADDITIONAL PLAN:  - detailed assessment and plan from admission reviewed  - 50F states that for the last few weeks she has noticed increasing frequency of global headaches, occasional dizziness, increased fatigue, bilateral hand pain, and especially vision changes. She states her vision has become more more blurry especially over the last couple of weeks. Her headache became worse over the last 24 hours, with home/over-the-counter agents not helping. She confirms nausea with the headaches as well but no emesis. She also states that she has sometimes noticed when lying in bed certain ways, she will have obvious increase in headache specifically in the occipital region. She states that she \"knew that was probably not good,\" so she went to see her PCP who ordered outpatient imaging, and she states she was called today and told that this revealed a occipital brain mass, and that she was instructed to come to the ED     Brain Mass, likely Meningioma  - MRI confirms; Left occipital convexity extra-axial appearing mass measuring up to 6.2 cm, most likely a partially calcified meningioma. The mass abuts and likely invades the adjacent superior sagittal sinus which otherwise remains patent. Mass effect on the left occipital lobe with narrowing of the left lateral ventricle and without significant herniation or midline shift.  - continue Decadron  - neurosurgery following.  Recs 72 hours of high dose " steroids.  Hold on keppra for now since no seizure like activity.  Plans to arrange angiogram/embolization next Tuesday with Dr. Gu to help map out blood supply to mengioma.  Also plans new MRI brain with and without on either Monday or Tuesday.  Plans for surgery with Dr. Mitchell next Wednesday.  - neuro checks  - PT/OT     Anxiety  - prn ativan      Expected Discharge   Expected Discharge Date: 6/25/2025; Expected Discharge Time:      Anup Santacruz MD  06/19/25

## 2025-06-20 PROCEDURE — 99232 SBSQ HOSP IP/OBS MODERATE 35: CPT | Performed by: INTERNAL MEDICINE

## 2025-06-20 PROCEDURE — 25010000002 MORPHINE PER 10 MG: Performed by: INTERNAL MEDICINE

## 2025-06-20 PROCEDURE — 25010000002 DEXAMETHASONE PER 1 MG: Performed by: PHYSICIAN ASSISTANT

## 2025-06-20 RX ORDER — HYDROCODONE BITARTRATE AND ACETAMINOPHEN 7.5; 325 MG/1; MG/1
1 TABLET ORAL EVERY 4 HOURS PRN
Refills: 0 | Status: DISCONTINUED | OUTPATIENT
Start: 2025-06-20 | End: 2025-06-21 | Stop reason: SDUPTHER

## 2025-06-20 RX ORDER — TEMAZEPAM 15 MG/1
15 CAPSULE ORAL NIGHTLY PRN
Status: DISCONTINUED | OUTPATIENT
Start: 2025-06-20 | End: 2025-06-23

## 2025-06-20 RX ADMIN — Medication 10 ML: at 08:09

## 2025-06-20 RX ADMIN — Medication 10 MG: at 20:53

## 2025-06-20 RX ADMIN — MORPHINE SULFATE 2 MG: 2 INJECTION, SOLUTION INTRAMUSCULAR; INTRAVENOUS at 21:03

## 2025-06-20 RX ADMIN — DEXAMETHASONE SODIUM PHOSPHATE 4 MG: 4 INJECTION, SOLUTION INTRA-ARTICULAR; INTRALESIONAL; INTRAMUSCULAR; INTRAVENOUS; SOFT TISSUE at 17:09

## 2025-06-20 RX ADMIN — MORPHINE SULFATE 2 MG: 2 INJECTION, SOLUTION INTRAMUSCULAR; INTRAVENOUS at 06:12

## 2025-06-20 RX ADMIN — HYDROCODONE BITARTRATE AND ACETAMINOPHEN 1 TABLET: 5; 325 TABLET ORAL at 08:08

## 2025-06-20 RX ADMIN — HYDROCODONE BITARTRATE AND ACETAMINOPHEN 1 TABLET: 7.5; 325 TABLET ORAL at 15:06

## 2025-06-20 RX ADMIN — DEXAMETHASONE SODIUM PHOSPHATE 4 MG: 4 INJECTION, SOLUTION INTRA-ARTICULAR; INTRALESIONAL; INTRAMUSCULAR; INTRAVENOUS; SOFT TISSUE at 00:09

## 2025-06-20 RX ADMIN — Medication 10 ML: at 20:55

## 2025-06-20 RX ADMIN — DEXAMETHASONE SODIUM PHOSPHATE 4 MG: 4 INJECTION, SOLUTION INTRA-ARTICULAR; INTRALESIONAL; INTRAMUSCULAR; INTRAVENOUS; SOFT TISSUE at 12:46

## 2025-06-20 RX ADMIN — TEMAZEPAM 15 MG: 15 CAPSULE ORAL at 20:53

## 2025-06-20 RX ADMIN — MORPHINE SULFATE 2 MG: 2 INJECTION, SOLUTION INTRAMUSCULAR; INTRAVENOUS at 12:47

## 2025-06-20 RX ADMIN — MORPHINE SULFATE 2 MG: 2 INJECTION, SOLUTION INTRAMUSCULAR; INTRAVENOUS at 02:15

## 2025-06-20 RX ADMIN — MORPHINE SULFATE 2 MG: 2 INJECTION, SOLUTION INTRAMUSCULAR; INTRAVENOUS at 17:09

## 2025-06-20 RX ADMIN — DEXAMETHASONE SODIUM PHOSPHATE 4 MG: 4 INJECTION, SOLUTION INTRA-ARTICULAR; INTRALESIONAL; INTRAMUSCULAR; INTRAVENOUS; SOFT TISSUE at 06:12

## 2025-06-20 NOTE — PROGRESS NOTES
Chief complaint: Headaches    Admit Diagnosis:   Brain mass [G93.89]     Subjective: No events overnight. Patient is reporting increasing headaches and she has noticed an increase in her anxiety overnight, having trouble falling asleep. Otherwise, she is in good spirits. Her , Taras is at the bedside this morning. She is asking me if she can take a shower, which I have no issues with.     Objective:    Vitals:    25 0332   BP: 113/64   Pulse: 51   Resp: 18   Temp: 97.6 °F (36.4 °C)   SpO2: 91%     Pulse  Av.6  Min: 51  Max: 77  Systolic (24hrs), Av , Min:113 , Max:122     Diastolic (24hrs), Av, Min:64, Max:78    Temp (24hrs), Av.1 °F (36.7 °C), Min:97.6 °F (36.4 °C), Max:98.5 °F (36.9 °C)      Patient appears comfortable, resting, sitting up in bed.   Awake, alert and oriented x 3. Pleasant and conversant. Somewhat anxious.   Speech f/c  Opens eyes spontaneously  EOM intact bilaterally  Pupils 3mm reactive bilaterally  Face symmetric  Tongue midline  Gait not assessed      Lab Results   Component Value Date     2025       A/P:   Admit Diagnosis:   Brain mass [G93.89]     I am going to increase her Norco 5 to 7.5 to help with her headaches/head pressure. I am also going to increase her nightly melatonin from 5 mg to 10 mg to help with insomnia. I suspect that she is experiencing side effects from her steroids but I discussed the importance of remaining on them for 72 hours. We will plan on angiogram/embolization with Dr. Gu on Tuesday and surgery on Wednesday. I will need to get an MRI of the brain with and without with fiducials on Tuesday. Please reach out with further questions or concerns. I am on call this weekend and will continue daily visits.     Jaleesa Carlson PA-C

## 2025-06-20 NOTE — PROGRESS NOTES
[facility] Medicine Services  PROGRESS NOTE    Patient Name: Evelia Ryan  : 1975  MRN: [id number]    Date of Admission: 2025  Primary Care Physician: Nina Cheng APRN    Subjective   Subjective     CC:  Headache, brain mass    HPI:  Says she slept only 4 hours last night but only 2 hours the night before.  Continues to c/o h/a/ but it's better.  States that feels hyped up from the steroids, normally takes melatonin at home to help her sleep      Objective   Objective     Vital Signs:   Temp:  [97.6 °F (36.4 °C)-98.5 °F (36.9 °C)] 97.6 °F (36.4 °C)  Heart Rate:  [51-77] 51  Resp:  [18-20] 18  BP: (113-122)/(64-78) 113/64     Physical Exam:  Constitutional: No acute distress, awake, alert,  at bedside  HENT: NCAT, mucous membranes moist  Respiratory: Clear to auscultation bilaterally, respiratory effort normal   Cardiovascular: RRR, no murmurs, rubs, or gallops  Gastrointestinal: Positive bowel sounds, soft, nontender, nondistended  Musculoskeletal: No bilateral ankle edema  Psychiatric: Appropriate affect, cooperative  Neurologic: Oriented x 3, ROBB, speech clear  Skin: No rashes      Results Reviewed:  LAB RESULTS:      Lab 25  1116 25   WBC 5.20 6.60   HEMOGLOBIN 15.1 14.3   HEMATOCRIT 44.0 42.3   PLATELETS 267 252   NEUTROS ABS 4.66 5.00   IMMATURE GRANS (ABS) 0.04 0.02   LYMPHS ABS 0.45* 1.10   MONOS ABS 0.04* 0.37   EOS ABS 0.00 0.07   MCV 93.4 93.4         Lab 25  1116 25   SODIUM 137 141   POTASSIUM 3.9 3.6   CHLORIDE 100 102   CO2 24.0 27.0   ANION GAP 13.0 12.0   BUN 10.6 10.3   CREATININE 0.55* 0.60   EGFR 110.4 108.2   GLUCOSE 126* 94   CALCIUM 9.9 9.2         Lab 25  1116 25   TOTAL PROTEIN 7.9 6.8   ALBUMIN 4.3 4.2   GLOBULIN 3.6 2.6   ALT (SGPT) 15 14   AST (SGOT) 16 17   BILIRUBIN 0.5 0.4   ALK PHOS 77 67                     Brief Urine Lab Results       None            Microbiology Results  Abnormal       None            MRI Brain With & Without Contrast  Result Date: 6/18/2025  MRI BRAIN W WO CONTRAST Date of Exam: 6/18/2025 9:35 PM EDT Indication: Per mass found on CT outpatient today.  Comparison: Head CT 6/18/2025. Technique:  Routine multiplanar/multisequence sequence images of the brain were obtained before and after the uneventful administration of 19 mL Multihance. Findings: Left occipital convexity mass which appears mildly heterogenously T2 hyperintense, with mild restricted diffusion, T1 isointense, and mildly heterogenous enhancement, with foci of calcification most prominent along its inferomedial aspect. The mass measures 6.2 x 5 x 5 cm (CC by AP by TV) and there is suggestion of dural based attachment and surrounding CSF cleft. The mass abuts and likely invades the adjacent superior sagittal sinus which otherwise remains patent. There is minimal adjacent parenchymal edema. There is mass effect on the left occipital lobe with narrowing of the left lateral ventricle, without herniation or significant midline shift. No visible abnormal parenchymal enhancement. No acute midline shift, extra axial fluid collection, hydrocephalus, or infarct. No subacute to old hemorrhage. Appropriate flow voids at the skull base and in the major venous sinuses otherwise. Mild mucosal changes in the paranasal sinuses. Mastoid air cells are essentially clear. Visualized globes and orbits appear unremarkable by MRI. No acute or aggressive appearing bone or extracranial soft tissue process.     Impression: Impression: Left occipital convexity extra-axial appearing mass measuring up to 6.2 cm, most likely a partially calcified meningioma. The mass abuts and likely invades the adjacent superior sagittal sinus which otherwise remains patent. Mass effect on the left occipital lobe with narrowing of the left lateral ventricle and without significant herniation or midline shift. Electronically Signed: Greg Sotelo  MD  6/18/2025 10:26 PM EDT  Workstation ID: [id number]    CT Outside Head  Result Date: 6/18/2025  This procedure was auto-finalized with no dictation required.    CT Head Without Contrast  Result Date: 6/18/2025  EXAM: CT HEAD WO CONTRAST INDICATION: R51.9: Headache disorder; COMPARISON: None. TECHNIQUE: Axial CT imaging of the head. Axial images and multiplanar reformatted images reviewed.  Individualized dose optimization technique was used in order to meet ALARA standards for radiation dose reduction.  In addition to vendor specific dose reduction algorithms, the dose reduction techniques vary based on the specific scanner utilized but frequently include automated exposure control, adjustment of the mA and/or kV according to patient size, and use of iterative reconstruction technique. CONTRAST: None. FINDINGS: No acute intracranial hemorrhage. Approximately 4.8 x 4.4 x 5.9 cm mass in the left occipital lobe with associated calcification. Mild mass effect on the posterior falx and posterior horn left lateral ventricle. Mild surrounding edema. Gray-white differentiation normal. No acute calvarial abnormalities.  No orbital abnormality. Paranasal sinuses and mastoid air cells clear.    Impression: Approximately 5 cm mass with associated calcification in the posterior left occipital lobe. MRI with contrast is recommended for further characterization. Electronically signed by Kashmir Perez MD          Current medications:  Scheduled Meds:dexAMETHasone, 4 mg, Intravenous, Q6H  melatonin, 10 mg, Oral, Nightly  sodium chloride, 10 mL, Intravenous, Q12H      Continuous Infusions:   PRN Meds:.  acetaminophen **OR** acetaminophen **OR** acetaminophen    senna-docusate sodium **AND** polyethylene glycol **AND** bisacodyl **AND** bisacodyl    Morphine    ondansetron    [COMPLETED] Insert Peripheral IV **AND** sodium chloride    sodium chloride    sodium chloride    Assessment & Plan   Assessment & Plan     Active Hospital  "Problems    Diagnosis  POA    **Brain mass [G93.89]  Yes    Anxiety and depression [F41.9, F32.A]  Yes      Resolved Hospital Problems   No resolved problems to display.        Brief Hospital Course to date:  Evelia Ryan is a 50 y.o. female  that for the last few weeks she has noticed increasing frequency of global headaches, occasional dizziness, increased fatigue, bilateral hand pain, and especially vision changes. She states her vision has become more more blurry especially over the last couple of weeks. Her headache became worse over the last 24 hours, with home/over-the-counter agents not helping. She confirms nausea with the headaches as well but no emesis. She also states that she has sometimes noticed when lying in bed certain ways, she will have obvious increase in headache specifically in the occipital region. She states that she \"knew that was probably not good,\" so she went to see her PCP who ordered outpatient imaging, and she states she was called and told that this revealed a occipital brain mass, and that she was instructed to come to the ED      Brain Mass, likely Meningioma  - MRI confirms; Left occipital convexity extra-axial appearing mass measuring up to 6.2 cm, most likely a partially calcified meningioma. The mass abuts and likely invades the adjacent superior sagittal sinus which otherwise remains patent. Mass effect on the left occipital lobe with narrowing of the left lateral ventricle and without significant herniation or midline shift.  - continue Decadron  - neurosurgery following.  Recs 72 hours of high dose steroids.  Hold on keppra for now since no seizure like activity.  Plans to arrange angiogram/embolization next Tuesday with Dr. Gu to help map out blood supply to mengioma.  Also plans new MRI brain with and without with fiducials on Tuesday.  Plans for surgery with Dr. Mitchell next Wednesday.  Plan is to stay in hospital until surgery for high dose steroids  - neuro " checks  -NS increased lortab from 5mg to 7.5mg prn to help with headaches  - PT/OT     Anxiety  - prn ativan    Insomnia  --likely d/t steroids.  Add restoril PRN        Expected Discharge Location and Transportation: TBD  Expected Discharge   Expected Discharge Date: 6/27/2025; Expected Discharge Time:      VTE Prophylaxis:  Mechanical VTE prophylaxis orders are present.         AM-PAC 6 Clicks Score (PT): 24 (06/19/25 2000)    CODE STATUS:   Code Status and Medical Interventions: CPR (Attempt to Resuscitate); Full Support   Ordered at: 06/18/25 2348     Code Status (Patient has no pulse and is not breathing):    CPR (Attempt to Resuscitate)     Medical Interventions (Patient has pulse or is breathing):    Full Support     Level Of Support Discussed With:    Patient       Anup Santacruz MD  06/20/25

## 2025-06-20 NOTE — PLAN OF CARE
Problem: Adult Inpatient Plan of Care  Goal: Plan of Care Review  Outcome: Progressing  Flowsheets  Taken 6/20/2025 0555  Progress: no change  Taken 6/19/2025 0441  Plan of Care Reviewed With: patient  Goal: Patient-Specific Goal (Individualized)  Outcome: Progressing  Goal: Absence of Hospital-Acquired Illness or Injury  Outcome: Progressing  Intervention: Identify and Manage Fall Risk  Recent Flowsheet Documentation  Taken 6/20/2025 0400 by Annia Flores RN  Safety Promotion/Fall Prevention:   activity supervised   assistive device/personal items within reach   clutter free environment maintained   fall prevention program maintained   room organization consistent   safety round/check completed   nonskid shoes/slippers when out of bed  Taken 6/20/2025 0215 by Annia Flores RN  Safety Promotion/Fall Prevention:   assistive device/personal items within reach   clutter free environment maintained   fall prevention program maintained   room organization consistent   safety round/check completed   nonskid shoes/slippers when out of bed  Taken 6/20/2025 0000 by Flores, Annia S, RN  Safety Promotion/Fall Prevention:   activity supervised   assistive device/personal items within reach   clutter free environment maintained   fall prevention program maintained   room organization consistent   safety round/check completed   nonskid shoes/slippers when out of bed  Taken 6/19/2025 2201 by Annia Flores RN  Safety Promotion/Fall Prevention:   activity supervised   assistive device/personal items within reach   clutter free environment maintained   fall prevention program maintained   room organization consistent   safety round/check completed   nonskid shoes/slippers when out of bed  Taken 6/19/2025 2000 by Annia Flores RN  Safety Promotion/Fall Prevention:   activity supervised   assistive device/personal items within reach   clutter free environment maintained   fall prevention program  maintained   room organization consistent   safety round/check completed   nonskid shoes/slippers when out of bed  Intervention: Prevent Skin Injury  Recent Flowsheet Documentation  Taken 6/20/2025 0400 by Annia Flores RN  Body Position: position changed independently  Taken 6/20/2025 0215 by Annia Flores RN  Body Position: position changed independently  Taken 6/20/2025 0000 by Annia Flores RN  Body Position: position changed independently  Taken 6/19/2025 2201 by Annia Flores RN  Body Position: position changed independently  Taken 6/19/2025 2000 by Annia Flores RN  Body Position: position changed independently  Intervention: Prevent Infection  Recent Flowsheet Documentation  Taken 6/20/2025 0400 by Annia Flores RN  Infection Prevention: environmental surveillance performed  Taken 6/20/2025 0215 by Annia Flores RN  Infection Prevention: environmental surveillance performed  Taken 6/20/2025 0000 by Annia Flores RN  Infection Prevention: environmental surveillance performed  Taken 6/19/2025 2201 by Annia Flores RN  Infection Prevention: environmental surveillance performed  Taken 6/19/2025 2000 by Annia Flores RN  Infection Prevention: environmental surveillance performed  Goal: Optimal Comfort and Wellbeing  Outcome: Progressing  Intervention: Monitor Pain and Promote Comfort  Recent Flowsheet Documentation  Taken 6/20/2025 0300 by Annia Flores RN  Pain Management Interventions:   pain management plan reviewed with patient/caregiver   position adjusted   quiet environment facilitated  Taken 6/20/2025 0215 by Annia Flores RN  Pain Management Interventions: pain medication given  Taken 6/19/2025 2201 by Annia Flores RN  Pain Management Interventions:   pain medication given   pain management plan reviewed with patient/caregiver   quiet environment facilitated  Intervention: Provide Person-Centered Care  Recent  Flowsheet Documentation  Taken 6/19/2025 2000 by Annia Flores, RN  Trust Relationship/Rapport:   care explained   questions answered   questions encouraged   reassurance provided   thoughts/feelings acknowledged  Goal: Readiness for Transition of Care  Outcome: Progressing   Goal Outcome Evaluation:  Plan of Care Reviewed With: patient        Progress: no change

## 2025-06-20 NOTE — CASE MANAGEMENT/SOCIAL WORK
Continued Stay Note   Damari     Patient Name: Evelia Ryan  MRN: [id number]  Today's Date: 6/20/2025    Admit Date: 6/18/2025    Plan: Home   Discharge Plan       Row Name 06/20/25 1054       Plan    Plan Home    Patient/Family in Agreement with Plan yes    Plan Comments Discussed patient in MDR.  Patient to remain in hospital for surgery scheduled on Wednesday, 6/25.  Her goal is still to return home at discharge.  No needs noted at this time.  CM will continue to follow.    Final Discharge Disposition Code 01 - home or self-care                   Discharge Codes    No documentation.                 Expected Discharge Date and Time       Expected Discharge Date Expected Discharge Time    Jun 27, 2025               Graciela Douglass RN

## 2025-06-21 PROCEDURE — 25010000002 DEXAMETHASONE PER 1 MG: Performed by: PHYSICIAN ASSISTANT

## 2025-06-21 PROCEDURE — 25010000002 MORPHINE PER 10 MG: Performed by: INTERNAL MEDICINE

## 2025-06-21 PROCEDURE — 99232 SBSQ HOSP IP/OBS MODERATE 35: CPT | Performed by: INTERNAL MEDICINE

## 2025-06-21 RX ORDER — HYDROCODONE BITARTRATE AND ACETAMINOPHEN 7.5; 325 MG/1; MG/1
1 TABLET ORAL EVERY 4 HOURS PRN
Status: DISCONTINUED | OUTPATIENT
Start: 2025-06-21 | End: 2025-06-28 | Stop reason: HOSPADM

## 2025-06-21 RX ADMIN — Medication 10 ML: at 08:40

## 2025-06-21 RX ADMIN — MORPHINE SULFATE 2 MG: 2 INJECTION, SOLUTION INTRAMUSCULAR; INTRAVENOUS at 14:57

## 2025-06-21 RX ADMIN — Medication 10 MG: at 21:55

## 2025-06-21 RX ADMIN — DEXAMETHASONE SODIUM PHOSPHATE 4 MG: 4 INJECTION, SOLUTION INTRA-ARTICULAR; INTRALESIONAL; INTRAMUSCULAR; INTRAVENOUS; SOFT TISSUE at 00:01

## 2025-06-21 RX ADMIN — DEXAMETHASONE SODIUM PHOSPHATE 4 MG: 4 INJECTION, SOLUTION INTRA-ARTICULAR; INTRALESIONAL; INTRAMUSCULAR; INTRAVENOUS; SOFT TISSUE at 05:58

## 2025-06-21 RX ADMIN — DEXAMETHASONE SODIUM PHOSPHATE 4 MG: 4 INJECTION, SOLUTION INTRA-ARTICULAR; INTRALESIONAL; INTRAMUSCULAR; INTRAVENOUS; SOFT TISSUE at 17:55

## 2025-06-21 RX ADMIN — MORPHINE SULFATE 2 MG: 2 INJECTION, SOLUTION INTRAMUSCULAR; INTRAVENOUS at 08:49

## 2025-06-21 RX ADMIN — MORPHINE SULFATE 2 MG: 2 INJECTION, SOLUTION INTRAMUSCULAR; INTRAVENOUS at 01:57

## 2025-06-21 RX ADMIN — HYDROCODONE BITARTRATE AND ACETAMINOPHEN 1 TABLET: 7.5; 325 TABLET ORAL at 17:59

## 2025-06-21 RX ADMIN — HYDROCODONE BITARTRATE AND ACETAMINOPHEN 1 TABLET: 7.5; 325 TABLET ORAL at 04:02

## 2025-06-21 RX ADMIN — MORPHINE SULFATE 2 MG: 2 INJECTION, SOLUTION INTRAMUSCULAR; INTRAVENOUS at 22:00

## 2025-06-21 RX ADMIN — DEXAMETHASONE SODIUM PHOSPHATE 4 MG: 4 INJECTION, SOLUTION INTRA-ARTICULAR; INTRALESIONAL; INTRAMUSCULAR; INTRAVENOUS; SOFT TISSUE at 11:56

## 2025-06-21 RX ADMIN — HYDROCODONE BITARTRATE AND ACETAMINOPHEN 1 TABLET: 7.5; 325 TABLET ORAL at 00:01

## 2025-06-21 RX ADMIN — HYDROCODONE BITARTRATE AND ACETAMINOPHEN 1 TABLET: 7.5; 325 TABLET ORAL at 11:52

## 2025-06-21 NOTE — PROGRESS NOTES
Chief complaint: Headache    Admit Diagnosis:   Brain mass [G93.89]     Subjective: No events overnight.  Patient reports that she slept much better throughout the night since making adjustments to her medication yesterday.  Her  is at the bedside this morning.  She denies any new or worsening symptoms.    Objective:    Vitals:    25 0849   BP: 119/73   Pulse: 53   Resp: 14   Temp:    SpO2: 95%     Pulse  Av.7  Min: 48  Max: 66  Systolic (24hrs), Av , Min:113 , Max:126     Diastolic (24hrs), Av, Min:65, Max:89    Temp (24hrs), Av.8 °F (36.6 °C), Min:97.5 °F (36.4 °C), Max:97.9 °F (36.6 °C)      Patient is sitting up in bed, no acute distress.  Pleasant and conversant.  Speech fluent and intact.  Able to move all 4 extremities to command.    Lab Results   Component Value Date     2025       A/P:   Admit Diagnosis:   Brain mass [G93.89]     Continue Decadron and symptomatic control for her headaches/head pressure with as needed narcotics.  The tentative plan for now is that she will undergo angiogram/embolization for surgical planning with Dr. Gu on Tuesday.  She is on our schedule for craniotomy for resection of her occipital meningioma on Wednesday.  I am going to get an MRI of the brain with and without contrast on Tuesday with fiducials.  Please reach out with further questions or concerns.  All questions answered at the bedside with the patient and her .    Jaleesa Carlson PA-C

## 2025-06-21 NOTE — PLAN OF CARE
"Goal Outcome Evaluation:  Plan of Care Reviewed With: patient        Progress: no change  Outcome Evaluation: Pt remains AOx4, RA, NSR, with no neuro changes. Experiences 8/10 pain in head, described as \"constant pressure,\" with no relief from consistent doses of pain medication. States that temazepam was effective in helping her get a few hours of sleep.                             "

## 2025-06-21 NOTE — PROGRESS NOTES
[facility] Medicine Services  PROGRESS NOTE    Patient Name: Evelia Ryan  : 1975  MRN: [id number]    Date of Admission: 2025  Primary Care Physician: Nina Cheng APRN    Subjective   Subjective     CC:  Headache, brain mass    HPI:  Slept a little better last night.  Still hyper from the steroids but doing ok.        Objective   Objective     Vital Signs:   Temp:  [97.5 °F (36.4 °C)-97.9 °F (36.6 °C)] 97.6 °F (36.4 °C)  Heart Rate:  [48-66] 58  Resp:  [14-18] 16  BP: (113-126)/(65-75) 122/75     Physical Exam:  Constitutional: No acute distress, awake, alert,  at bedside  HENT: NCAT, mucous membranes moist  Respiratory: Clear to auscultation bilaterally, respiratory effort normal   Cardiovascular: RRR, no murmurs, rubs, or gallops  Gastrointestinal: Positive bowel sounds, soft, nontender, nondistended  Musculoskeletal: No bilateral ankle edema  Psychiatric: Appropriate affect, cooperative  Neurologic: Oriented x 3, ROBB, speech clear  Skin: No rashes      Results Reviewed:  LAB RESULTS:      Lab 25  1116 25   WBC 5.20 6.60   HEMOGLOBIN 15.1 14.3   HEMATOCRIT 44.0 42.3   PLATELETS 267 252   NEUTROS ABS 4.66 5.00   IMMATURE GRANS (ABS) 0.04 0.02   LYMPHS ABS 0.45* 1.10   MONOS ABS 0.04* 0.37   EOS ABS 0.00 0.07   MCV 93.4 93.4         Lab 25  1116 25   SODIUM 137 141   POTASSIUM 3.9 3.6   CHLORIDE 100 102   CO2 24.0 27.0   ANION GAP 13.0 12.0   BUN 10.6 10.3   CREATININE 0.55* 0.60   EGFR 110.4 108.2   GLUCOSE 126* 94   CALCIUM 9.9 9.2         Lab 25  1116 25   TOTAL PROTEIN 7.9 6.8   ALBUMIN 4.3 4.2   GLOBULIN 3.6 2.6   ALT (SGPT) 15 14   AST (SGOT) 16 17   BILIRUBIN 0.5 0.4   ALK PHOS 77 67                     Brief Urine Lab Results       None            Microbiology Results Abnormal       None            No radiology results from the last 24 hrs          Current medications:  Scheduled Meds:dexAMETHasone,  "4 mg, Intravenous, Q6H  melatonin, 10 mg, Oral, Nightly  sodium chloride, 10 mL, Intravenous, Q12H      Continuous Infusions:   PRN Meds:.  acetaminophen **OR** acetaminophen **OR** acetaminophen    senna-docusate sodium **AND** polyethylene glycol **AND** bisacodyl **AND** bisacodyl    HYDROcodone-acetaminophen    Morphine    ondansetron    [COMPLETED] Insert Peripheral IV **AND** sodium chloride    sodium chloride    sodium chloride    temazepam    Assessment & Plan   Assessment & Plan     Active Hospital Problems    Diagnosis  POA    **Brain mass [G93.89]  Yes    Anxiety and depression [F41.9, F32.A]  Yes      Resolved Hospital Problems   No resolved problems to display.        Brief Hospital Course to date:  Evelia Ryan is a 50 y.o. female  that for the last few weeks she has noticed increasing frequency of global headaches, occasional dizziness, increased fatigue, bilateral hand pain, and especially vision changes. She states her vision has become more more blurry especially over the last couple of weeks. Her headache became worse over the last 24 hours, with home/over-the-counter agents not helping. She confirms nausea with the headaches as well but no emesis. She also states that she has sometimes noticed when lying in bed certain ways, she will have obvious increase in headache specifically in the occipital region. She states that she \"knew that was probably not good,\" so she went to see her PCP who ordered outpatient imaging, and she states she was called and told that this revealed a occipital brain mass, and that she was instructed to come to the ED      Brain Mass, likely Meningioma  - MRI confirms; Left occipital convexity extra-axial appearing mass measuring up to 6.2 cm, most likely a partially calcified meningioma. The mass abuts and likely invades the adjacent superior sagittal sinus which otherwise remains patent. Mass effect on the left occipital lobe with narrowing of the left lateral " ventricle and without significant herniation or midline shift.  - continue Decadron  - neurosurgery following.  Recs 72 hours of high dose steroids.  Hold on keppra for now since no seizure like activity.  Plans to arrange angiogram/embolization next Tuesday with Dr. Gu to help map out blood supply to mengioma.  Also plans new MRI brain with and without with fiducials on Tuesday.  Plans for surgery with Dr. Mitchell next Wednesday.  Plan is to stay in hospital until surgery for high dose steroids  - neuro checks  -NS increased lortab from 5mg to 7.5mg prn to help with headaches  - PT/OT     Anxiety  - prn ativan    Insomnia  --likely d/t steroids.  Added restoril PRN        Expected Discharge Location and Transportation: TBD  Expected Discharge   Expected Discharge Date: 6/27/2025; Expected Discharge Time:      VTE Prophylaxis:  Mechanical VTE prophylaxis orders are present.         AM-PAC 6 Clicks Score (PT): 24 (06/21/25 0800)    CODE STATUS:   Code Status and Medical Interventions: CPR (Attempt to Resuscitate); Full Support   Ordered at: 06/18/25 8684     Code Status (Patient has no pulse and is not breathing):    CPR (Attempt to Resuscitate)     Medical Interventions (Patient has pulse or is breathing):    Full Support     Level Of Support Discussed With:    Patient       Anup Santacruz MD  06/21/25

## 2025-06-21 NOTE — PLAN OF CARE
"  Problem: Adult Inpatient Plan of Care  Goal: Plan of Care Review  Outcome: Progressing  Flowsheets  Taken 6/21/2025 0451  Progress: no change  Outcome Evaluation: Pt remains AOx4, RA, NSR, with no neuro changes. Experiences 8/10 pain in head, described as \"constant pressure,\" with no relief from consistent doses of pain medication. States that temazepam was effective in helping her get a few hours of sleep.  Taken 6/19/2025 0441  Plan of Care Reviewed With: patient  Goal: Patient-Specific Goal (Individualized)  Outcome: Progressing  Goal: Absence of Hospital-Acquired Illness or Injury  Outcome: Progressing  Intervention: Identify and Manage Fall Risk  Recent Flowsheet Documentation  Taken 6/21/2025 0400 by Annia Flores RN  Safety Promotion/Fall Prevention:   activity supervised   assistive device/personal items within reach   clutter free environment maintained   fall prevention program maintained   room organization consistent   safety round/check completed  Taken 6/21/2025 0200 by Annia Flores RN  Safety Promotion/Fall Prevention:   activity supervised   assistive device/personal items within reach   clutter free environment maintained   fall prevention program maintained   room organization consistent   safety round/check completed  Taken 6/21/2025 0001 by Annia Flores RN  Safety Promotion/Fall Prevention:   activity supervised   assistive device/personal items within reach   clutter free environment maintained   fall prevention program maintained   room organization consistent   safety round/check completed  Taken 6/20/2025 2200 by Annia Flores RN  Safety Promotion/Fall Prevention:   activity supervised   assistive device/personal items within reach   clutter free environment maintained   fall prevention program maintained   room organization consistent   safety round/check completed  Taken 6/20/2025 2000 by Annia Flores RN  Safety Promotion/Fall Prevention:   activity " supervised   assistive device/personal items within reach   clutter free environment maintained   fall prevention program maintained   room organization consistent   safety round/check completed   nonskid shoes/slippers when out of bed  Intervention: Prevent Skin Injury  Recent Flowsheet Documentation  Taken 6/21/2025 0400 by Annia Flores RN  Body Position: position changed independently  Taken 6/21/2025 0200 by Annia Flores RN  Body Position: position changed independently  Taken 6/21/2025 0001 by Annia Flores RN  Body Position: position changed independently  Taken 6/20/2025 2200 by Annia Flores RN  Body Position: position changed independently  Taken 6/20/2025 2000 by Annia Flores RN  Body Position: position changed independently  Intervention: Prevent and Manage VTE (Venous Thromboembolism) Risk  Recent Flowsheet Documentation  Taken 6/20/2025 2200 by Annia Flores RN  VTE Prevention/Management:   SCDs (sequential compression devices) off   patient refused intervention  Intervention: Prevent Infection  Recent Flowsheet Documentation  Taken 6/21/2025 0400 by Annia Flores RN  Infection Prevention: environmental surveillance performed  Taken 6/21/2025 0200 by Annia Flores RN  Infection Prevention: environmental surveillance performed  Taken 6/21/2025 0001 by Annia Flores RN  Infection Prevention: environmental surveillance performed  Taken 6/20/2025 2200 by Annia Flores RN  Infection Prevention: environmental surveillance performed  Taken 6/20/2025 2000 by Annia Flores RN  Infection Prevention: environmental surveillance performed  Goal: Optimal Comfort and Wellbeing  Outcome: Progressing  Intervention: Monitor Pain and Promote Comfort  Recent Flowsheet Documentation  Taken 6/21/2025 0200 by Annia Flores RN  Pain Management Interventions: pain management plan reviewed with patient/caregiver  Taken 6/21/2025 0157 by  "Annia Flores RN  Pain Management Interventions: pain medication given  Taken 6/21/2025 0031 by Annia Flores RN  Pain Management Interventions: pain management plan reviewed with patient/caregiver  Taken 6/21/2025 0001 by Annia Flores RN  Pain Management Interventions: pain medication given  Taken 6/20/2025 2000 by Annia Flores RN  Pain Management Interventions:   pain management plan reviewed with patient/caregiver   position adjusted   quiet environment facilitated  Intervention: Provide Person-Centered Care  Recent Flowsheet Documentation  Taken 6/21/2025 0200 by Annia Flores RN  Trust Relationship/Rapport:   care explained   reassurance provided   emotional support provided  Taken 6/20/2025 2000 by Annia Flores RN  Trust Relationship/Rapport:   care explained   questions answered   questions encouraged   reassurance provided   thoughts/feelings acknowledged  Goal: Readiness for Transition of Care  Outcome: Progressing   Goal Outcome Evaluation:  Plan of Care Reviewed With: patient        Progress: no change  Outcome Evaluation: Pt remains AOx4, RA, NSR, with no neuro changes. Experiences 8/10 pain in head, described as \"constant pressure,\" with no relief from consistent doses of pain medication. States that temazepam was effective in helping her get a few hours of sleep.                             "

## 2025-06-22 PROCEDURE — 25010000002 MORPHINE PER 10 MG: Performed by: INTERNAL MEDICINE

## 2025-06-22 PROCEDURE — 25010000002 DEXAMETHASONE PER 1 MG: Performed by: PHYSICIAN ASSISTANT

## 2025-06-22 PROCEDURE — 99232 SBSQ HOSP IP/OBS MODERATE 35: CPT | Performed by: INTERNAL MEDICINE

## 2025-06-22 RX ADMIN — DEXAMETHASONE SODIUM PHOSPHATE 4 MG: 4 INJECTION, SOLUTION INTRA-ARTICULAR; INTRALESIONAL; INTRAMUSCULAR; INTRAVENOUS; SOFT TISSUE at 12:08

## 2025-06-22 RX ADMIN — Medication 10 MG: at 19:14

## 2025-06-22 RX ADMIN — Medication 10 ML: at 08:06

## 2025-06-22 RX ADMIN — HYDROCODONE BITARTRATE AND ACETAMINOPHEN 1 TABLET: 7.5; 325 TABLET ORAL at 12:08

## 2025-06-22 RX ADMIN — DEXAMETHASONE SODIUM PHOSPHATE 4 MG: 4 INJECTION, SOLUTION INTRA-ARTICULAR; INTRALESIONAL; INTRAMUSCULAR; INTRAVENOUS; SOFT TISSUE at 22:58

## 2025-06-22 RX ADMIN — MORPHINE SULFATE 2 MG: 2 INJECTION, SOLUTION INTRAMUSCULAR; INTRAVENOUS at 19:14

## 2025-06-22 RX ADMIN — DEXAMETHASONE SODIUM PHOSPHATE 4 MG: 4 INJECTION, SOLUTION INTRA-ARTICULAR; INTRALESIONAL; INTRAMUSCULAR; INTRAVENOUS; SOFT TISSUE at 05:09

## 2025-06-22 RX ADMIN — HYDROCODONE BITARTRATE AND ACETAMINOPHEN 1 TABLET: 7.5; 325 TABLET ORAL at 00:30

## 2025-06-22 RX ADMIN — HYDROCODONE BITARTRATE AND ACETAMINOPHEN 1 TABLET: 7.5; 325 TABLET ORAL at 07:30

## 2025-06-22 RX ADMIN — MORPHINE SULFATE 2 MG: 2 INJECTION, SOLUTION INTRAMUSCULAR; INTRAVENOUS at 14:27

## 2025-06-22 RX ADMIN — MORPHINE SULFATE 2 MG: 2 INJECTION, SOLUTION INTRAMUSCULAR; INTRAVENOUS at 04:43

## 2025-06-22 RX ADMIN — DEXAMETHASONE SODIUM PHOSPHATE 4 MG: 4 INJECTION, SOLUTION INTRA-ARTICULAR; INTRALESIONAL; INTRAMUSCULAR; INTRAVENOUS; SOFT TISSUE at 17:04

## 2025-06-22 RX ADMIN — TEMAZEPAM 15 MG: 15 CAPSULE ORAL at 20:10

## 2025-06-22 RX ADMIN — Medication 10 ML: at 19:14

## 2025-06-22 RX ADMIN — HYDROCODONE BITARTRATE AND ACETAMINOPHEN 1 TABLET: 7.5; 325 TABLET ORAL at 17:04

## 2025-06-22 RX ADMIN — DEXAMETHASONE SODIUM PHOSPHATE 4 MG: 4 INJECTION, SOLUTION INTRA-ARTICULAR; INTRALESIONAL; INTRAMUSCULAR; INTRAVENOUS; SOFT TISSUE at 00:31

## 2025-06-22 RX ADMIN — MORPHINE SULFATE 2 MG: 2 INJECTION, SOLUTION INTRAMUSCULAR; INTRAVENOUS at 09:44

## 2025-06-22 RX ADMIN — MORPHINE SULFATE 2 MG: 2 INJECTION, SOLUTION INTRAMUSCULAR; INTRAVENOUS at 22:58

## 2025-06-22 RX ADMIN — HYDROCODONE BITARTRATE AND ACETAMINOPHEN 1 TABLET: 7.5; 325 TABLET ORAL at 20:56

## 2025-06-22 RX ADMIN — TEMAZEPAM 15 MG: 15 CAPSULE ORAL at 00:30

## 2025-06-22 NOTE — PLAN OF CARE
Problem: Adult Inpatient Plan of Care  Goal: Plan of Care Review  Outcome: Progressing  Flowsheets (Taken 6/22/2025 1639)  Progress: improving  Outcome Evaluation: Reviewed POC with pt at bedside.  Uneventful shift.  Plan of Care Reviewed With: patient  Goal: Patient-Specific Goal (Individualized)  Outcome: Progressing  Goal: Absence of Hospital-Acquired Illness or Injury  Outcome: Progressing  Intervention: Identify and Manage Fall Risk  Recent Flowsheet Documentation  Taken 6/22/2025 1600 by Allie Bermudez RN  Safety Promotion/Fall Prevention:   fall prevention program maintained   safety round/check completed  Taken 6/22/2025 1400 by Allie Bermudez RN  Safety Promotion/Fall Prevention:   fall prevention program maintained   safety round/check completed  Taken 6/22/2025 1200 by Allie Bermudez RN  Safety Promotion/Fall Prevention:   fall prevention program maintained   safety round/check completed  Taken 6/22/2025 1000 by Allie Bermudez RN  Safety Promotion/Fall Prevention:   fall prevention program maintained   safety round/check completed  Taken 6/22/2025 0800 by Allei Bermudez RN  Safety Promotion/Fall Prevention:   fall prevention program maintained   safety round/check completed  Intervention: Prevent Skin Injury  Recent Flowsheet Documentation  Taken 6/22/2025 1600 by Allie Bermudez RN  Body Position: position changed independently  Taken 6/22/2025 1400 by Allie Bermudez RN  Body Position: position changed independently  Taken 6/22/2025 1200 by Allie Bermudez RN  Body Position: position changed independently  Taken 6/22/2025 1000 by Allie Bermudez RN  Body Position: position changed independently  Taken 6/22/2025 0800 by Allie Bermudez RN  Body Position: (Pt is self turn) position changed independently  Intervention: Prevent and Manage VTE (Venous Thromboembolism) Risk  Recent Flowsheet Documentation  Taken 6/22/2025 0800 by Allie Bermudez RN  VTE Prevention/Management:   SCDs (sequential  compression devices) off   patient refused intervention  Intervention: Prevent Infection  Recent Flowsheet Documentation  Taken 6/22/2025 1600 by Allie Bermudez RN  Infection Prevention: environmental surveillance performed  Taken 6/22/2025 1400 by Allie Bermudez RN  Infection Prevention: environmental surveillance performed  Taken 6/22/2025 1200 by Allie Bermudez RN  Infection Prevention: environmental surveillance performed  Taken 6/22/2025 1000 by Allie Bermudez RN  Infection Prevention: environmental surveillance performed  Taken 6/22/2025 0800 by Alile Bermudez RN  Infection Prevention: environmental surveillance performed  Goal: Optimal Comfort and Wellbeing  Outcome: Progressing  Intervention: Monitor Pain and Promote Comfort  Recent Flowsheet Documentation  Taken 6/22/2025 1208 by Allie Bermudez RN  Pain Management Interventions: pain medication given  Taken 6/22/2025 0944 by Allie Bermudez RN  Pain Management Interventions: pain medication given  Goal: Readiness for Transition of Care  Outcome: Progressing   Goal Outcome Evaluation:  Plan of Care Reviewed With: patient        Progress: improving  Outcome Evaluation: Reviewed POC with pt at bedside.  Uneventful shift.

## 2025-06-22 NOTE — PLAN OF CARE
Problem: Adult Inpatient Plan of Care  Goal: Plan of Care Review  6/22/2025 1642 by Allie Bermudez RN  Outcome: Not Progressing  Flowsheets (Taken 6/22/2025 1642)  Progress: no change  Outcome Evaluation: Reviewed POC w/ pt and spouse at bedside throughout shift.  Pain control remains priority for patient.  Indpendent with all ADLs.  Plan of Care Reviewed With:   patient   spouse  6/22/2025 1639 by Allie Bermudez RN  Outcome: Progressing  Flowsheets (Taken 6/22/2025 1639)  Progress: improving  Outcome Evaluation: Reviewed POC with pt at bedside.  Uneventful shift.  Plan of Care Reviewed With: patient  Goal: Patient-Specific Goal (Individualized)  6/22/2025 1642 by Allie Bermudez RN  Outcome: Not Progressing  6/22/2025 1639 by Allie Bermudez RN  Outcome: Progressing  Goal: Absence of Hospital-Acquired Illness or Injury  6/22/2025 1642 by Allie Bermudez RN  Outcome: Not Progressing  6/22/2025 1639 by Allie Bermudez RN  Outcome: Progressing  Intervention: Identify and Manage Fall Risk  Recent Flowsheet Documentation  Taken 6/22/2025 1600 by Allie Bermudez RN  Safety Promotion/Fall Prevention:   fall prevention program maintained   safety round/check completed  Taken 6/22/2025 1400 by Allie Bermudez RN  Safety Promotion/Fall Prevention:   fall prevention program maintained   safety round/check completed  Taken 6/22/2025 1200 by Allie Bermudez RN  Safety Promotion/Fall Prevention:   fall prevention program maintained   safety round/check completed  Taken 6/22/2025 1000 by Allie Bermudez RN  Safety Promotion/Fall Prevention:   fall prevention program maintained   safety round/check completed  Taken 6/22/2025 0800 by Allie Bermudez RN  Safety Promotion/Fall Prevention:   fall prevention program maintained   safety round/check completed  Intervention: Prevent Skin Injury  Recent Flowsheet Documentation  Taken 6/22/2025 1600 by Allie Bermudez RN  Body Position: position changed independently  Taken 6/22/2025  1400 by Allie Bermudez RN  Body Position: position changed independently  Taken 6/22/2025 1200 by Allie Bermudez RN  Body Position: position changed independently  Taken 6/22/2025 1000 by Allie Bermudez RN  Body Position: position changed independently  Taken 6/22/2025 0800 by Allie Bermudez RN  Body Position: (Pt is self turn) position changed independently  Intervention: Prevent and Manage VTE (Venous Thromboembolism) Risk  Recent Flowsheet Documentation  Taken 6/22/2025 0800 by Allie Bermudez RN  VTE Prevention/Management:   SCDs (sequential compression devices) off   patient refused intervention  Intervention: Prevent Infection  Recent Flowsheet Documentation  Taken 6/22/2025 1600 by Allie Bermudez RN  Infection Prevention: environmental surveillance performed  Taken 6/22/2025 1400 by Allie Bermudez RN  Infection Prevention: environmental surveillance performed  Taken 6/22/2025 1200 by Allie Bermudez RN  Infection Prevention: environmental surveillance performed  Taken 6/22/2025 1000 by Allie Bermudez RN  Infection Prevention: environmental surveillance performed  Taken 6/22/2025 0800 by Allie Bermudez RN  Infection Prevention: environmental surveillance performed  Goal: Optimal Comfort and Wellbeing  6/22/2025 1642 by Allie Bermudez RN  Outcome: Not Progressing  6/22/2025 1639 by Allie Bermudez RN  Outcome: Progressing  Intervention: Monitor Pain and Promote Comfort  Recent Flowsheet Documentation  Taken 6/22/2025 1208 by Allie Bermudez RN  Pain Management Interventions: pain medication given  Taken 6/22/2025 0944 by Allie Bermudez RN  Pain Management Interventions: pain medication given  Goal: Readiness for Transition of Care  6/22/2025 1642 by Allie Bermudez RN  Outcome: Not Progressing  6/22/2025 1639 by Allie Bermudez RN  Outcome: Progressing   Goal Outcome Evaluation:  Plan of Care Reviewed With: patient, spouse        Progress: no change  Outcome Evaluation: Reviewed POC w/ pt and spouse  at bedside throughout shift.  Pain control remains priority for patient.  Indpendent with all ADLs.

## 2025-06-22 NOTE — PROGRESS NOTES
Chief complaint: Brain mass, clinically significant cerebral edema    Admit Diagnosis:   Brain mass [G93.89]     Subjective: No events overnight    Objective:    Vitals:    25 0800   BP: 126/78   Pulse: (!) 46   Resp: 16   Temp: 97.6 °F (36.4 °C)   SpO2: 93%     Pulse  Av.2  Min: 46  Max: 58  Systolic (24hrs), Av , Min:120 , Max:130     Diastolic (24hrs), Av, Min:71, Max:78    Temp (24hrs), Av.8 °F (36.6 °C), Min:97.6 °F (36.4 °C), Max:98.1 °F (36.7 °C)      She is sitting up in bed.  She is alert, pleasant, conversant, and appropriate.  Her  is at the bedside.    Lab Results   Component Value Date     2025       A/P:   Admit Diagnosis:   Brain mass [G93.89]     Informed consent was once again obtained for diagnostic cerebral angiogram with possible embolization, and craniotomy on Wednesday.  All questions were answered.  No guarantees were given or implied.  The patient consents to proceed with surgery.

## 2025-06-22 NOTE — PROGRESS NOTES
[facility] Medicine Services  PROGRESS NOTE    Patient Name: Evelia Ryan  : 1975  MRN: [id number]    Date of Admission: 2025  Primary Care Physician: Nina Cheng APRN    Subjective   Subjective     CC:  Headache, brain mass    HPI:  Saw patient this AM.  Says that still has h/a but feels ok.        Objective   Objective     Vital Signs:   Temp:  [97.6 °F (36.4 °C)-98.1 °F (36.7 °C)] 97.7 °F (36.5 °C)  Heart Rate:  [45-55] 45  Resp:  [14-18] 14  BP: (116-130)/(71-78) 116/73     Physical Exam:  Constitutional: No acute distress, awake, alert,  at bedside  HENT: NCAT, mucous membranes moist  Respiratory: Clear to auscultation bilaterally, respiratory effort normal   Cardiovascular: RRR, no murmurs, rubs, or gallops  Gastrointestinal: Positive bowel sounds, soft, nontender, nondistended  Musculoskeletal: No bilateral ankle edema  Psychiatric: Appropriate affect, cooperative  Neurologic: Oriented x 3, ROBB, speech clear  Skin: No rashes      Results Reviewed:  LAB RESULTS:      Lab 25  1116 25   WBC 5.20 6.60   HEMOGLOBIN 15.1 14.3   HEMATOCRIT 44.0 42.3   PLATELETS 267 252   NEUTROS ABS 4.66 5.00   IMMATURE GRANS (ABS) 0.04 0.02   LYMPHS ABS 0.45* 1.10   MONOS ABS 0.04* 0.37   EOS ABS 0.00 0.07   MCV 93.4 93.4         Lab 25  11125   SODIUM 137 141   POTASSIUM 3.9 3.6   CHLORIDE 100 102   CO2 24.0 27.0   ANION GAP 13.0 12.0   BUN 10.6 10.3   CREATININE 0.55* 0.60   EGFR 110.4 108.2   GLUCOSE 126* 94   CALCIUM 9.9 9.2         Lab 25  11125   TOTAL PROTEIN 7.9 6.8   ALBUMIN 4.3 4.2   GLOBULIN 3.6 2.6   ALT (SGPT) 15 14   AST (SGOT) 16 17   BILIRUBIN 0.5 0.4   ALK PHOS 77 67                     Brief Urine Lab Results       None            Microbiology Results Abnormal       None            No radiology results from the last 24 hrs          Current medications:  Scheduled Meds:dexAMETHasone, 4 mg, Intravenous,  "Q6H  melatonin, 10 mg, Oral, Nightly  sodium chloride, 10 mL, Intravenous, Q12H      Continuous Infusions:   PRN Meds:.  acetaminophen **OR** acetaminophen **OR** acetaminophen    senna-docusate sodium **AND** polyethylene glycol **AND** bisacodyl **AND** bisacodyl    HYDROcodone-acetaminophen    Morphine    ondansetron    [COMPLETED] Insert Peripheral IV **AND** sodium chloride    sodium chloride    sodium chloride    temazepam    Assessment & Plan   Assessment & Plan     Active Hospital Problems    Diagnosis  POA    **Brain mass [G93.89]  Yes    Anxiety and depression [F41.9, F32.A]  Yes      Resolved Hospital Problems   No resolved problems to display.        Brief Hospital Course to date:  Evelia Ryan is a 50 y.o. female  that for the last few weeks she has noticed increasing frequency of global headaches, occasional dizziness, increased fatigue, bilateral hand pain, and especially vision changes. She states her vision has become more more blurry especially over the last couple of weeks. Her headache became worse over the last 24 hours, with home/over-the-counter agents not helping. She confirms nausea with the headaches as well but no emesis. She also states that she has sometimes noticed when lying in bed certain ways, she will have obvious increase in headache specifically in the occipital region. She states that she \"knew that was probably not good,\" so she went to see her PCP who ordered outpatient imaging, and she states she was called and told that this revealed a occipital brain mass, and that she was instructed to come to the ED      Brain Mass, likely Meningioma  - MRI confirms; Left occipital convexity extra-axial appearing mass measuring up to 6.2 cm, most likely a partially calcified meningioma. The mass abuts and likely invades the adjacent superior sagittal sinus which otherwise remains patent. Mass effect on the left occipital lobe with narrowing of the left lateral ventricle and without " significant herniation or midline shift.  - continue Decadron  - neurosurgery following.  Recs 72 hours of high dose steroids.  Hold on keppra for now since no seizure like activity.  Plans to arrange angiogram/embolization next Tuesday with Dr. Gu to help map out blood supply to mengioma.  Also plans new MRI brain with and without with fiducials on Tuesday.  Plans for surgery with Dr. Mitchell next Wednesday.  Plan is to stay in hospital until surgery for high dose steroids  - neuro checks  -NS increased lortab from 5mg to 7.5mg prn to help with headaches  - PT/OT     Anxiety  - prn ativan    Insomnia  --likely d/t steroids.  Added restoril PRN        Expected Discharge Location and Transportation: TBD  Expected Discharge   Expected Discharge Date: 6/27/2025; Expected Discharge Time:      VTE Prophylaxis:  Mechanical VTE prophylaxis orders are present.         AM-PAC 6 Clicks Score (PT): 24 (06/22/25 0800)    CODE STATUS:   Code Status and Medical Interventions: CPR (Attempt to Resuscitate); Full Support   Ordered at: 06/18/25 5860     Code Status (Patient has no pulse and is not breathing):    CPR (Attempt to Resuscitate)     Medical Interventions (Patient has pulse or is breathing):    Full Support     Level Of Support Discussed With:    Patient       Anup Santacruz MD  06/22/25

## 2025-06-23 PROCEDURE — 99232 SBSQ HOSP IP/OBS MODERATE 35: CPT | Performed by: INTERNAL MEDICINE

## 2025-06-23 PROCEDURE — 25010000002 DEXAMETHASONE PER 1 MG: Performed by: PHYSICIAN ASSISTANT

## 2025-06-23 PROCEDURE — 25010000002 MORPHINE PER 10 MG: Performed by: INTERNAL MEDICINE

## 2025-06-23 RX ORDER — TEMAZEPAM 15 MG/1
30 CAPSULE ORAL NIGHTLY PRN
Status: DISPENSED | OUTPATIENT
Start: 2025-06-23 | End: 2025-06-25

## 2025-06-23 RX ADMIN — MORPHINE SULFATE 2 MG: 2 INJECTION, SOLUTION INTRAMUSCULAR; INTRAVENOUS at 15:10

## 2025-06-23 RX ADMIN — DEXAMETHASONE SODIUM PHOSPHATE 4 MG: 4 INJECTION, SOLUTION INTRA-ARTICULAR; INTRALESIONAL; INTRAMUSCULAR; INTRAVENOUS; SOFT TISSUE at 11:02

## 2025-06-23 RX ADMIN — MORPHINE SULFATE 2 MG: 2 INJECTION, SOLUTION INTRAMUSCULAR; INTRAVENOUS at 06:42

## 2025-06-23 RX ADMIN — Medication 10 ML: at 09:24

## 2025-06-23 RX ADMIN — BISACODYL 10 MG: 10 SUPPOSITORY RECTAL at 21:12

## 2025-06-23 RX ADMIN — HYDROCODONE BITARTRATE AND ACETAMINOPHEN 1 TABLET: 7.5; 325 TABLET ORAL at 22:29

## 2025-06-23 RX ADMIN — HYDROCODONE BITARTRATE AND ACETAMINOPHEN 1 TABLET: 7.5; 325 TABLET ORAL at 09:23

## 2025-06-23 RX ADMIN — MORPHINE SULFATE 2 MG: 2 INJECTION, SOLUTION INTRAMUSCULAR; INTRAVENOUS at 03:10

## 2025-06-23 RX ADMIN — DEXAMETHASONE SODIUM PHOSPHATE 4 MG: 4 INJECTION, SOLUTION INTRA-ARTICULAR; INTRALESIONAL; INTRAMUSCULAR; INTRAVENOUS; SOFT TISSUE at 05:01

## 2025-06-23 RX ADMIN — POLYETHYLENE GLYCOL 3350 17 G: 17 POWDER, FOR SOLUTION ORAL at 11:02

## 2025-06-23 RX ADMIN — BISACODYL 5 MG: 5 TABLET, COATED ORAL at 16:15

## 2025-06-23 RX ADMIN — HYDROCODONE BITARTRATE AND ACETAMINOPHEN 1 TABLET: 7.5; 325 TABLET ORAL at 17:34

## 2025-06-23 RX ADMIN — MORPHINE SULFATE 2 MG: 2 INJECTION, SOLUTION INTRAMUSCULAR; INTRAVENOUS at 11:02

## 2025-06-23 RX ADMIN — HYDROCODONE BITARTRATE AND ACETAMINOPHEN 1 TABLET: 7.5; 325 TABLET ORAL at 05:01

## 2025-06-23 RX ADMIN — HYDROCODONE BITARTRATE AND ACETAMINOPHEN 1 TABLET: 7.5; 325 TABLET ORAL at 13:31

## 2025-06-23 RX ADMIN — MORPHINE SULFATE 2 MG: 2 INJECTION, SOLUTION INTRAMUSCULAR; INTRAVENOUS at 19:44

## 2025-06-23 RX ADMIN — DOCUSATE SODIUM 50 MG AND SENNOSIDES 8.6 MG 2 TABLET: 8.6; 5 TABLET, FILM COATED ORAL at 05:59

## 2025-06-23 RX ADMIN — DEXAMETHASONE SODIUM PHOSPHATE 4 MG: 4 INJECTION, SOLUTION INTRA-ARTICULAR; INTRALESIONAL; INTRAMUSCULAR; INTRAVENOUS; SOFT TISSUE at 17:34

## 2025-06-23 RX ADMIN — Medication 10 ML: at 21:13

## 2025-06-23 RX ADMIN — TEMAZEPAM 30 MG: 15 CAPSULE ORAL at 22:28

## 2025-06-23 RX ADMIN — HYDROCODONE BITARTRATE AND ACETAMINOPHEN 1 TABLET: 7.5; 325 TABLET ORAL at 01:03

## 2025-06-23 RX ADMIN — Medication 10 MG: at 22:27

## 2025-06-23 NOTE — PLAN OF CARE
Problem: Adult Inpatient Plan of Care  Goal: Plan of Care Review  Outcome: Progressing  Goal: Patient-Specific Goal (Individualized)  Outcome: Progressing  Goal: Absence of Hospital-Acquired Illness or Injury  Outcome: Progressing  Intervention: Identify and Manage Fall Risk  Recent Flowsheet Documentation  Taken 6/23/2025 0000 by Norman Myers RN  Safety Promotion/Fall Prevention:   assistive device/personal items within reach   clutter free environment maintained   fall prevention program maintained   lighting adjusted   nonskid shoes/slippers when out of bed   room organization consistent   safety round/check completed   toileting scheduled  Taken 6/22/2025 2011 by Norman Myers RN  Safety Promotion/Fall Prevention:   assistive device/personal items within reach   clutter free environment maintained   fall prevention program maintained   lighting adjusted   nonskid shoes/slippers when out of bed   room organization consistent   safety round/check completed   toileting scheduled  Intervention: Prevent Skin Injury  Recent Flowsheet Documentation  Taken 6/23/2025 0000 by Norman Myers RN  Body Position:   position changed independently   right  Taken 6/22/2025 2011 by Norman Myers RN  Body Position:   position changed independently   sitting up in bed  Intervention: Prevent and Manage VTE (Venous Thromboembolism) Risk  Recent Flowsheet Documentation  Taken 6/22/2025 2011 by Norman Myers RN  VTE Prevention/Management: (freq ambulation)   bilateral   SCDs (sequential compression devices) off   patient refused intervention   other (see comments)  Intervention: Prevent Infection  Recent Flowsheet Documentation  Taken 6/23/2025 0000 by Norman Myers RN  Infection Prevention:   environmental surveillance performed   equipment surfaces disinfected   hand hygiene promoted   rest/sleep promoted   single patient room provided  Taken 6/22/2025 2011 by  Norman Myers RN  Infection Prevention:   environmental surveillance performed   equipment surfaces disinfected   hand hygiene promoted   rest/sleep promoted   single patient room provided  Goal: Optimal Comfort and Wellbeing  Outcome: Progressing  Intervention: Monitor Pain and Promote Comfort  Recent Flowsheet Documentation  Taken 6/23/2025 0103 by Norman Myers RN  Pain Management Interventions: pain medication given  Taken 6/22/2025 2258 by Norman Myers RN  Pain Management Interventions: pain medication given  Taken 6/22/2025 2056 by Norman Myers RN  Pain Management Interventions: pain medication given  Taken 6/22/2025 1914 by Norman Myers RN  Pain Management Interventions:   care clustered   pain management plan reviewed with patient/caregiver   pain medication given   pillow support provided   position adjusted   quiet environment facilitated   relaxation techniques promoted  Intervention: Provide Person-Centered Care  Recent Flowsheet Documentation  Taken 6/22/2025 2011 by Norman Myers RN  Trust Relationship/Rapport:   care explained   choices provided   emotional support provided   empathic listening provided   questions answered   questions encouraged   reassurance provided   thoughts/feelings acknowledged  Goal: Readiness for Transition of Care  Outcome: Progressing     Problem: Pain Acute  Goal: Optimal Pain Control and Function  Outcome: Progressing  Intervention: Optimize Psychosocial Wellbeing  Recent Flowsheet Documentation  Taken 6/22/2025 2011 by Norman Myers RN  Supportive Measures: active listening utilized  Diversional Activities:   television   smartphone  Intervention: Develop Pain Management Plan  Recent Flowsheet Documentation  Taken 6/23/2025 0103 by Norman Myers RN  Pain Management Interventions: pain medication given  Taken 6/22/2025 2258 by Norman Myers RN  Pain Management Interventions: pain  medication given  Taken 6/22/2025 2056 by Norman Myers RN  Pain Management Interventions: pain medication given  Taken 6/22/2025 1914 by Norman Myers RN  Pain Management Interventions:   care clustered   pain management plan reviewed with patient/caregiver   pain medication given   pillow support provided   position adjusted   quiet environment facilitated   relaxation techniques promoted  Intervention: Prevent or Manage Pain  Recent Flowsheet Documentation  Taken 6/23/2025 0000 by Norman Myers RN  Medication Review/Management: medications reviewed  Taken 6/22/2025 2011 by Norman Myers RN  Sensory Stimulation Regulation:   auditory stimulation minimized   lighting decreased   care clustered   quiet environment promoted  Bowel Elimination Promotion:   adequate fluid intake promoted   ambulation promoted   diet adjusted   privacy promoted  Medication Review/Management: medications reviewed   Goal Outcome Evaluation:

## 2025-06-23 NOTE — CASE MANAGEMENT/SOCIAL WORK
Continued Stay Note  [facility]     Patient Name: Evelia Ryan  MRN: [id number]  Today's Date: 6/23/2025    Admit Date: 6/18/2025    Plan: Home   Discharge Plan       Row Name 06/23/25 4637       Plan    Plan Home    Plan Comments Discussed patient in MDR.  Patient having angiogram tomorrow and crani on Wed.  Discharge goal is home with family.  Case Management will continue to follow.                   Discharge Codes    No documentation.                       Vernell Whiteside RN

## 2025-06-23 NOTE — PROGRESS NOTES
Chief complaint: Brain mass, clinically significant cerebral edema     Admit Diagnosis:   Brain mass [G93.89]     Subjective: No events overnight    Objective:    Vitals:    25 0922   BP: 139/78   Pulse: 52   Resp: 18   Temp: 98.4 °F (36.9 °C)   SpO2: 96%     Pulse  Av.8  Min: 45  Max: 62  Systolic (24hrs), Av , Min:116 , Max:139     Diastolic (24hrs), Av, Min:73, Max:95    Temp (24hrs), Av.1 °F (36.7 °C), Min:97.7 °F (36.5 °C), Max:98.4 °F (36.9 °C)      Patient is sitting up in bed, no acute distress. Pleasant and conversant.  is at the bedside.     Lab Results   Component Value Date     2025       A/P:   Admit Diagnosis:   Brain mass [G93.89]     -Continue steroids  -Diagnostic cerebral angiogram with possible embolization in the morning with Dr. Gu at 9AM.   -Craniotomy is scheduled for Wednesday at noon. NPO tomorrow night please. Will plan to get MRI of the brain with and without with fiducials for surgical planning tomorrow afternoon after her angiogram or Wednesday morning    All questions answered at the bedside this morning with the patient and her . They consent to proceed with surgery. Please reach out with any further questions or concerns.     Jaleesa Carlson PA-C
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
## 2025-06-24 ENCOUNTER — ANESTHESIA EVENT (OUTPATIENT)
Dept: PERIOP | Facility: HOSPITAL | Age: 50
End: 2025-06-24
Payer: COMMERCIAL

## 2025-06-24 ENCOUNTER — APPOINTMENT (OUTPATIENT)
Dept: MRI IMAGING | Facility: HOSPITAL | Age: 50
End: 2025-06-24
Payer: COMMERCIAL

## 2025-06-24 LAB
ANION GAP SERPL CALCULATED.3IONS-SCNC: 14.1 MMOL/L (ref 5–15)
BUN SERPL-MCNC: 13.7 MG/DL (ref 6–20)
BUN/CREAT SERPL: 21.7 (ref 7–25)
CALCIUM SPEC-SCNC: 9.6 MG/DL (ref 8.6–10.5)
CHLORIDE SERPL-SCNC: 99 MMOL/L (ref 98–107)
CO2 SERPL-SCNC: 22.9 MMOL/L (ref 22–29)
CREAT SERPL-MCNC: 0.63 MG/DL (ref 0.57–1)
DEPRECATED RDW RBC AUTO: 39.9 FL (ref 37–54)
EGFRCR SERPLBLD CKD-EPI 2021: 106.9 ML/MIN/1.73
ERYTHROCYTE [DISTWIDTH] IN BLOOD BY AUTOMATED COUNT: 12 % (ref 12.3–15.4)
GLUCOSE SERPL-MCNC: 110 MG/DL (ref 65–99)
HCT VFR BLD AUTO: 43.9 % (ref 34–46.6)
HGB BLD-MCNC: 15.4 G/DL (ref 12–15.9)
INR PPP: 0.93 (ref 0.89–1.12)
MCH RBC QN AUTO: 32.2 PG (ref 26.6–33)
MCHC RBC AUTO-ENTMCNC: 35.1 G/DL (ref 31.5–35.7)
MCV RBC AUTO: 91.6 FL (ref 79–97)
PLATELET # BLD AUTO: 338 10*3/MM3 (ref 140–450)
PMV BLD AUTO: 9.7 FL (ref 6–12)
POTASSIUM SERPL-SCNC: 3.9 MMOL/L (ref 3.5–5.2)
PROTHROMBIN TIME: 13 SECONDS (ref 12.2–15.3)
RBC # BLD AUTO: 4.79 10*6/MM3 (ref 3.77–5.28)
SODIUM SERPL-SCNC: 136 MMOL/L (ref 136–145)
WBC NRBC COR # BLD AUTO: 10.11 10*3/MM3 (ref 3.4–10.8)

## 2025-06-24 PROCEDURE — B31CYZZ FLUOROSCOPY OF BILATERAL EXTERNAL CAROTID ARTERIES USING OTHER CONTRAST: ICD-10-PCS | Performed by: RADIOLOGY

## 2025-06-24 PROCEDURE — 03LG3DZ OCCLUSION OF INTRACRANIAL ARTERY WITH INTRALUMINAL DEVICE, PERCUTANEOUS APPROACH: ICD-10-PCS | Performed by: RADIOLOGY

## 2025-06-24 PROCEDURE — 36226 PLACE CATH VERTEBRAL ART: CPT | Performed by: RADIOLOGY

## 2025-06-24 PROCEDURE — 25010000002 DEXAMETHASONE PER 1 MG

## 2025-06-24 PROCEDURE — C1887 CATHETER, GUIDING: HCPCS | Performed by: RADIOLOGY

## 2025-06-24 PROCEDURE — 25010000002 MIDAZOLAM PER 1 MG: Performed by: RADIOLOGY

## 2025-06-24 PROCEDURE — B318YZZ FLUOROSCOPY OF BILATERAL INTERNAL CAROTID ARTERIES USING OTHER CONTRAST: ICD-10-PCS | Performed by: RADIOLOGY

## 2025-06-24 PROCEDURE — C1769 GUIDE WIRE: HCPCS | Performed by: RADIOLOGY

## 2025-06-24 PROCEDURE — 25010000002 MORPHINE PER 10 MG

## 2025-06-24 PROCEDURE — C1889 IMPLANT/INSERT DEVICE, NOC: HCPCS | Performed by: RADIOLOGY

## 2025-06-24 PROCEDURE — 99232 SBSQ HOSP IP/OBS MODERATE 35: CPT | Performed by: INTERNAL MEDICINE

## 2025-06-24 PROCEDURE — 75898 FOLLOW-UP ANGIOGRAPHY: CPT | Performed by: RADIOLOGY

## 2025-06-24 PROCEDURE — 85027 COMPLETE CBC AUTOMATED: CPT | Performed by: INTERNAL MEDICINE

## 2025-06-24 PROCEDURE — 85610 PROTHROMBIN TIME: CPT | Performed by: INTERNAL MEDICINE

## 2025-06-24 PROCEDURE — 25010000002 MORPHINE PER 10 MG: Performed by: INTERNAL MEDICINE

## 2025-06-24 PROCEDURE — 61624 TCAT PERM OCCLS/EMBOLJ CNS: CPT | Performed by: RADIOLOGY

## 2025-06-24 PROCEDURE — 75894 X-RAYS TRANSCATH THERAPY: CPT | Performed by: RADIOLOGY

## 2025-06-24 PROCEDURE — 99152 MOD SED SAME PHYS/QHP 5/>YRS: CPT | Performed by: RADIOLOGY

## 2025-06-24 PROCEDURE — 25010000002 LIDOCAINE PF 1% 1 % SOLUTION: Performed by: RADIOLOGY

## 2025-06-24 PROCEDURE — 36227 PLACE CATH XTRNL CAROTID: CPT | Performed by: RADIOLOGY

## 2025-06-24 PROCEDURE — B31RYZZ FLUOROSCOPY OF INTRACRANIAL ARTERIES USING OTHER CONTRAST: ICD-10-PCS | Performed by: RADIOLOGY

## 2025-06-24 PROCEDURE — 25510000002 IODIXANOL PER 1 ML: Performed by: RADIOLOGY

## 2025-06-24 PROCEDURE — 99153 MOD SED SAME PHYS/QHP EA: CPT | Performed by: RADIOLOGY

## 2025-06-24 PROCEDURE — 25010000002 DEXAMETHASONE PER 1 MG: Performed by: PHYSICIAN ASSISTANT

## 2025-06-24 PROCEDURE — C1894 INTRO/SHEATH, NON-LASER: HCPCS | Performed by: RADIOLOGY

## 2025-06-24 PROCEDURE — 25010000002 FENTANYL CITRATE (PF) 50 MCG/ML SOLUTION: Performed by: RADIOLOGY

## 2025-06-24 PROCEDURE — 36224 PLACE CATH CAROTD ART: CPT | Performed by: RADIOLOGY

## 2025-06-24 PROCEDURE — 76937 US GUIDE VASCULAR ACCESS: CPT | Performed by: RADIOLOGY

## 2025-06-24 PROCEDURE — 25010000002 HEPARIN (PORCINE) PER 1000 UNITS: Performed by: RADIOLOGY

## 2025-06-24 PROCEDURE — B31DYZZ FLUOROSCOPY OF RIGHT VERTEBRAL ARTERY USING OTHER CONTRAST: ICD-10-PCS | Performed by: RADIOLOGY

## 2025-06-24 PROCEDURE — 25010000002 NICARDIPINE 2.5 MG/ML SOLUTION: Performed by: RADIOLOGY

## 2025-06-24 PROCEDURE — 80048 BASIC METABOLIC PNL TOTAL CA: CPT | Performed by: INTERNAL MEDICINE

## 2025-06-24 DEVICE — PARTICL EMB CONTRL PVA 250/355MH BX/5: Type: IMPLANTABLE DEVICE | Status: FUNCTIONAL

## 2025-06-24 DEVICE — 360 SOFT DETACHABLE COIL
Type: IMPLANTABLE DEVICE | Status: FUNCTIONAL
Brand: TARGET XL

## 2025-06-24 RX ORDER — NITROGLYCERIN 0.4 MG/1
0.4 TABLET SUBLINGUAL
Status: DISCONTINUED | OUTPATIENT
Start: 2025-06-24 | End: 2025-06-28 | Stop reason: HOSPADM

## 2025-06-24 RX ORDER — SODIUM CHLORIDE 9 MG/ML
75 INJECTION, SOLUTION INTRAVENOUS CONTINUOUS
Status: ACTIVE | OUTPATIENT
Start: 2025-06-24 | End: 2025-06-24

## 2025-06-24 RX ORDER — HEPARIN SODIUM 1000 [USP'U]/ML
INJECTION, SOLUTION INTRAVENOUS; SUBCUTANEOUS
Status: DISCONTINUED | OUTPATIENT
Start: 2025-06-24 | End: 2025-06-24 | Stop reason: HOSPADM

## 2025-06-24 RX ORDER — FENTANYL CITRATE 50 UG/ML
INJECTION, SOLUTION INTRAMUSCULAR; INTRAVENOUS
Status: DISCONTINUED | OUTPATIENT
Start: 2025-06-24 | End: 2025-06-24 | Stop reason: HOSPADM

## 2025-06-24 RX ORDER — LIDOCAINE HYDROCHLORIDE 10 MG/ML
INJECTION, SOLUTION EPIDURAL; INFILTRATION; INTRACAUDAL; PERINEURAL
Status: DISCONTINUED | OUTPATIENT
Start: 2025-06-24 | End: 2025-06-24 | Stop reason: HOSPADM

## 2025-06-24 RX ORDER — IODIXANOL 320 MG/ML
INJECTION, SOLUTION INTRAVASCULAR
Status: DISCONTINUED | OUTPATIENT
Start: 2025-06-24 | End: 2025-06-24 | Stop reason: HOSPADM

## 2025-06-24 RX ORDER — DEXAMETHASONE SODIUM PHOSPHATE 4 MG/ML
10 INJECTION, SOLUTION INTRA-ARTICULAR; INTRALESIONAL; INTRAMUSCULAR; INTRAVENOUS; SOFT TISSUE ONCE
Status: DISCONTINUED | OUTPATIENT
Start: 2025-06-24 | End: 2025-06-25

## 2025-06-24 RX ORDER — MIDAZOLAM HYDROCHLORIDE 1 MG/ML
INJECTION, SOLUTION INTRAMUSCULAR; INTRAVENOUS
Status: DISCONTINUED | OUTPATIENT
Start: 2025-06-24 | End: 2025-06-24 | Stop reason: HOSPADM

## 2025-06-24 RX ADMIN — HYDROCODONE BITARTRATE AND ACETAMINOPHEN 1 TABLET: 7.5; 325 TABLET ORAL at 03:03

## 2025-06-24 RX ADMIN — DEXAMETHASONE SODIUM PHOSPHATE 4 MG: 4 INJECTION, SOLUTION INTRA-ARTICULAR; INTRALESIONAL; INTRAMUSCULAR; INTRAVENOUS; SOFT TISSUE at 14:18

## 2025-06-24 RX ADMIN — HYDROCODONE BITARTRATE AND ACETAMINOPHEN 1 TABLET: 7.5; 325 TABLET ORAL at 22:22

## 2025-06-24 RX ADMIN — HYDROCODONE BITARTRATE AND ACETAMINOPHEN 1 TABLET: 7.5; 325 TABLET ORAL at 12:55

## 2025-06-24 RX ADMIN — Medication 10 ML: at 20:40

## 2025-06-24 RX ADMIN — HYDROCODONE BITARTRATE AND ACETAMINOPHEN 1 TABLET: 7.5; 325 TABLET ORAL at 16:51

## 2025-06-24 RX ADMIN — DEXAMETHASONE SODIUM PHOSPHATE 4 MG: 4 INJECTION, SOLUTION INTRA-ARTICULAR; INTRALESIONAL; INTRAMUSCULAR; INTRAVENOUS; SOFT TISSUE at 06:20

## 2025-06-24 RX ADMIN — MORPHINE SULFATE 2 MG: 2 INJECTION, SOLUTION INTRAMUSCULAR; INTRAVENOUS at 13:47

## 2025-06-24 RX ADMIN — MORPHINE SULFATE 2 MG: 2 INJECTION, SOLUTION INTRAMUSCULAR; INTRAVENOUS at 09:49

## 2025-06-24 RX ADMIN — MORPHINE SULFATE 2 MG: 2 INJECTION, SOLUTION INTRAMUSCULAR; INTRAVENOUS at 20:18

## 2025-06-24 RX ADMIN — DEXAMETHASONE SODIUM PHOSPHATE 4 MG: 4 INJECTION, SOLUTION INTRA-ARTICULAR; INTRALESIONAL; INTRAMUSCULAR; INTRAVENOUS; SOFT TISSUE at 00:13

## 2025-06-24 RX ADMIN — MORPHINE SULFATE 2 MG: 2 INJECTION, SOLUTION INTRAMUSCULAR; INTRAVENOUS at 00:13

## 2025-06-24 RX ADMIN — DEXAMETHASONE SODIUM PHOSPHATE 4 MG: 4 INJECTION, SOLUTION INTRA-ARTICULAR; INTRALESIONAL; INTRAMUSCULAR; INTRAVENOUS; SOFT TISSUE at 20:19

## 2025-06-24 NOTE — BRIEF OP NOTE
CV CEREBRAL ANGIOGRAM IR  Progress Note    Evelia Ryan  6/24/2025    Pre-op Diagnosis:   Meningioma       Post-Op Diagnosis Codes:   Meningioma    Procedure(s):      Procedure(s):  Cerebral angiogram with possible embolization - Right radial access              Surgeon(s):  Roney Gu MD    Anesthesia: Local    Staff:   Circulator: Milagro Lucas RN  Scrub Person: Garrick Cox  Documenter: Krystal Beckett RN       Estimated Blood Loss: minimal    Urine Voided: * No values recorded between 6/24/2025  8:14 AM and 6/24/2025  9:18 AM *    Specimens:                None      Drains: * No LDAs found *    Findings: Large left P/O meningioma pre-operatively embolized with PVA particles and microcoils.      Complications: None apparent          Roney Gu MD     Date: 6/24/2025  Time: 17:38 EDT

## 2025-06-24 NOTE — PROGRESS NOTES
[facility] Medicine Services  PROGRESS NOTE    Patient Name: Evelia Ryan  : 1975  MRN: [id number]    Date of Admission: 2025  Primary Care Physician: Nina Cheng APRN    Subjective   Subjective     CC:  Headache, brain mass    HPI:  S/p angio this AM with Dr. Gu.  Patient states that went a well.  She is surrounded by family and feels ready for surgery tomorrow      Objective   Objective     Vital Signs:   Temp:  [97.7 °F (36.5 °C)-98.3 °F (36.8 °C)] 97.7 °F (36.5 °C)  Heart Rate:  [42-75] 51  Resp:  [14-17] 14  BP: (104-162)/() 115/71  Flow (L/min) (Oxygen Therapy):  [3] 3     Physical Exam:  Constitutional: No acute distress, awake, alert,  and sons at bedside  HENT: NCAT, mucous membranes moist  Respiratory: Clear to auscultation bilaterally, respiratory effort normal   Cardiovascular: RRR, no murmurs, rubs, or gallops  Gastrointestinal: Positive bowel sounds, soft, nontender, nondistended  Musculoskeletal: No bilateral ankle edema  Psychiatric: Appropriate affect, cooperative  Neurologic: Oriented x 3, ROBB, speech clear  Skin: No rashes      Results Reviewed:  LAB RESULTS:      Lab 25  0611 25  1116 25   WBC 10.11 5.20 6.60   HEMOGLOBIN 15.4 15.1 14.3   HEMATOCRIT 43.9 44.0 42.3   PLATELETS 338 267 252   NEUTROS ABS  --  4.66 5.00   IMMATURE GRANS (ABS)  --  0.04 0.02   LYMPHS ABS  --  0.45* 1.10   MONOS ABS  --  0.04* 0.37   EOS ABS  --  0.00 0.07   MCV 91.6 93.4 93.4   PROTIME 13.0  --   --          Lab 25  0611 25  1116 25   SODIUM 136 137 141   POTASSIUM 3.9 3.9 3.6   CHLORIDE 99 100 102   CO2 22.9 24.0 27.0   ANION GAP 14.1 13.0 12.0   BUN 13.7 10.6 10.3   CREATININE 0.63 0.55* 0.60   EGFR 106.9 110.4 108.2   GLUCOSE 110* 126* 94   CALCIUM 9.6 9.9 9.2         Lab 25  1116 25   TOTAL PROTEIN 7.9 6.8   ALBUMIN 4.3 4.2   GLOBULIN 3.6 2.6   ALT (SGPT) 15 14   AST (SGOT) 16 17  "  BILIRUBIN 0.5 0.4   ALK PHOS 77 67         Lab 06/24/25  0611   PROTIME 13.0   INR 0.93                 Brief Urine Lab Results       None            Microbiology Results Abnormal       None            No radiology results from the last 24 hrs          Current medications:  Scheduled Meds:dexAMETHasone, 4 mg, Intravenous, Q6H  melatonin, 10 mg, Oral, Nightly  sodium chloride, 10 mL, Intravenous, Q12H      Continuous Infusions:   PRN Meds:.  acetaminophen **OR** acetaminophen **OR** acetaminophen    senna-docusate sodium **AND** polyethylene glycol **AND** bisacodyl **AND** bisacodyl    HYDROcodone-acetaminophen    Morphine    nitroglycerin    ondansetron    [COMPLETED] Insert Peripheral IV **AND** sodium chloride    sodium chloride    temazepam    Assessment & Plan   Assessment & Plan     Active Hospital Problems    Diagnosis  POA    **Brain mass [G93.89]  Yes    Anxiety and depression [F41.9, F32.A]  Yes      Resolved Hospital Problems   No resolved problems to display.        Brief Hospital Course to date:  Evelia Ryan is a 50 y.o. female  that for the last few weeks she has noticed increasing frequency of global headaches, occasional dizziness, increased fatigue, bilateral hand pain, and especially vision changes. She states her vision has become more more blurry especially over the last couple of weeks. Her headache became worse over the last 24 hours, with home/over-the-counter agents not helping. She confirms nausea with the headaches as well but no emesis. She also states that she has sometimes noticed when lying in bed certain ways, she will have obvious increase in headache specifically in the occipital region. She states that she \"knew that was probably not good,\" so she went to see her PCP who ordered outpatient imaging, and she states she was called and told that this revealed a occipital brain mass, and that she was instructed to come to the ED      Brain Mass, likely Meningioma  - MRI confirms; " Left occipital convexity extra-axial appearing mass measuring up to 6.2 cm, most likely a partially calcified meningioma. The mass abuts and likely invades the adjacent superior sagittal sinus which otherwise remains patent. Mass effect on the left occipital lobe with narrowing of the left lateral ventricle and without significant herniation or midline shift.  - continue Decadron  - neurosurgery following.  Recs 72 hours of high dose steroids.  Hold on keppra for now since no seizure like activity.  S/p angiogram/embolization today with Dr. Gu to help map out blood supply to mengioma.  Repeat MRI brain with and without with fiducials today.  Plans for surgery with Dr. Mitchell tomorrow  - neuro checks  -NS increased lortab from 5mg to 7.5mg prn to help with headaches  - PT/OT     Anxiety  - prn ativan    Insomnia  --likely d/t steroids.  Increased restoril to 30mg PRN        Expected Discharge Location and Transportation: TBD  Expected Discharge   Expected Discharge Date: 6/28/2025; Expected Discharge Time:      VTE Prophylaxis:  Mechanical VTE prophylaxis orders are present.         AM-PAC 6 Clicks Score (PT): 24 (06/24/25 3156)    CODE STATUS:   Code Status and Medical Interventions: CPR (Attempt to Resuscitate); Full Support   Ordered at: 06/18/25 3377     Code Status (Patient has no pulse and is not breathing):    CPR (Attempt to Resuscitate)     Medical Interventions (Patient has pulse or is breathing):    Full Support     Level Of Support Discussed With:    Patient       Anup Santacruz MD  06/24/25

## 2025-06-24 NOTE — PLAN OF CARE
Problem: Adult Inpatient Plan of Care  Goal: Plan of Care Review  Outcome: Progressing  Flowsheets (Taken 6/22/2025 1642 by Allie Bermudez RN)  Progress: no change  Plan of Care Reviewed With:   patient   spouse  Goal: Patient-Specific Goal (Individualized)  Outcome: Progressing  Goal: Absence of Hospital-Acquired Illness or Injury  Outcome: Progressing  Intervention: Identify and Manage Fall Risk  Recent Flowsheet Documentation  Taken 6/24/2025 0600 by Annia Flores RN  Safety Promotion/Fall Prevention:   activity supervised   assistive device/personal items within reach   clutter free environment maintained   fall prevention program maintained   room organization consistent   safety round/check completed  Taken 6/24/2025 0400 by Annia Flores RN  Safety Promotion/Fall Prevention:   activity supervised   assistive device/personal items within reach   clutter free environment maintained   fall prevention program maintained   room organization consistent   safety round/check completed  Taken 6/24/2025 0200 by Annia Flores RN  Safety Promotion/Fall Prevention:   activity supervised   assistive device/personal items within reach   clutter free environment maintained   fall prevention program maintained   room organization consistent   safety round/check completed  Taken 6/24/2025 0000 by Flores, Annia S, RN  Safety Promotion/Fall Prevention:   activity supervised   assistive device/personal items within reach   clutter free environment maintained   fall prevention program maintained   room organization consistent   safety round/check completed   nonskid shoes/slippers when out of bed  Taken 6/23/2025 2200 by Annia Flores RN  Safety Promotion/Fall Prevention:   activity supervised   assistive device/personal items within reach   clutter free environment maintained   fall prevention program maintained   room organization consistent   safety round/check completed  Taken 6/23/2025 2000  by Flores, Annia S, RN  Safety Promotion/Fall Prevention:   activity supervised   assistive device/personal items within reach   clutter free environment maintained   fall prevention program maintained   room organization consistent   safety round/check completed  Intervention: Prevent Skin Injury  Recent Flowsheet Documentation  Taken 6/24/2025 0600 by Annia Flores RN  Body Position: position changed independently  Taken 6/24/2025 0200 by Annia Flores RN  Body Position: position changed independently  Taken 6/24/2025 0000 by Annia Flores RN  Body Position: position changed independently  Taken 6/23/2025 2200 by Annia Flores RN  Body Position: position changed independently  Taken 6/23/2025 2000 by Annia Flores RN  Body Position: position changed independently  Intervention: Prevent and Manage VTE (Venous Thromboembolism) Risk  Recent Flowsheet Documentation  Taken 6/23/2025 2000 by Annia Flores RN  VTE Prevention/Management: (ambulates frequently) SCDs (sequential compression devices) off  Intervention: Prevent Infection  Recent Flowsheet Documentation  Taken 6/24/2025 0600 by Annia Flores RN  Infection Prevention: environmental surveillance performed  Taken 6/24/2025 0400 by Annia Flores RN  Infection Prevention: environmental surveillance performed  Taken 6/24/2025 0200 by Annia Flores RN  Infection Prevention: environmental surveillance performed  Taken 6/24/2025 0000 by Annia Flores RN  Infection Prevention: environmental surveillance performed  Taken 6/23/2025 2200 by Annia Flores RN  Infection Prevention: environmental surveillance performed  Taken 6/23/2025 2000 by Annia Flores RN  Infection Prevention: environmental surveillance performed  Goal: Optimal Comfort and Wellbeing  Outcome: Progressing  Intervention: Monitor Pain and Promote Comfort  Recent Flowsheet Documentation  Taken 6/23/2025 2229 by  Annia Flores RN  Pain Management Interventions: pain medication given  Taken 6/23/2025 1944 by Annia Flores RN  Pain Management Interventions: pain medication given  Intervention: Provide Person-Centered Care  Recent Flowsheet Documentation  Taken 6/24/2025 0000 by Annia Flores RN  Trust Relationship/Rapport:   care explained   questions encouraged   reassurance provided   thoughts/feelings acknowledged  Taken 6/23/2025 2200 by Annia Flores RN  Trust Relationship/Rapport:   care explained   emotional support provided   empathic listening provided   questions answered   questions encouraged   thoughts/feelings acknowledged   reassurance provided  Taken 6/23/2025 2000 by Annia Flores RN  Trust Relationship/Rapport:   care explained   choices provided   reassurance provided   thoughts/feelings acknowledged  Goal: Readiness for Transition of Care  Outcome: Progressing     Problem: Pain Acute  Goal: Optimal Pain Control and Function  Outcome: Progressing  Intervention: Optimize Psychosocial Wellbeing  Recent Flowsheet Documentation  Taken 6/23/2025 2200 by Annia Flores RN  Diversional Activities: television  Taken 6/23/2025 2000 by Annia Flores RN  Diversional Activities: television  Intervention: Develop Pain Management Plan  Recent Flowsheet Documentation  Taken 6/23/2025 2229 by Annia Flores RN  Pain Management Interventions: pain medication given  Taken 6/23/2025 1944 by Annia Flores RN  Pain Management Interventions: pain medication given  Intervention: Prevent or Manage Pain  Recent Flowsheet Documentation  Taken 6/24/2025 0600 by Annia Flores RN  Medication Review/Management: medications reviewed  Taken 6/24/2025 0400 by Annia Flores RN  Medication Review/Management: medications reviewed  Taken 6/24/2025 0200 by Annia Flores RN  Medication Review/Management: medications reviewed  Taken 6/24/2025 0000 by Sandra  "Annia PANIAGUA RN  Medication Review/Management: medications reviewed  Taken 6/23/2025 2200 by Annia Flores, RN  Medication Review/Management: medications reviewed  Taken 6/23/2025 2000 by Annia Flores, RN  Bowel Elimination Promotion:   adequate fluid intake promoted   ambulation promoted   privacy promoted  Medication Review/Management: medications reviewed   Goal Outcome Evaluation:  Plan of Care Reviewed With: patient        Progress: no change  Outcome Evaluation: Pt remains AOx4, RA, NSR, with no neuro changes. Experiences 8/10 pain in head, described as \"constant pressure,\" with no relief from consistent doses of pain medication. States that temazepam was effective in helping her get a few hours of sleep.                             "

## 2025-06-24 NOTE — PROGRESS NOTES
[facility] Neurosurgery Services  PROGRESS NOTE    Patient Name: Evelia Ryan  : 1975  MRN: [id number]    Date of Admission: 2025  Length of Stay: 5  Primary Care Physician: Nina Cheng APRN    Subjective     CC: Brain mass, clinically significant cerebral edema    Admission diagnosis: Brain mass [G93.89]     HPI: Evelia Ryan is a 50 y.o. female who is going to the cath lab shortly for cerebral angiogram and possible embolization with Dr Gu. Later today she will have MRI with stealth protocol and fiducials for planning craniotomy and mass excision tomorrow afternoon with Dr Mitchell. No events overnight. Patient endorses some anxiety and is tearful.     Review of Systems  ROS is negative except as mentioned in the HPI.    Objective     Vital Signs:   Temp:  [97.7 °F (36.5 °C)-98.4 °F (36.9 °C)] 97.7 °F (36.5 °C)  Heart Rate:  [40-70] 44  Resp:  [14-18] 16  BP: (116-162)/(68-86) 162/85     Pulse  Av.8  Min: 40  Max: 70  Systolic (24hrs), Av , Min:116 , Max:162     Diastolic (24hrs), Av, Min:68, Max:86    Temp (24hrs), Av °F (36.7 °C), Min:97.7 °F (36.5 °C), Max:98.4 °F (36.9 °C)      No intake/output data recorded.    Results Reviewed:  I have personally reviewed current lab, radiology, and data and agree.    Results from last 7 days   Lab Units 25  0611 25  1116 25   WBC 10*3/mm3 10.11 5.20 6.60   HEMOGLOBIN g/dL 15.4 15.1 14.3   HEMATOCRIT % 43.9 44.0 42.3   PLATELETS 10*3/mm3 338 267 252   INR  0.93  --   --      Results from last 7 days   Lab Units 25  0611 25  1116 25   SODIUM mmol/L 136 137 141   POTASSIUM mmol/L 3.9 3.9 3.6   CHLORIDE mmol/L 99 100 102   CO2 mmol/L 22.9 24.0 27.0   BUN mg/dL 13.7 10.6 10.3   CREATININE mg/dL 0.63 0.55* 0.60   GLUCOSE mg/dL 110* 126* 94   CALCIUM mg/dL 9.6 9.9 9.2   ALK PHOS U/L  --  77 67   ALT (SGPT) U/L  --  15 14   AST (SGOT) U/L  --  16 17     Estimated  Creatinine Clearance: 130.6 mL/min (by C-G formula based on SCr of 0.63 mg/dL).    Microbiology Results Abnormal       None            Imaging Results (Last 24 Hours)       ** No results found for the last 24 hours. **              I have reviewed the medications:  Scheduled Meds:[Transfer Hold] dexAMETHasone, 4 mg, Intravenous, Q6H  [Transfer Hold] melatonin, 10 mg, Oral, Nightly  [Transfer Hold] sodium chloride, 10 mL, Intravenous, Q12H      PRN Meds:  [Transfer Hold] acetaminophen **OR** [Transfer Hold] acetaminophen **OR** [Transfer Hold] acetaminophen    [Transfer Hold] senna-docusate sodium **AND** [Transfer Hold] polyethylene glycol **AND** [Transfer Hold] bisacodyl **AND** [Transfer Hold] bisacodyl    fentaNYL citrate (PF)    heparin (porcine)    [Transfer Hold] HYDROcodone-acetaminophen    lidocaine PF 1%    midazolam    [Transfer Hold] Morphine    niCARdipine    nitroglycerin    O2    [Transfer Hold] ondansetron    [COMPLETED] Insert Peripheral IV **AND** [Transfer Hold] sodium chloride    [Transfer Hold] sodium chloride    [Transfer Hold] sodium chloride    [Transfer Hold] temazepam    Physical Exam: Today I find patient reclined in bed with family at bedside.   she Is alert and oriented, conversant, and in no acute distress aside from a bit tearful and anxious about her procedure this morning.  Able to move all four extremities with no sensory deficits.         Assessment / Plan         Brain mass    Anxiety and depression     Please continue steroids. Patient is scheduled for craniotomy and mass excision with Dr Mitchell at 12:00 tomorrow and will be NPO tonight after midnight. She is on the way to cath lab for diagnostic angiogram and possible embolization with Dr Gu. We will place fiducials afterwards for MRI later this afternoon.  She and family had no further questions or concerns and are agreeable to proceed as planned.           Electronically signed by Mary Estevez PA-C, 06/24/25, 08:52  EDT.

## 2025-06-25 ENCOUNTER — APPOINTMENT (OUTPATIENT)
Dept: CT IMAGING | Facility: HOSPITAL | Age: 50
End: 2025-06-25
Payer: COMMERCIAL

## 2025-06-25 ENCOUNTER — ANESTHESIA (OUTPATIENT)
Dept: PERIOP | Facility: HOSPITAL | Age: 50
End: 2025-06-25
Payer: COMMERCIAL

## 2025-06-25 ENCOUNTER — ANESTHESIA EVENT CONVERTED (OUTPATIENT)
Dept: ANESTHESIOLOGY | Facility: HOSPITAL | Age: 50
End: 2025-06-25
Payer: COMMERCIAL

## 2025-06-25 ENCOUNTER — APPOINTMENT (OUTPATIENT)
Dept: MRI IMAGING | Facility: HOSPITAL | Age: 50
End: 2025-06-25
Payer: COMMERCIAL

## 2025-06-25 LAB
ABO GROUP BLD: NORMAL
ABO GROUP BLD: NORMAL
ANION GAP SERPL CALCULATED.3IONS-SCNC: 10 MMOL/L (ref 5–15)
BLD GP AB SCN SERPL QL: NEGATIVE
BUN SERPL-MCNC: 17 MG/DL (ref 6–20)
BUN/CREAT SERPL: 29.3 (ref 7–25)
CALCIUM SPEC-SCNC: 9.2 MG/DL (ref 8.6–10.5)
CHLORIDE SERPL-SCNC: 99 MMOL/L (ref 98–107)
CO2 SERPL-SCNC: 29 MMOL/L (ref 22–29)
CREAT SERPL-MCNC: 0.58 MG/DL (ref 0.57–1)
DEPRECATED RDW RBC AUTO: 40 FL (ref 37–54)
EGFRCR SERPLBLD CKD-EPI 2021: 109 ML/MIN/1.73
ERYTHROCYTE [DISTWIDTH] IN BLOOD BY AUTOMATED COUNT: 12 % (ref 12.3–15.4)
GLUCOSE SERPL-MCNC: 126 MG/DL (ref 65–99)
HCT VFR BLD AUTO: 43.4 % (ref 34–46.6)
HGB BLD-MCNC: 15.2 G/DL (ref 12–15.9)
INR PPP: 0.97 (ref 0.89–1.12)
LAB AP CASE REPORT: NORMAL
Lab: NORMAL
MCH RBC QN AUTO: 32.1 PG (ref 26.6–33)
MCHC RBC AUTO-ENTMCNC: 35 G/DL (ref 31.5–35.7)
MCV RBC AUTO: 91.6 FL (ref 79–97)
PLATELET # BLD AUTO: 316 10*3/MM3 (ref 140–450)
PMV BLD AUTO: 9.6 FL (ref 6–12)
POTASSIUM SERPL-SCNC: 3.9 MMOL/L (ref 3.5–5.2)
PROTHROMBIN TIME: 13.5 SECONDS (ref 12.2–15.3)
RBC # BLD AUTO: 4.74 10*6/MM3 (ref 3.77–5.28)
RH BLD: NEGATIVE
RH BLD: NEGATIVE
SODIUM SERPL-SCNC: 138 MMOL/L (ref 136–145)
T&S EXPIRATION DATE: NORMAL
WBC NRBC COR # BLD AUTO: 9.61 10*3/MM3 (ref 3.4–10.8)

## 2025-06-25 PROCEDURE — 25010000002 DEXAMETHASONE PER 1 MG

## 2025-06-25 PROCEDURE — 86901 BLOOD TYPING SEROLOGIC RH(D): CPT | Performed by: ANESTHESIOLOGY

## 2025-06-25 PROCEDURE — 25010000002 ONDANSETRON PER 1 MG: Performed by: NURSE ANESTHETIST, CERTIFIED REGISTERED

## 2025-06-25 PROCEDURE — 61781 SCAN PROC CRANIAL INTRA: CPT | Performed by: NEUROLOGICAL SURGERY

## 2025-06-25 PROCEDURE — 25010000003 LABETALOL 5 MG/ML SOLUTION: Performed by: NURSE ANESTHETIST, CERTIFIED REGISTERED

## 2025-06-25 PROCEDURE — 85027 COMPLETE CBC AUTOMATED: CPT | Performed by: INTERNAL MEDICINE

## 2025-06-25 PROCEDURE — 99232 SBSQ HOSP IP/OBS MODERATE 35: CPT | Performed by: NURSE PRACTITIONER

## 2025-06-25 PROCEDURE — A9577 INJ MULTIHANCE: HCPCS | Performed by: INTERNAL MEDICINE

## 2025-06-25 PROCEDURE — C1713 ANCHOR/SCREW BN/BN,TIS/BN: HCPCS | Performed by: NEUROLOGICAL SURGERY

## 2025-06-25 PROCEDURE — 25010000002 FENTANYL CITRATE (PF) 100 MCG/2ML SOLUTION: Performed by: NURSE ANESTHETIST, CERTIFIED REGISTERED

## 2025-06-25 PROCEDURE — 86900 BLOOD TYPING SEROLOGIC ABO: CPT

## 2025-06-25 PROCEDURE — 70552 MRI BRAIN STEM W/DYE: CPT

## 2025-06-25 PROCEDURE — 88307 TISSUE EXAM BY PATHOLOGIST: CPT | Performed by: NEUROLOGICAL SURGERY

## 2025-06-25 PROCEDURE — 99232 SBSQ HOSP IP/OBS MODERATE 35: CPT

## 2025-06-25 PROCEDURE — 25010000002 NICARDIPINE 2.5 MG/ML SOLUTION 10 ML VIAL: Performed by: NEUROLOGICAL SURGERY

## 2025-06-25 PROCEDURE — 25010000002 MIDAZOLAM PER 1 MG: Performed by: ANESTHESIOLOGY

## 2025-06-25 PROCEDURE — 85610 PROTHROMBIN TIME: CPT | Performed by: INTERNAL MEDICINE

## 2025-06-25 PROCEDURE — 25810000003 SODIUM CHLORIDE 0.9 % SOLUTION 250 ML FLEX CONT: Performed by: NURSE ANESTHETIST, CERTIFIED REGISTERED

## 2025-06-25 PROCEDURE — 25010000002 MORPHINE PER 10 MG

## 2025-06-25 PROCEDURE — 25810000003 LACTATED RINGERS PER 1000 ML: Performed by: NURSE ANESTHETIST, CERTIFIED REGISTERED

## 2025-06-25 PROCEDURE — 25510000002 GADOBENATE DIMEGLUMINE 529 MG/ML SOLUTION: Performed by: INTERNAL MEDICINE

## 2025-06-25 PROCEDURE — 25010000002 NICARDIPINE 2.5 MG/ML SOLUTION 10 ML VIAL: Performed by: NURSE ANESTHETIST, CERTIFIED REGISTERED

## 2025-06-25 PROCEDURE — 25010000002 GLYCOPYRROLATE 1 MG/5ML SOLUTION: Performed by: NURSE ANESTHETIST, CERTIFIED REGISTERED

## 2025-06-25 PROCEDURE — 80048 BASIC METABOLIC PNL TOTAL CA: CPT | Performed by: INTERNAL MEDICINE

## 2025-06-25 PROCEDURE — 25810000003 SODIUM CHLORIDE 0.9 % SOLUTION 250 ML FLEX CONT: Performed by: NEUROLOGICAL SURGERY

## 2025-06-25 PROCEDURE — 25010000002 FENTANYL CITRATE (PF) 50 MCG/ML SOLUTION

## 2025-06-25 PROCEDURE — 86900 BLOOD TYPING SEROLOGIC ABO: CPT | Performed by: ANESTHESIOLOGY

## 2025-06-25 PROCEDURE — 25010000002 CEFAZOLIN PER 500 MG

## 2025-06-25 PROCEDURE — 88331 PATH CONSLTJ SURG 1 BLK 1SPC: CPT | Performed by: PATHOLOGY

## 2025-06-25 PROCEDURE — C1889 IMPLANT/INSERT DEVICE, NOC: HCPCS | Performed by: NEUROLOGICAL SURGERY

## 2025-06-25 PROCEDURE — 25010000002 SUGAMMADEX 200 MG/2ML SOLUTION: Performed by: NURSE ANESTHETIST, CERTIFIED REGISTERED

## 2025-06-25 PROCEDURE — 00B70ZX EXCISION OF CEREBRAL HEMISPHERE, OPEN APPROACH, DIAGNOSTIC: ICD-10-PCS | Performed by: NEUROLOGICAL SURGERY

## 2025-06-25 PROCEDURE — 25010000002 DEXAMETHASONE PER 1 MG: Performed by: NEUROLOGICAL SURGERY

## 2025-06-25 PROCEDURE — 25010000002 HYDROMORPHONE 1 MG/ML SOLUTION

## 2025-06-25 PROCEDURE — 86901 BLOOD TYPING SEROLOGIC RH(D): CPT

## 2025-06-25 PROCEDURE — 86850 RBC ANTIBODY SCREEN: CPT | Performed by: ANESTHESIOLOGY

## 2025-06-25 PROCEDURE — 25010000002 LIDOCAIN 0.5%-EPINEPHRINE 1:200000 0.5 %-1:200000 SOLUTION: Performed by: NEUROLOGICAL SURGERY

## 2025-06-25 PROCEDURE — 61512 CRNEC TREPH EXC MNGIOMA STTL: CPT

## 2025-06-25 PROCEDURE — 25010000002 LIDOCAINE PF 1% 1 % SOLUTION: Performed by: NURSE ANESTHETIST, CERTIFIED REGISTERED

## 2025-06-25 PROCEDURE — 70450 CT HEAD/BRAIN W/O DYE: CPT

## 2025-06-25 PROCEDURE — 25010000002 PROPOFOL 10 MG/ML EMULSION: Performed by: NURSE ANESTHETIST, CERTIFIED REGISTERED

## 2025-06-25 PROCEDURE — 25010000002 CEFAZOLIN PER 500 MG: Performed by: NEUROLOGICAL SURGERY

## 2025-06-25 PROCEDURE — 25010000002 FUROSEMIDE PER 20 MG: Performed by: NURSE ANESTHETIST, CERTIFIED REGISTERED

## 2025-06-25 PROCEDURE — 25010000002 FENTANYL CITRATE (PF) 50 MCG/ML SOLUTION: Performed by: NURSE ANESTHETIST, CERTIFIED REGISTERED

## 2025-06-25 PROCEDURE — 61512 CRNEC TREPH EXC MNGIOMA STTL: CPT | Performed by: NEUROLOGICAL SURGERY

## 2025-06-25 PROCEDURE — 25010000002 DEXAMETHASONE PER 1 MG: Performed by: NURSE ANESTHETIST, CERTIFIED REGISTERED

## 2025-06-25 PROCEDURE — 25810000003 LACTATED RINGERS PER 1000 ML: Performed by: ANESTHESIOLOGY

## 2025-06-25 DEVICE — PLT MATRIXNEURO STR TI 2HL 12MM: Type: IMPLANTABLE DEVICE | Site: BRAIN | Status: FUNCTIONAL

## 2025-06-25 DEVICE — HEMOST ABS SURGIFOAM SZ100 8X12 10MM: Type: IMPLANTABLE DEVICE | Site: BRAIN | Status: FUNCTIONAL

## 2025-06-25 DEVICE — FLOSEAL WITH RECOTHROM - 10ML.
Type: IMPLANTABLE DEVICE | Site: BRAIN | Status: FUNCTIONAL
Brand: FLOSEAL HEMOSTATIC MATRIX

## 2025-06-25 DEVICE — DURAGEN® PLUS DURAL REGENERATION MATRIX, 3 IN X 3 IN (7.5 CM X 7.5 CM)
Type: IMPLANTABLE DEVICE | Site: BRAIN | Status: FUNCTIONAL
Brand: DURAGEN® PLUS

## 2025-06-25 DEVICE — PLT CVR BURHL MATRIXNEURO TI 17MM: Type: IMPLANTABLE DEVICE | Site: BRAIN | Status: FUNCTIONAL

## 2025-06-25 DEVICE — SCRW MATRIXNEURO EMERG TI 4MM: Type: IMPLANTABLE DEVICE | Site: BRAIN | Status: FUNCTIONAL

## 2025-06-25 RX ORDER — DEXAMETHASONE SODIUM PHOSPHATE 4 MG/ML
INJECTION, SOLUTION INTRA-ARTICULAR; INTRALESIONAL; INTRAMUSCULAR; INTRAVENOUS; SOFT TISSUE AS NEEDED
Status: DISCONTINUED | OUTPATIENT
Start: 2025-06-25 | End: 2025-06-25 | Stop reason: SURG

## 2025-06-25 RX ORDER — PROPOFOL 10 MG/ML
VIAL (ML) INTRAVENOUS AS NEEDED
Status: DISCONTINUED | OUTPATIENT
Start: 2025-06-25 | End: 2025-06-25 | Stop reason: SURG

## 2025-06-25 RX ORDER — FAMOTIDINE 10 MG/ML
20 INJECTION, SOLUTION INTRAVENOUS ONCE
Status: DISCONTINUED | OUTPATIENT
Start: 2025-06-25 | End: 2025-06-25 | Stop reason: HOSPADM

## 2025-06-25 RX ORDER — DIAZEPAM 5 MG/1
5 TABLET ORAL NIGHTLY PRN
Status: DISCONTINUED | OUTPATIENT
Start: 2025-06-25 | End: 2025-06-26

## 2025-06-25 RX ORDER — FENTANYL CITRATE 50 UG/ML
INJECTION, SOLUTION INTRAMUSCULAR; INTRAVENOUS
Status: COMPLETED
Start: 2025-06-25 | End: 2025-06-25

## 2025-06-25 RX ORDER — SODIUM CHLORIDE, SODIUM LACTATE, POTASSIUM CHLORIDE, CALCIUM CHLORIDE 600; 310; 30; 20 MG/100ML; MG/100ML; MG/100ML; MG/100ML
9 INJECTION, SOLUTION INTRAVENOUS CONTINUOUS
Status: ACTIVE | OUTPATIENT
Start: 2025-06-25 | End: 2025-06-26

## 2025-06-25 RX ORDER — SODIUM CHLORIDE 0.9 % (FLUSH) 0.9 %
10 SYRINGE (ML) INJECTION AS NEEDED
Status: DISCONTINUED | OUTPATIENT
Start: 2025-06-25 | End: 2025-06-28 | Stop reason: HOSPADM

## 2025-06-25 RX ORDER — MIDAZOLAM HYDROCHLORIDE 1 MG/ML
1 INJECTION, SOLUTION INTRAMUSCULAR; INTRAVENOUS
Status: DISCONTINUED | OUTPATIENT
Start: 2025-06-25 | End: 2025-06-25 | Stop reason: HOSPADM

## 2025-06-25 RX ORDER — LABETALOL HYDROCHLORIDE 5 MG/ML
5 INJECTION, SOLUTION INTRAVENOUS
Status: DISCONTINUED | OUTPATIENT
Start: 2025-06-25 | End: 2025-06-25 | Stop reason: HOSPADM

## 2025-06-25 RX ORDER — IPRATROPIUM BROMIDE AND ALBUTEROL SULFATE 2.5; .5 MG/3ML; MG/3ML
3 SOLUTION RESPIRATORY (INHALATION) ONCE AS NEEDED
Status: DISCONTINUED | OUTPATIENT
Start: 2025-06-25 | End: 2025-06-25 | Stop reason: HOSPADM

## 2025-06-25 RX ORDER — LIDOCAINE HYDROCHLORIDE AND EPINEPHRINE 5; 5 MG/ML; UG/ML
INJECTION, SOLUTION INFILTRATION; PERINEURAL AS NEEDED
Status: DISCONTINUED | OUTPATIENT
Start: 2025-06-25 | End: 2025-06-25 | Stop reason: HOSPADM

## 2025-06-25 RX ORDER — FENTANYL CITRATE 50 UG/ML
INJECTION, SOLUTION INTRAMUSCULAR; INTRAVENOUS AS NEEDED
Status: DISCONTINUED | OUTPATIENT
Start: 2025-06-25 | End: 2025-06-25 | Stop reason: SURG

## 2025-06-25 RX ORDER — SODIUM CHLORIDE 0.9 % (FLUSH) 0.9 %
10 SYRINGE (ML) INJECTION EVERY 12 HOURS SCHEDULED
Status: DISCONTINUED | OUTPATIENT
Start: 2025-06-25 | End: 2025-06-25 | Stop reason: HOSPADM

## 2025-06-25 RX ORDER — MAGNESIUM HYDROXIDE 1200 MG/15ML
LIQUID ORAL AS NEEDED
Status: DISCONTINUED | OUTPATIENT
Start: 2025-06-25 | End: 2025-06-25 | Stop reason: HOSPADM

## 2025-06-25 RX ORDER — LABETALOL HYDROCHLORIDE 5 MG/ML
INJECTION, SOLUTION INTRAVENOUS AS NEEDED
Status: DISCONTINUED | OUTPATIENT
Start: 2025-06-25 | End: 2025-06-25 | Stop reason: SURG

## 2025-06-25 RX ORDER — FUROSEMIDE 10 MG/ML
INJECTION INTRAMUSCULAR; INTRAVENOUS AS NEEDED
Status: DISCONTINUED | OUTPATIENT
Start: 2025-06-25 | End: 2025-06-25 | Stop reason: SURG

## 2025-06-25 RX ORDER — FAMOTIDINE 20 MG/1
20 TABLET, FILM COATED ORAL ONCE
Status: COMPLETED | OUTPATIENT
Start: 2025-06-25 | End: 2025-06-25

## 2025-06-25 RX ORDER — DEXAMETHASONE SODIUM PHOSPHATE 4 MG/ML
10 INJECTION, SOLUTION INTRA-ARTICULAR; INTRALESIONAL; INTRAMUSCULAR; INTRAVENOUS; SOFT TISSUE ONCE
Status: DISCONTINUED | OUTPATIENT
Start: 2025-06-25 | End: 2025-06-25 | Stop reason: HOSPADM

## 2025-06-25 RX ORDER — SODIUM CHLORIDE 0.9 % (FLUSH) 0.9 %
3 SYRINGE (ML) INJECTION EVERY 12 HOURS SCHEDULED
Status: DISCONTINUED | OUTPATIENT
Start: 2025-06-25 | End: 2025-06-25 | Stop reason: HOSPADM

## 2025-06-25 RX ORDER — LIDOCAINE HYDROCHLORIDE 10 MG/ML
0.5 INJECTION, SOLUTION EPIDURAL; INFILTRATION; INTRACAUDAL; PERINEURAL ONCE AS NEEDED
Status: DISCONTINUED | OUTPATIENT
Start: 2025-06-25 | End: 2025-06-25 | Stop reason: HOSPADM

## 2025-06-25 RX ORDER — SODIUM CHLORIDE 9 MG/ML
40 INJECTION, SOLUTION INTRAVENOUS AS NEEDED
Status: DISCONTINUED | OUTPATIENT
Start: 2025-06-25 | End: 2025-06-28 | Stop reason: HOSPADM

## 2025-06-25 RX ORDER — ONDANSETRON 2 MG/ML
INJECTION INTRAMUSCULAR; INTRAVENOUS AS NEEDED
Status: DISCONTINUED | OUTPATIENT
Start: 2025-06-25 | End: 2025-06-25 | Stop reason: SURG

## 2025-06-25 RX ORDER — DROPERIDOL 2.5 MG/ML
0.62 INJECTION, SOLUTION INTRAMUSCULAR; INTRAVENOUS ONCE AS NEEDED
Status: DISCONTINUED | OUTPATIENT
Start: 2025-06-25 | End: 2025-06-25 | Stop reason: HOSPADM

## 2025-06-25 RX ORDER — FENTANYL CITRATE 50 UG/ML
50 INJECTION, SOLUTION INTRAMUSCULAR; INTRAVENOUS
Status: DISCONTINUED | OUTPATIENT
Start: 2025-06-25 | End: 2025-06-25 | Stop reason: HOSPADM

## 2025-06-25 RX ORDER — MANNITOL 20 G/100ML
INJECTION, SOLUTION INTRAVENOUS CONTINUOUS PRN
Status: DISCONTINUED | OUTPATIENT
Start: 2025-06-25 | End: 2025-06-25 | Stop reason: SURG

## 2025-06-25 RX ORDER — DROPERIDOL 2.5 MG/ML
0.62 INJECTION, SOLUTION INTRAMUSCULAR; INTRAVENOUS
Status: DISCONTINUED | OUTPATIENT
Start: 2025-06-25 | End: 2025-06-25 | Stop reason: HOSPADM

## 2025-06-25 RX ORDER — PROMETHAZINE HYDROCHLORIDE 25 MG/1
25 SUPPOSITORY RECTAL ONCE AS NEEDED
Status: DISCONTINUED | OUTPATIENT
Start: 2025-06-25 | End: 2025-06-25 | Stop reason: HOSPADM

## 2025-06-25 RX ORDER — SODIUM CHLORIDE 9 MG/ML
9 INJECTION, SOLUTION INTRAVENOUS AS NEEDED
Status: DISCONTINUED | OUTPATIENT
Start: 2025-06-25 | End: 2025-06-25 | Stop reason: HOSPADM

## 2025-06-25 RX ORDER — SODIUM CHLORIDE, SODIUM LACTATE, POTASSIUM CHLORIDE, CALCIUM CHLORIDE 600; 310; 30; 20 MG/100ML; MG/100ML; MG/100ML; MG/100ML
INJECTION, SOLUTION INTRAVENOUS CONTINUOUS PRN
Status: DISCONTINUED | OUTPATIENT
Start: 2025-06-25 | End: 2025-06-25 | Stop reason: SURG

## 2025-06-25 RX ORDER — NICARDIPINE HYDROCHLORIDE 2.5 MG/ML
INJECTION INTRAVENOUS
Status: DISPENSED
Start: 2025-06-25 | End: 2025-06-26

## 2025-06-25 RX ORDER — MIDAZOLAM HYDROCHLORIDE 1 MG/ML
2 INJECTION, SOLUTION INTRAMUSCULAR; INTRAVENOUS ONCE
Status: COMPLETED | OUTPATIENT
Start: 2025-06-25 | End: 2025-06-25

## 2025-06-25 RX ORDER — SODIUM CHLORIDE 0.9 % (FLUSH) 0.9 %
10 SYRINGE (ML) INJECTION EVERY 12 HOURS SCHEDULED
Status: DISCONTINUED | OUTPATIENT
Start: 2025-06-25 | End: 2025-06-28 | Stop reason: HOSPADM

## 2025-06-25 RX ORDER — HYDROCODONE BITARTRATE AND ACETAMINOPHEN 5; 325 MG/1; MG/1
1 TABLET ORAL ONCE AS NEEDED
Status: DISCONTINUED | OUTPATIENT
Start: 2025-06-25 | End: 2025-06-25 | Stop reason: HOSPADM

## 2025-06-25 RX ORDER — SODIUM CHLORIDE 0.9 % (FLUSH) 0.9 %
10 SYRINGE (ML) INJECTION AS NEEDED
Status: DISCONTINUED | OUTPATIENT
Start: 2025-06-25 | End: 2025-06-25 | Stop reason: HOSPADM

## 2025-06-25 RX ORDER — LIDOCAINE HYDROCHLORIDE 10 MG/ML
INJECTION, SOLUTION EPIDURAL; INFILTRATION; INTRACAUDAL; PERINEURAL AS NEEDED
Status: DISCONTINUED | OUTPATIENT
Start: 2025-06-25 | End: 2025-06-25 | Stop reason: SURG

## 2025-06-25 RX ORDER — HYDRALAZINE HYDROCHLORIDE 20 MG/ML
5 INJECTION INTRAMUSCULAR; INTRAVENOUS
Status: DISCONTINUED | OUTPATIENT
Start: 2025-06-25 | End: 2025-06-25 | Stop reason: HOSPADM

## 2025-06-25 RX ORDER — SODIUM CHLORIDE 0.9 % (FLUSH) 0.9 %
3-10 SYRINGE (ML) INJECTION AS NEEDED
Status: DISCONTINUED | OUTPATIENT
Start: 2025-06-25 | End: 2025-06-25 | Stop reason: HOSPADM

## 2025-06-25 RX ORDER — GLYCOPYRROLATE 0.2 MG/ML
INJECTION INTRAMUSCULAR; INTRAVENOUS AS NEEDED
Status: DISCONTINUED | OUTPATIENT
Start: 2025-06-25 | End: 2025-06-25 | Stop reason: SURG

## 2025-06-25 RX ORDER — PROMETHAZINE HYDROCHLORIDE 25 MG/1
25 TABLET ORAL ONCE AS NEEDED
Status: DISCONTINUED | OUTPATIENT
Start: 2025-06-25 | End: 2025-06-25 | Stop reason: HOSPADM

## 2025-06-25 RX ORDER — HYDROMORPHONE HYDROCHLORIDE 1 MG/ML
0.5 INJECTION, SOLUTION INTRAMUSCULAR; INTRAVENOUS; SUBCUTANEOUS
Status: DISCONTINUED | OUTPATIENT
Start: 2025-06-25 | End: 2025-06-25 | Stop reason: HOSPADM

## 2025-06-25 RX ORDER — ONDANSETRON 2 MG/ML
4 INJECTION INTRAMUSCULAR; INTRAVENOUS ONCE AS NEEDED
Status: DISCONTINUED | OUTPATIENT
Start: 2025-06-25 | End: 2025-06-25 | Stop reason: HOSPADM

## 2025-06-25 RX ORDER — ROCURONIUM BROMIDE 10 MG/ML
INJECTION, SOLUTION INTRAVENOUS AS NEEDED
Status: DISCONTINUED | OUTPATIENT
Start: 2025-06-25 | End: 2025-06-25 | Stop reason: SURG

## 2025-06-25 RX ORDER — NALOXONE HCL 0.4 MG/ML
0.4 VIAL (ML) INJECTION AS NEEDED
Status: DISCONTINUED | OUTPATIENT
Start: 2025-06-25 | End: 2025-06-25 | Stop reason: HOSPADM

## 2025-06-25 RX ORDER — HYDROCODONE BITARTRATE AND ACETAMINOPHEN 7.5; 325 MG/1; MG/1
1 TABLET ORAL EVERY 4 HOURS PRN
Status: DISCONTINUED | OUTPATIENT
Start: 2025-06-25 | End: 2025-06-25 | Stop reason: HOSPADM

## 2025-06-25 RX ORDER — SODIUM CHLORIDE, SODIUM LACTATE, POTASSIUM CHLORIDE, CALCIUM CHLORIDE 600; 310; 30; 20 MG/100ML; MG/100ML; MG/100ML; MG/100ML
9 INJECTION, SOLUTION INTRAVENOUS CONTINUOUS
Status: ACTIVE | OUTPATIENT
Start: 2025-06-26 | End: 2025-06-26

## 2025-06-25 RX ADMIN — DEXAMETHASONE SODIUM PHOSPHATE 10 MG: 4 INJECTION, SOLUTION INTRA-ARTICULAR; INTRALESIONAL; INTRAMUSCULAR; INTRAVENOUS; SOFT TISSUE at 13:01

## 2025-06-25 RX ADMIN — DEXAMETHASONE SODIUM PHOSPHATE 4 MG: 4 INJECTION, SOLUTION INTRA-ARTICULAR; INTRALESIONAL; INTRAMUSCULAR; INTRAVENOUS; SOFT TISSUE at 06:25

## 2025-06-25 RX ADMIN — HYDROMORPHONE HYDROCHLORIDE 0.5 MG: 1 INJECTION, SOLUTION INTRAMUSCULAR; INTRAVENOUS; SUBCUTANEOUS at 17:23

## 2025-06-25 RX ADMIN — ACETAMINOPHEN 650 MG: 325 TABLET ORAL at 22:58

## 2025-06-25 RX ADMIN — ROCURONIUM 20 MG: 50 INJECTION, SOLUTION INTRAVENOUS at 14:05

## 2025-06-25 RX ADMIN — FENTANYL CITRATE 100 MCG: 50 INJECTION, SOLUTION INTRAMUSCULAR; INTRAVENOUS at 12:32

## 2025-06-25 RX ADMIN — FUROSEMIDE 20 MG: 10 INJECTION, SOLUTION INTRAMUSCULAR; INTRAVENOUS at 14:13

## 2025-06-25 RX ADMIN — DEXAMETHASONE SODIUM PHOSPHATE 4 MG: 4 INJECTION, SOLUTION INTRA-ARTICULAR; INTRALESIONAL; INTRAMUSCULAR; INTRAVENOUS; SOFT TISSUE at 00:15

## 2025-06-25 RX ADMIN — LIDOCAINE HYDROCHLORIDE 50 MG: 10 INJECTION, SOLUTION EPIDURAL; INFILTRATION; INTRACAUDAL; PERINEURAL at 12:32

## 2025-06-25 RX ADMIN — FENTANYL CITRATE 50 MCG: 50 INJECTION, SOLUTION INTRAMUSCULAR; INTRAVENOUS at 17:39

## 2025-06-25 RX ADMIN — MUPIROCIN 1 APPLICATION: 20 OINTMENT TOPICAL at 21:05

## 2025-06-25 RX ADMIN — HYDROMORPHONE HYDROCHLORIDE 0.5 MG: 1 INJECTION, SOLUTION INTRAMUSCULAR; INTRAVENOUS; SUBCUTANEOUS at 17:04

## 2025-06-25 RX ADMIN — SODIUM CHLORIDE, SODIUM LACTATE, POTASSIUM CHLORIDE, CALCIUM CHLORIDE: 600; 310; 30; 20 INJECTION, SOLUTION INTRAVENOUS at 13:04

## 2025-06-25 RX ADMIN — PROPOFOL 50 MG: 10 INJECTION, EMULSION INTRAVENOUS at 14:23

## 2025-06-25 RX ADMIN — PROPOFOL 50 MG: 10 INJECTION, EMULSION INTRAVENOUS at 13:26

## 2025-06-25 RX ADMIN — SODIUM CHLORIDE, POTASSIUM CHLORIDE, SODIUM LACTATE AND CALCIUM CHLORIDE: 600; 310; 30; 20 INJECTION, SOLUTION INTRAVENOUS at 12:21

## 2025-06-25 RX ADMIN — GLYCOPYRROLATE 0.2 MG: 0.2 INJECTION, SOLUTION INTRAMUSCULAR; INTRAVENOUS at 13:07

## 2025-06-25 RX ADMIN — ACETAMINOPHEN 650 MG: 325 TABLET ORAL at 18:54

## 2025-06-25 RX ADMIN — MORPHINE SULFATE 2 MG: 2 INJECTION, SOLUTION INTRAMUSCULAR; INTRAVENOUS at 04:27

## 2025-06-25 RX ADMIN — Medication 10 ML: at 08:22

## 2025-06-25 RX ADMIN — MIDAZOLAM HYDROCHLORIDE 2 MG: 1 INJECTION, SOLUTION INTRAMUSCULAR; INTRAVENOUS at 10:52

## 2025-06-25 RX ADMIN — Medication 5 MG: at 15:50

## 2025-06-25 RX ADMIN — ROCURONIUM 100 MG: 50 INJECTION, SOLUTION INTRAVENOUS at 12:32

## 2025-06-25 RX ADMIN — MORPHINE SULFATE 2 MG: 2 INJECTION, SOLUTION INTRAMUSCULAR; INTRAVENOUS at 00:15

## 2025-06-25 RX ADMIN — SODIUM CHLORIDE 2000 MG: 900 INJECTION INTRAVENOUS at 22:59

## 2025-06-25 RX ADMIN — DIAZEPAM 5 MG: 5 TABLET ORAL at 21:05

## 2025-06-25 RX ADMIN — SODIUM CHLORIDE, SODIUM LACTATE, POTASSIUM CHLORIDE, CALCIUM CHLORIDE: 600; 310; 30; 20 INJECTION, SOLUTION INTRAVENOUS at 12:21

## 2025-06-25 RX ADMIN — ONDANSETRON 4 MG: 2 INJECTION INTRAMUSCULAR; INTRAVENOUS at 15:17

## 2025-06-25 RX ADMIN — SODIUM CHLORIDE 5 MG/HR: 9 INJECTION, SOLUTION INTRAVENOUS at 13:29

## 2025-06-25 RX ADMIN — GADOBENATE DIMEGLUMINE 20 ML: 529 INJECTION, SOLUTION INTRAVENOUS at 01:10

## 2025-06-25 RX ADMIN — HYDROCODONE BITARTRATE AND ACETAMINOPHEN 1 TABLET: 7.5; 325 TABLET ORAL at 22:58

## 2025-06-25 RX ADMIN — SUGAMMADEX 300 MG: 100 INJECTION, SOLUTION INTRAVENOUS at 16:27

## 2025-06-25 RX ADMIN — HYDROCODONE BITARTRATE AND ACETAMINOPHEN 1 TABLET: 7.5; 325 TABLET ORAL at 18:43

## 2025-06-25 RX ADMIN — PROPOFOL 150 MCG/KG/MIN: 10 INJECTION, EMULSION INTRAVENOUS at 12:39

## 2025-06-25 RX ADMIN — FENTANYL CITRATE 50 MCG: 50 INJECTION, SOLUTION INTRAMUSCULAR; INTRAVENOUS at 17:46

## 2025-06-25 RX ADMIN — SODIUM CHLORIDE 2 G: 900 INJECTION INTRAVENOUS at 13:11

## 2025-06-25 RX ADMIN — GLYCOPYRROLATE 0.2 MG: 0.2 INJECTION, SOLUTION INTRAMUSCULAR; INTRAVENOUS at 12:30

## 2025-06-25 RX ADMIN — SODIUM CHLORIDE 7.5 MG/HR: 9 INJECTION, SOLUTION INTRAVENOUS at 18:27

## 2025-06-25 RX ADMIN — FAMOTIDINE 20 MG: 20 TABLET, FILM COATED ORAL at 10:52

## 2025-06-25 RX ADMIN — SODIUM CHLORIDE, SODIUM LACTATE, POTASSIUM CHLORIDE, CALCIUM CHLORIDE 9 ML/HR: 20; 30; 600; 310 INJECTION, SOLUTION INTRAVENOUS at 10:52

## 2025-06-25 RX ADMIN — REMIFENTANIL HYDROCHLORIDE 0.1 MCG/KG/MIN: 1 INJECTION, POWDER, LYOPHILIZED, FOR SOLUTION INTRAVENOUS at 12:39

## 2025-06-25 RX ADMIN — Medication 10 MG: at 16:35

## 2025-06-25 RX ADMIN — PROPOFOL 300 MG: 10 INJECTION, EMULSION INTRAVENOUS at 12:32

## 2025-06-25 RX ADMIN — DEXAMETHASONE SODIUM PHOSPHATE 4 MG: 4 INJECTION, SOLUTION INTRA-ARTICULAR; INTRALESIONAL; INTRAMUSCULAR; INTRAVENOUS; SOFT TISSUE at 18:27

## 2025-06-25 RX ADMIN — MANNITOL: 20 INJECTION, SOLUTION INTRAVENOUS at 12:54

## 2025-06-25 RX ADMIN — Medication 5 MG: at 16:10

## 2025-06-25 RX ADMIN — ROCURONIUM 20 MG: 50 INJECTION, SOLUTION INTRAVENOUS at 15:00

## 2025-06-25 NOTE — PROGRESS NOTES
Chief complaint: S/p embolization, brain mass, clinically significant cerebral edema     Admit Diagnosis:   Brain mass [G93.89]     Subjective: No events overnight. Patient somewhat tearful and anxious but overall is in good spirits. Her  is at the bedside.     Objective:    Vitals:    25 0815   BP: 155/92   Pulse: 50   Resp: 18   Temp: 97.1 °F (36.2 °C)   SpO2: 96%     Pulse  Av.5  Min: 43  Max: 75  Systolic (24hrs), Av , Min:104 , Max:155     Diastolic (24hrs), Av, Min:55, Max:106    Temp (24hrs), Av.2 °F (36.2 °C), Min:96.8 °F (36 °C), Max:97.6 °F (36.4 °C)      Awake, alert, pleasant and conversant. She is anxious and tearful but did calm down some. Able to move all 4 extremities to command. Radial incision from embolization dressing is dry.     Lab Results   Component Value Date     2025       A/P:   Admit Diagnosis:   Brain mass [G93.89]     Patient underwent MRI with fiducials yesterday for surgical planning. She is s/p embolization with  Given day one. Awaiting surgery this afternoon. She is scheduled for noon today for craniotomy. I will see her in preop. All questions answered this morning with the patient and her  who were at the bedside.     Jaleesa Carlson PA-C

## 2025-06-25 NOTE — PLAN OF CARE
Goal Outcome Evaluation:         Patient arrived from PACU in no acute distress, CT obtained, nicardipine infusing to keep SBP <120. Patient oriented x4, moves all extremities, strength equal bilaterally, sensory assessment intact, but patient reports 10/10 headache.   Neurosurgery aware of headache, norco and tylenol administered.

## 2025-06-25 NOTE — PLAN OF CARE
Problem: Adult Inpatient Plan of Care  Goal: Plan of Care Review  Outcome: Progressing  Flowsheets (Taken 6/25/2025 0431)  Progress: no change  Plan of Care Reviewed With:   patient   spouse  Goal: Patient-Specific Goal (Individualized)  Outcome: Progressing  Goal: Absence of Hospital-Acquired Illness or Injury  Outcome: Progressing  Intervention: Identify and Manage Fall Risk  Recent Flowsheet Documentation  Taken 6/25/2025 0200 by Tamra Martins RN  Safety Promotion/Fall Prevention:   activity supervised   assistive device/personal items within reach   clutter free environment maintained   fall prevention program maintained   nonskid shoes/slippers when out of bed   room organization consistent   safety round/check completed  Taken 6/25/2025 0000 by Tamra Martins RN  Safety Promotion/Fall Prevention:   activity supervised   assistive device/personal items within reach   clutter free environment maintained   fall prevention program maintained   nonskid shoes/slippers when out of bed   room organization consistent   safety round/check completed  Taken 6/24/2025 2200 by Tamra Martins RN  Safety Promotion/Fall Prevention:   activity supervised   assistive device/personal items within reach   clutter free environment maintained   fall prevention program maintained   nonskid shoes/slippers when out of bed   room organization consistent   safety round/check completed  Taken 6/24/2025 2000 by Tamra Martins RN  Safety Promotion/Fall Prevention:   activity supervised   assistive device/personal items within reach   clutter free environment maintained   fall prevention program maintained   nonskid shoes/slippers when out of bed   room organization consistent   safety round/check completed  Intervention: Prevent Skin Injury  Recent Flowsheet Documentation  Taken 6/25/2025 0200 by Tamra Martins RN  Body Position: position changed independently  Taken 6/25/2025 0000 by Tamra Martins RN  Body Position: position changed independently  Taken  6/24/2025 2200 by Tamra Martins RN  Body Position: position changed independently  Taken 6/24/2025 2000 by Tamra Martins RN  Body Position: position changed independently  Intervention: Prevent and Manage VTE (Venous Thromboembolism) Risk  Recent Flowsheet Documentation  Taken 6/24/2025 2000 by Tamra Martins RN  VTE Prevention/Management: (ambulates frequently)   SCDs (sequential compression devices) off   other (see comments)  Intervention: Prevent Infection  Recent Flowsheet Documentation  Taken 6/25/2025 0200 by Tamra Martins RN  Infection Prevention:   cohorting utilized   environmental surveillance performed   equipment surfaces disinfected   hand hygiene promoted   personal protective equipment utilized   rest/sleep promoted   single patient room provided  Taken 6/25/2025 0000 by Tamra Martins RN  Infection Prevention:   cohorting utilized   environmental surveillance performed   equipment surfaces disinfected   hand hygiene promoted   personal protective equipment utilized   rest/sleep promoted   single patient room provided  Taken 6/24/2025 2200 by aTmra Martins RN  Infection Prevention:   cohorting utilized   environmental surveillance performed   equipment surfaces disinfected   hand hygiene promoted   personal protective equipment utilized   rest/sleep promoted   single patient room provided  Taken 6/24/2025 2000 by Tamra Martins RN  Infection Prevention:   cohorting utilized   environmental surveillance performed   equipment surfaces disinfected   hand hygiene promoted   personal protective equipment utilized   rest/sleep promoted   single patient room provided  Goal: Optimal Comfort and Wellbeing  Outcome: Progressing  Intervention: Provide Person-Centered Care  Recent Flowsheet Documentation  Taken 6/24/2025 2000 by Tamra Martins RN  Trust Relationship/Rapport:   care explained   choices provided   emotional support provided   empathic listening provided   questions answered   questions encouraged   reassurance  provided   thoughts/feelings acknowledged  Goal: Readiness for Transition of Care  Outcome: Progressing     Problem: Pain Acute  Goal: Optimal Pain Control and Function  Outcome: Progressing  Intervention: Optimize Psychosocial Wellbeing  Recent Flowsheet Documentation  Taken 6/24/2025 2000 by Tamra Martins RN  Supportive Measures:   active listening utilized   verbalization of feelings encouraged  Diversional Activities: television  Intervention: Prevent or Manage Pain  Recent Flowsheet Documentation  Taken 6/24/2025 2000 by Tamra Martins RN  Sensory Stimulation Regulation: care clustered  Sleep/Rest Enhancement:   family presence promoted   natural light exposure provided   consistent schedule promoted  Medication Review/Management: medications reviewed   Goal Outcome Evaluation:  Plan of Care Reviewed With: patient, spouse        Progress: no change

## 2025-06-25 NOTE — ANESTHESIA PROCEDURE NOTES
Airway  Reason: elective    Date/Time: 6/25/2025 12:35 PM  Airway not difficult    General Information and Staff    Patient location during procedure: OR  CRNA/CAA: Annetta Early CRNA    Indications and Patient Condition  Indications for airway management: airway protection    Preoxygenated: yes  MILS not maintained throughout    Mask difficulty assessment: 1 - vent by mask    Final Airway Details    Final airway type: endotracheal airway      Successful airway: ETT  Cuffed: yes   Successful intubation technique: video laryngoscopy  Endotracheal tube insertion site: oral  Blade: Zimmerman  Blade size: 3  ETT size (mm): 7.0  Cormack-Lehane Classification: grade I - full view of glottis  Placement verified by: chest auscultation and capnometry   Measured from: lips  ETT/EBT  to lips (cm): 21  Number of attempts at approach: 1  Assessment: lips, teeth, and gum same as pre-op and atraumatic intubation    Additional Comments  Negative epigastric sounds, Breath sound equal bilaterally with symmetric chest rise and fall

## 2025-06-25 NOTE — ANESTHESIA POSTPROCEDURE EVALUATION
Patient: Evelia Ryan    Procedure Summary       Date: 06/25/25 Room / Location:  [facility]    Anesthesia Start: 1221 Anesthesia Stop: 1644    Procedure: CRANIOTOMY FOR TUMOR STEREOTACTIC WITH STEALTH, left occipital (Left: Head) Diagnosis:       Brain mass      (Brain mass [G93.89])    Surgeons: Benito Mitchell MD Provider: Nadir Lara MD    Anesthesia Type: general, Kaya ASA Status: 3            Anesthesia Type: general, Kaya    Vitals  No vitals data found for the desired time range.          Post Anesthesia Care and Evaluation    Patient location during evaluation: PACU  Patient participation: complete - patient participated  Level of consciousness: awake and alert  Pain management: adequate    Airway patency: patent  Anesthetic complications: No anesthetic complications  PONV Status: none  Cardiovascular status: hemodynamically stable and acceptable  Respiratory status: nonlabored ventilation, acceptable and nasal cannula  Hydration status: acceptable

## 2025-06-25 NOTE — ANESTHESIA PREPROCEDURE EVALUATION
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   Substance History - negative use     OB/GYN negative ob/gyn ROS         Other                      Anesthesia Plan    ASA 3     general and Kaya     intravenous induction     Anesthetic plan, risks, benefits, and alternatives have been provided, discussed and informed consent has been obtained with: patient.    Plan discussed with CRNA.    CODE STATUS:    Code Status (Patient has no pulse and is not breathing): CPR (Attempt to Resuscitate)  Medical Interventions (Patient has pulse or is breathing): Full Support  Level Of Support Discussed With: Patient

## 2025-06-25 NOTE — ANESTHESIA PROCEDURE NOTES
Arterial Line      Patient reassessed immediately prior to procedure    Patient location during procedure: pre-op   Line placed for hemodynamic monitoring.  Performed By   Anesthesiologist: Montana Recinos MD   Preanesthetic Checklist  Completed: patient identified, IV checked, site marked, risks and benefits discussed, surgical consent, monitors and equipment checked, pre-op evaluation and timeout performed  Arterial Line Prep    Sterile Tech: cap, gloves and sterile barriers  Prep: ChloraPrep  Patient monitoring: blood pressure monitoring, continuous pulse oximetry and EKG  Arterial Line Procedure   Laterality:right  Location:  radial artery  Catheter size: 20 G   Guidance: ultrasound guided  Number of attempts: 1  Successful placement: yes   Post Assessment   Dressing Type: line sutured, occlusive dressing applied, secured with tape and wrist guard applied.   Complications no  Circ/Move/Sens Assessment: normal and unchanged.   Patient Tolerance: patient tolerated the procedure well with no apparent complications

## 2025-06-25 NOTE — PROGRESS NOTES
[facility] Medicine Services  PROGRESS NOTE    Patient Name: Evelia Ryan  : 1975  MRN: [id number]    Date of Admission: 2025  Primary Care Physician: Nina Cheng APRN    Subjective   Subjective     CC:  Headache, brain mass    HPI:  No significant overnight events, plan for surgery today, was n.p.o. at night.      Objective   Objective     Vital Signs:   Temp:  [96.8 °F (36 °C)-97.6 °F (36.4 °C)] 97.1 °F (36.2 °C)  Heart Rate:  [42-75] 50  Resp:  [14-18] 18  BP: (104-155)/() 155/92     Physical Exam:  Constitutional: No acute distress, awake, alert,  and sons at bedside  HENT: NCAT, mucous membranes moist  Respiratory: Clear to auscultation bilaterally, respiratory effort normal   Cardiovascular: RRR, no murmurs, rubs, or gallops  Gastrointestinal: Positive bowel sounds, soft, nontender, nondistended  Musculoskeletal: No bilateral ankle edema  Psychiatric: Appropriate affect, cooperative  Neurologic: Oriented x 3, ROBB, speech clear  Skin: No rashes      Results Reviewed:  LAB RESULTS:      Lab 256 25  0611 25   WBC 9.61 10.11 5.20 6.60   HEMOGLOBIN 15.2 15.4 15.1 14.3   HEMATOCRIT 43.4 43.9 44.0 42.3   PLATELETS 316 338 267 252   NEUTROS ABS  --   --  4.66 5.00   IMMATURE GRANS (ABS)  --   --  0.04 0.02   LYMPHS ABS  --   --  0.45* 1.10   MONOS ABS  --   --  0.04* 0.37   EOS ABS  --   --  0.00 0.07   MCV 91.6 91.6 93.4 93.4   PROTIME 13.5 13.0  --   --          Lab 25  0446 25  0611 25  1116 25   SODIUM 138 136 137 141   POTASSIUM 3.9 3.9 3.9 3.6   CHLORIDE 99 99 100 102   CO2 29.0 22.9 24.0 27.0   ANION GAP 10.0 14.1 13.0 12.0   BUN 17.0 13.7 10.6 10.3   CREATININE 0.58 0.63 0.55* 0.60   EGFR 109.0 106.9 110.4 108.2   GLUCOSE 126* 110* 126* 94   CALCIUM 9.2 9.6 9.9 9.2         Lab 25  1116 25  2023   TOTAL PROTEIN 7.9 6.8   ALBUMIN 4.3 4.2   GLOBULIN 3.6 2.6   ALT  (SGPT) 15 14   AST (SGOT) 16 17   BILIRUBIN 0.5 0.4   ALK PHOS 77 67         Lab 06/25/25  0446 06/24/25  0611   PROTIME 13.5 13.0   INR 0.97 0.93                 Brief Urine Lab Results       None            Microbiology Results Abnormal       None            Invasive peripheral vascular study  Result Date: 6/25/2025  Clinical Indication: 50-year-old female with large left parietal/occipital mass (probable meningioma).  She presents for pre-- operative angiography and arterial/venous mapping, along with embolization. : Dr. Roney Gu. Access: Right radial artery.  Ultrasound was used to identify the right radial artery for access. It was patent, appropriate for catheterization, and used for guidance of the micropuncture. Successful cannulation was achieved and images were saved to PACS for review. Conscious sedation: Moderate sedation was provided by me for a total of 45 minutes.  Physical vitals were continuously monitored by an independently trained observer.  A total of 2 mg of Versed and 100 ug of fentanyl were administered intravenously. Estimated blood loss: Negligible Complication: None apparent. Procedures: 1.  Selective bilateral internal carotid artery catheterization with subsequent diagnostic bilateral extracranial and intracranial carotid angiography. 2.  Selective bilateral external carotid artery catheterization with subsequent diagnostic bilateral external carotid angiography. 3.  Selective right vertebral artery catheterization with subsequent diagnostic right vertebral angiography. 4.  Selective microcatheterization of the left middle meningeal artery (third order left carotid), with microcatheter angiography, for intervention 5.  Intracranial embolization, left middle meningeal artery and left parietal/occipital meningioma 6.  Follow-up angiography after embolization 7.  Ultrasound guided arterial access, right radial artery 8.  Conscious sedation Technique/Findings: Formal written  "consent for the procedure was obtained from the patient/patient's family after explained risks to include bleeding, infection, contrast reaction, vascular injury, and stroke.  The right wrist region was prepped and draped in the usual, sterile fashion.  Local anesthesia with 1% lidocaine infiltration was achieved.  Additionally, intravenous fentanyl and Versed were given for patient comfort throughout the procedure, the details of which are documented in the nursing record.  Utilizing Ultrasound guidance and Seldinger technique, a 6 [place]an vascular sheath was placed into the right radial artery without difficulty.  A radial \"cocktail\" was then administered, per protocol, the details of which are documented in the nursing record.  Subsequently, a 5 [place] Zefanclub 2 catheter and [email or web address] intermediate catheter were advanced into the aortic arch and used to select the bilateral common carotid, bilateral internal carotid, bilateral external carotid, right subclavian, and right vertebral arteries under fluoroscopic guidance.  Appropriate angiographic sequences were filmed following contrast injection.  Selective right vertebral artery catheterization and angiography: Injection of the proximal right subclavian artery demonstrates an unremarkable appearance of the right vertebral artery origin.  The intracranial circulation was opacified via a right vertebral artery injection, demonstrating no aneurysm or vascular malformation.  The calcarine branches of the left posterior cerebral artery are superiorly displaced/draped over the patient's known large left parietal/occipital parasagittal mass.  There is no significant arterial supply to the tumor from the posterior circulation.  No large vessel occlusion.  No changes of vasculitis nor vasospasm.  The dural venous sinuses are patent. Selective right internal carotid artery catheterization and angiography: The cervical portions of the right carotid vasculature " demonstrate no significant plaque formation and no appreciable stenosis utilizing NASCET criteria.  The intracranial circulation was opacified via a right internal carotid injection, demonstrating no aneurysm, vascular malformation, significant stenosis, nor large vessel occlusion.  The distal 1/3 of the superior sagittal sinus is displaced rightward by the patient's known left parietal/occipital mass, but is widely patent. Selective right external carotid artery catheterization and angiography: The right external carotid angiogram demonstrates no dural AV fistula or other vascular abnormality.  There is no appreciable supply to the patient's left parietal/occipital mass from the right external carotid circulation. Selective left internal carotid artery catheterization and carotid angiography: The cervical portions of the left carotid vasculature demonstrate no significant atherosclerotic plaque formation at the carotid bifurcation and no appreciable stenosis utilizing NASCET criteria.  The intracranial circulation was opacified via a left internal carotid injection, demonstrating no aneurysm or vascular malformation.  No significant stenosis or large vessel occlusion.  No changes of vasculitis or vasospasm.  Again noted is displacement of the distal 1/3 of the superior sagittal sinus. Selective left external carotid artery catheterization and angiography: The left external carotid angiogram demonstrates a parasitized/hypertrophied posterior division of the middle meningeal artery, supplying a nearly 5 cm left posterior parietal/occipital hypervascular mass, consistent with a meningioma.  Venous drainage from the mass is predominantly from a large vein draining from the superior aspects of the tumor medially to the adjacent superior sagittal sinus.  Again, the adjacent superior sagittal sinus is displaced, but does not appear to be invaded by the tumor. With the Benchmark intermediate catheter purchased in the left  "external carotid artery origin, a Renegade microcatheter was navigated over a Synchro microwire into the left middle meningeal artery without difficulty.  Microcatheter angiography confirms appropriate placement within the left middle meningeal artery, with again seen left parietal/occipital hypervascular mass.  No vascular anomalies are appreciated.  The left parietal/occipital mass was subsequently embolized with PVA particles ranging in size from 250-350 µm until there was stasis within the middle meningeal artery.  The embolization was completed with placement of 2 Target XL microcoil's.  Follow-up angiography demonstrates stasis and distal occlusion of the left middle meningeal artery with no complicating features. At the end of the procedure, all catheters and sheaths were removed from the wrist and hemostasis was achieved at the puncture site utilizing manual compression and placement of a radial \"TR band\".  The patient tolerated the procedure well without apparent complication.     Impression: Large, nearly 5 cm hypervascular extra-axial mass centered in the left parietal/occipital region, the mass abuts and displaces the distal one third of the superior sagittal sinus, but this does not appear invaded by the tumor.  Arterial supply to the tumor is predominantly via parasitized left middle meningeal arterial branches, and these were pre-- operatively embolized with PVA particles and microcoil's.    MRI Brain With Contrast  Result Date: 6/25/2025  MRI BRAIN W CONTRAST Date of Exam: 6/25/2025 1:00 AM EDT Indication: surgical planning, brain mass.  Comparison: 6/18/2025. Technique:  Routine multiplanar/multisequence sequence images of the brain were obtained after the uneventful administration of 40 mL Multihance.  Findings: Limited sequence operative planning exam including thin cut postcontrast sequences redemonstrates a homogeneously enhancing 5.3 x 4.4 cm mass of the left occipital lobe, abutting and likely " adherent to the adjacent inferior aspect of the sagittal sinus and straight sinus which appears somewhat narrowed, without definite steven invasion or occlusion. Surrounding mild mass effect is unchanged. There is no unexpected intracranial hemorrhage, additional mass or new mass effect. The ventricles are unchanged.  The orbits appear normal. The paranasal sinuses are clear.     Impression: Impression: Stable size and appearance of previously noted 5.3 x 4.4 cm avidly enhancing, partially necrotic and calcified mass of the left occipital lobe, favoring meningioma. Electronically Signed: Hieu Jimenez MD  6/25/2025 6:12 AM EDT  Workstation ID: [id number]            Current medications:  Scheduled Meds:ceFAZolin, 2 g, Intravenous, Once  dexAMETHasone, 10 mg, Intravenous, Once  dexAMETHasone, 4 mg, Intravenous, Q6H  melatonin, 10 mg, Oral, Nightly  sodium chloride, 10 mL, Intravenous, Q12H      Continuous Infusions:   PRN Meds:.  acetaminophen **OR** acetaminophen **OR** acetaminophen    senna-docusate sodium **AND** polyethylene glycol **AND** bisacodyl **AND** bisacodyl    HYDROcodone-acetaminophen    Morphine    nitroglycerin    ondansetron    [COMPLETED] Insert Peripheral IV **AND** sodium chloride    sodium chloride    temazepam    Assessment & Plan   Assessment & Plan     Active Hospital Problems    Diagnosis  POA    **Brain mass [G93.89]  Yes    Anxiety and depression [F41.9, F32.A]  Yes      Resolved Hospital Problems   No resolved problems to display.        Brief Hospital Course to date:  Evelia Ryan is a 50 y.o. female  that for the last few weeks she has noticed increasing frequency of global headaches, occasional dizziness, increased fatigue, bilateral hand pain, and especially vision changes. She states her vision has become more more blurry especially over the last couple of weeks. Her headache became worse over the last 24 hours, with home/over-the-counter agents not helping. She confirms nausea  "with the headaches as well but no emesis. She also states that she has sometimes noticed when lying in bed certain ways, she will have obvious increase in headache specifically in the occipital region. She states that she \"knew that was probably not good,\" so she went to see her PCP who ordered outpatient imaging, and she states she was called and told that this revealed a occipital brain mass, and that she was instructed to come to the ED      Brain Mass, likely Meningioma  MRI confirms; Left occipital convexity extra-axial appearing mass measuring up to 6.2 cm, most likely a partially calcified meningioma. The mass abuts and likely invades the adjacent superior sagittal sinus which otherwise remains patent. Mass effect on the left occipital lobe with narrowing of the left lateral ventricle and without significant herniation or midline shift.  continue Decadron  neurosurgery following.  Recs 72 hours of high dose steroids.  Hold on keppra for now since no seizure like activity.  S/p angiogram/embolization today with Dr. Gu to help map out blood supply to mengioma.  Repeat MRI brain with and without with fiducials today.  Plans for surgery for craniotomy with Dr. Mitchell today  neuro checks  NS increased lortab from 5mg to 7.5mg prn to help with headaches  PT/OT  Recommend switch to PPI if patient to get p.o. steroids at DC     Anxiety  prn ativan    Insomnia  likely d/t steroids.  Increased restoril to 30mg PRN    Expected Discharge Location and Transportation: D  Expected Discharge   Expected Discharge Date: 6/28/2025; Expected Discharge Time:      VTE Prophylaxis:  Mechanical VTE prophylaxis orders are present.     Total time spent: Time Spent: Time Spent: 40 minutes  Time spent includes time reviewing chart, face-to-face time, counseling patient/family/caregiver, ordering medications/tests/procedures, communicating with other health care professionals, documenting clinical information in the electronic " health record, and coordination of care.      AM-PAC 6 Clicks Score (PT): 24 (06/24/25 2000)    CODE STATUS:   Code Status and Medical Interventions: CPR (Attempt to Resuscitate); Full Support   Ordered at: 06/18/25 4182     Code Status (Patient has no pulse and is not breathing):    CPR (Attempt to Resuscitate)     Medical Interventions (Patient has pulse or is breathing):    Full Support     Level Of Support Discussed With:    Patient       Brain Mancia MD  06/25/25

## 2025-06-25 NOTE — CASE MANAGEMENT/SOCIAL WORK
Continued Stay Note   [facility]     Patient Name: Evelia Ryan  MRN: [id number]  Today's Date: 6/25/2025    Admit Date: 6/18/2025    Plan: Update   Discharge Plan       Row Name 06/25/25 1033       Plan    Plan Update    Patient/Family in Agreement with Plan unable to assess    Plan Comments Patient gone to the OR today for a crani and removal of brain tumor. CM following.                   Discharge Codes    No documentation.                 Expected Discharge Date and Time       Expected Discharge Date Expected Discharge Time    Jun 28, 2025               Kena Farley RN

## 2025-06-25 NOTE — OP NOTE
Preoperative diagnosis: Left parieto-occipital brain mass  Postoperative diagnosis: Same    Procedures performed:  1.  Left occipital craniotomy for resection of brain tumor.  2.  Use of intraoperative navigation for intradural target    Estimated blood loss: 200 cc    Findings: Intraoperative frozen sections consistent with meningioma    Procedure in detail: The patient was notified in the preoperative holding area and brought to the operating suite where she underwent the uneventful induction general, endotracheal anesthesia.  Venodyne's and a Espinal catheter were placed by the nursing staff.  An arterial line was placed by the attending anesthesiologist.    The patient's head was placed in the Robertson head jenkins with 3 points of fixation.  She was subsequently rotated to the prone position on gel rolls.  Pressure points were inspected, and appropriately padded.  The Robertson was affixed to the bed.  The patient's head was registered with the navigation system.  The skin overlying the tumor was then marked out and subsequently shaved, prepped, and draped in the usual, sterile fashion.    A timeout was performed.  Intravenous antibiotics were administered.  A U-shaped incision with the base situated inferiorly was then made with a #15 blade.  Hemostasis was achieved using bipolar electrocautery and hemostatic Ricardo clips.  A myocutaneous flap was reflected inferiorly.  Bur holes were placed on either side of the sagittal sinus medially.  A craniotomy was then turned in the standard fashion.  Oozing from the sinus was controlled using thrombin foam and Avitene.    The dura was coagulated and opened in a U-shaped incision with the base oriented towards the sagittal sinus.  I immediately encountered abnormal tissue.  This was sent off the field to pathology.  Frozen sections were consistent with meningioma.  I found that it was most easy to develop the plane between the tumor and the overlying cortex by working  superiorly and laterally.  The capsule of the tumor that was visible was coagulated, and then I used the ultrasonic aspirator to begin to debulk it centrally.  After satisfactorily debulking the central portion of the tumor, I was then able to fold the tumor and on itself, working laterally and superiorly.  As I worked medially, I was able to identify the dural attachment.  This was coagulated and sharply divided.  I was able to then visualize the medial aspect of the tumor and saw that it was invading the deep portion of the sagittal sinus.  I elected to leave a small rind of tumor along the lateral wall of the superior sagittal sinus.  I then was able to work deep to the tumor and removed 3 pieces using the bipolar and sharply dividing them from the rest of the tumor.  The inferior aspect of the tumor was heavily calcified, and I was able to gently roll this into the central portion of the cavity.    The surgical cavity was carefully inspected and hemostasis was excellent.  The cavity was lined with Surgicel.  The dura was reapproximated using 4-0 Nurolon sutures.  DuraGen was applied over the suture line.  The bone flap was reattached using the plating system.  A 7 flat RONI drain was left in the subfascial space and tunneled out through separate stab incision.  The galea and skin were closed in a single layer.  Glue and a sterile dressing were then applied.    Sponge, instrument, and needle counts were all correct at the conclusion of the case.    Jaleesa Carlson, physician assistant was responsible for performing the following activities: Retraction, Suction, Irrigation, Suturing and Closing and their skilled assistance was necessary for the success of this case.

## 2025-06-25 NOTE — PROGRESS NOTES
"INTENSIVIST NOTE     SUBJECTIVE     Ms. Ryan is a 50 year old female with pmhx of insomnia and anxiety who presented to [facility] on 6/18 after being found to have an occipital brain mass with mild mass effect on the posterior falx and posterior horn left lateral ventricle and mild edema. Patient was admitted to medicine team and started on steroids. Neurosurgery was consulted and followed throughout admission. On 6/24, patient underwent angiogram with emobolization with PVA particles and microcoils of the large left meningioma.   On 6/25, patient was taken to the OR where she underwent left occipital craniotomy for resection of brain tumor. Post-operatively, patient was transferred to the ICU for higher level of care. Upon arrival to the ICU, patient is in pain (10/10). She denies blurry vision, lightheadedness, dizziness, weakness.      The patient's relevant past medical, surgical and social history were reviewed and updated in Epic as appropriate.       OBJECTIVE     Vital Sign Min/Max for last 24 hours  Temp  Min: 96.8 °F (36 °C)  Max: 98 °F (36.7 °C)   BP  Min: 103/54  Max: 165/95   Pulse  Min: 48  Max: 84   Resp  Min: 14  Max: 18   SpO2  Min: 95 %  Max: 100 %   No data recorded   No data recorded      Intake/Output Summary (Last 24 hours) at 6/25/2025 1805  Last data filed at 6/25/2025 1727  Gross per 24 hour   Intake 2250 ml   Output 3850 ml   Net -1600 ml      Flowsheet Rows      Flowsheet Row First Filed Value   Admission Height 177.8 cm (70\") Documented at 06/18/2025 1926   Admission Weight 95.3 kg (210 lb) Documented at 06/18/2025 1926               06/18/25 1926   Weight: 95.3 kg (210 lb)            Objective:  General Appearance:  Comfortable, well-appearing and in pain.    Vital signs: (most recent): Blood pressure 114/65, pulse 76, temperature 98 °F (36.7 °C), temperature source Temporal, resp. rate 15, height 177.8 cm (70\"), weight 95.3 kg (210 lb), SpO2 96%.  Vital signs are normal.    Lungs:  Normal " effort and normal respiratory rate.  Breath sounds clear to auscultation.    Heart: Normal rate.  Regular rhythm.  S1 normal and S2 normal.    Abdomen: Abdomen is soft.  Bowel sounds are normal.     Pulses: Distal pulses are intact.    Neurological: Patient is alert.  Normal strength.    Skin:  Warm and dry.  (Crani site- dressing c/d/I. RONI drain with bloody output. )              I reviewed the patient's new clinical results.  I reviewed the patient's new imaging results/reports including actual images and agree with reports.    INFUSIONS  [START ON 6/26/2025] lactated ringers, 9 mL/hr, Last Rate: 9 mL/hr (06/25/25 1052)  lactated ringers, 9 mL/hr  niCARdipine, 5-15 mg/hr        Results from last 7 days   Lab Units 06/25/25  0446 06/24/25  0611 06/19/25  1116   WBC 10*3/mm3 9.61 10.11 5.20   HEMOGLOBIN g/dL 15.2 15.4 15.1   HEMATOCRIT % 43.4 43.9 44.0   PLATELETS 10*3/mm3 316 338 267     Results from last 7 days   Lab Units 06/25/25  0446 06/24/25  0611 06/19/25  1116 06/18/25 2023   SODIUM mmol/L 138 136 137 141   POTASSIUM mmol/L 3.9 3.9 3.9 3.6   CHLORIDE mmol/L 99 99 100 102   CO2 mmol/L 29.0 22.9 24.0 27.0   BUN mg/dL 17.0 13.7 10.6 10.3   GLUCOSE mg/dL 126* 110* 126* 94   CREATININE mg/dL 0.58 0.63 0.55* 0.60   CALCIUM mg/dL 9.2 9.6 9.9 9.2   ALBUMIN g/dL  --   --  4.3 4.2       I reviewed the patient's medications.    Assessment & Plan   ASSESSMENT      Active Hospital Problems    Diagnosis     **Brain mass     Anxiety and depression            PLAN     Brain mass, s/p embolization of large left occipital meningioma 6/24 and left occipital crani/resection of brain tumor 6/25: post-op care per neurosurgery. Await post-op imaging ordered per neurosurgery. Continue Decadron. PRN Cardene to maintain SBP < 140 mmHg  Anxiety  Insomnia: resume temazepam as needed if okay with neurosurgery.         I discussed the patient's findings and my recommendations with patient     Plan of care and goals reviewed with  multidisciplinary team at daily rounds.    DAVID Greer  Pulmonary and Critical Care Medicine  06/25/25 18:05 EDT

## 2025-06-26 ENCOUNTER — APPOINTMENT (OUTPATIENT)
Dept: MRI IMAGING | Facility: HOSPITAL | Age: 50
End: 2025-06-26
Payer: COMMERCIAL

## 2025-06-26 LAB
ANION GAP SERPL CALCULATED.3IONS-SCNC: 12.1 MMOL/L (ref 5–15)
BASOPHILS # BLD AUTO: 0.01 10*3/MM3 (ref 0–0.2)
BASOPHILS NFR BLD AUTO: 0.1 % (ref 0–1.5)
BUN SERPL-MCNC: 13.7 MG/DL (ref 6–20)
BUN/CREAT SERPL: 23.2 (ref 7–25)
CALCIUM SPEC-SCNC: 8.8 MG/DL (ref 8.6–10.5)
CHLORIDE SERPL-SCNC: 97 MMOL/L (ref 98–107)
CO2 SERPL-SCNC: 27.9 MMOL/L (ref 22–29)
CREAT SERPL-MCNC: 0.59 MG/DL (ref 0.57–1)
DEPRECATED RDW RBC AUTO: 40.9 FL (ref 37–54)
EGFRCR SERPLBLD CKD-EPI 2021: 108.6 ML/MIN/1.73
EOSINOPHIL # BLD AUTO: 0.01 10*3/MM3 (ref 0–0.4)
EOSINOPHIL NFR BLD AUTO: 0.1 % (ref 0.3–6.2)
ERYTHROCYTE [DISTWIDTH] IN BLOOD BY AUTOMATED COUNT: 12.1 % (ref 12.3–15.4)
GLUCOSE SERPL-MCNC: 151 MG/DL (ref 65–99)
HCT VFR BLD AUTO: 38.6 % (ref 34–46.6)
HGB BLD-MCNC: 13.3 G/DL (ref 12–15.9)
IMM GRANULOCYTES # BLD AUTO: 0.17 10*3/MM3 (ref 0–0.05)
IMM GRANULOCYTES NFR BLD AUTO: 1.1 % (ref 0–0.5)
LYMPHOCYTES # BLD AUTO: 0.39 10*3/MM3 (ref 0.7–3.1)
LYMPHOCYTES NFR BLD AUTO: 2.5 % (ref 19.6–45.3)
MAGNESIUM SERPL-MCNC: 2.1 MG/DL (ref 1.6–2.6)
MCH RBC QN AUTO: 31.8 PG (ref 26.6–33)
MCHC RBC AUTO-ENTMCNC: 34.5 G/DL (ref 31.5–35.7)
MCV RBC AUTO: 92.3 FL (ref 79–97)
MONOCYTES # BLD AUTO: 0.93 10*3/MM3 (ref 0.1–0.9)
MONOCYTES NFR BLD AUTO: 5.9 % (ref 5–12)
NEUTROPHILS NFR BLD AUTO: 14.18 10*3/MM3 (ref 1.7–7)
NEUTROPHILS NFR BLD AUTO: 90.3 % (ref 42.7–76)
NRBC BLD AUTO-RTO: 0 /100 WBC (ref 0–0.2)
PHOSPHATE SERPL-MCNC: 3.4 MG/DL (ref 2.5–4.5)
PLATELET # BLD AUTO: 296 10*3/MM3 (ref 140–450)
PMV BLD AUTO: 9.9 FL (ref 6–12)
POTASSIUM SERPL-SCNC: 3.8 MMOL/L (ref 3.5–5.2)
RBC # BLD AUTO: 4.18 10*6/MM3 (ref 3.77–5.28)
SODIUM SERPL-SCNC: 137 MMOL/L (ref 136–145)
WBC NRBC COR # BLD AUTO: 15.69 10*3/MM3 (ref 3.4–10.8)

## 2025-06-26 PROCEDURE — 99232 SBSQ HOSP IP/OBS MODERATE 35: CPT | Performed by: NURSE PRACTITIONER

## 2025-06-26 PROCEDURE — 70553 MRI BRAIN STEM W/O & W/DYE: CPT

## 2025-06-26 PROCEDURE — 25010000002 DEXAMETHASONE PER 1 MG: Performed by: NEUROLOGICAL SURGERY

## 2025-06-26 PROCEDURE — A9577 INJ MULTIHANCE: HCPCS | Performed by: INTERNAL MEDICINE

## 2025-06-26 PROCEDURE — 85025 COMPLETE CBC W/AUTO DIFF WBC: CPT | Performed by: NEUROLOGICAL SURGERY

## 2025-06-26 PROCEDURE — 97166 OT EVAL MOD COMPLEX 45 MIN: CPT

## 2025-06-26 PROCEDURE — 25010000002 CEFAZOLIN PER 500 MG: Performed by: NEUROLOGICAL SURGERY

## 2025-06-26 PROCEDURE — 25510000002 GADOBENATE DIMEGLUMINE 529 MG/ML SOLUTION: Performed by: INTERNAL MEDICINE

## 2025-06-26 PROCEDURE — 80048 BASIC METABOLIC PNL TOTAL CA: CPT | Performed by: NEUROLOGICAL SURGERY

## 2025-06-26 PROCEDURE — 84100 ASSAY OF PHOSPHORUS: CPT | Performed by: NURSE PRACTITIONER

## 2025-06-26 PROCEDURE — 97162 PT EVAL MOD COMPLEX 30 MIN: CPT

## 2025-06-26 PROCEDURE — 83735 ASSAY OF MAGNESIUM: CPT | Performed by: NURSE PRACTITIONER

## 2025-06-26 RX ORDER — DIAZEPAM 5 MG/1
5 TABLET ORAL 3 TIMES DAILY
Status: DISCONTINUED | OUTPATIENT
Start: 2025-06-26 | End: 2025-06-28 | Stop reason: HOSPADM

## 2025-06-26 RX ORDER — HYDROMORPHONE HYDROCHLORIDE 1 MG/ML
0.25 INJECTION, SOLUTION INTRAMUSCULAR; INTRAVENOUS; SUBCUTANEOUS ONCE AS NEEDED
Status: DISCONTINUED | OUTPATIENT
Start: 2025-06-26 | End: 2025-06-28 | Stop reason: HOSPADM

## 2025-06-26 RX ORDER — IBUPROFEN 600 MG/1
600 TABLET, FILM COATED ORAL EVERY 8 HOURS PRN
Status: DISCONTINUED | OUTPATIENT
Start: 2025-06-26 | End: 2025-06-28 | Stop reason: HOSPADM

## 2025-06-26 RX ORDER — LEVETIRACETAM 500 MG/1
500 TABLET ORAL 2 TIMES DAILY
Status: DISCONTINUED | OUTPATIENT
Start: 2025-06-26 | End: 2025-06-28 | Stop reason: HOSPADM

## 2025-06-26 RX ADMIN — ACETAMINOPHEN 650 MG: 325 TABLET ORAL at 16:56

## 2025-06-26 RX ADMIN — MUPIROCIN 1 APPLICATION: 20 OINTMENT TOPICAL at 20:47

## 2025-06-26 RX ADMIN — LEVETIRACETAM 500 MG: 500 TABLET, FILM COATED ORAL at 20:47

## 2025-06-26 RX ADMIN — Medication 10 ML: at 10:36

## 2025-06-26 RX ADMIN — ACETAMINOPHEN 650 MG: 325 TABLET ORAL at 10:35

## 2025-06-26 RX ADMIN — HYDROCODONE BITARTRATE AND ACETAMINOPHEN 1 TABLET: 7.5; 325 TABLET ORAL at 04:07

## 2025-06-26 RX ADMIN — DIAZEPAM 5 MG: 5 TABLET ORAL at 14:38

## 2025-06-26 RX ADMIN — GADOBENATE DIMEGLUMINE 20 ML: 529 INJECTION, SOLUTION INTRAVENOUS at 03:21

## 2025-06-26 RX ADMIN — DEXAMETHASONE SODIUM PHOSPHATE 4 MG: 4 INJECTION, SOLUTION INTRA-ARTICULAR; INTRALESIONAL; INTRAMUSCULAR; INTRAVENOUS; SOFT TISSUE at 06:11

## 2025-06-26 RX ADMIN — HYDROCODONE BITARTRATE AND ACETAMINOPHEN 1 TABLET: 7.5; 325 TABLET ORAL at 13:08

## 2025-06-26 RX ADMIN — HYDROCODONE BITARTRATE AND ACETAMINOPHEN 1 TABLET: 7.5; 325 TABLET ORAL at 18:20

## 2025-06-26 RX ADMIN — MUPIROCIN 1 APPLICATION: 20 OINTMENT TOPICAL at 09:12

## 2025-06-26 RX ADMIN — HYDROCODONE BITARTRATE AND ACETAMINOPHEN 1 TABLET: 7.5; 325 TABLET ORAL at 22:29

## 2025-06-26 RX ADMIN — DEXAMETHASONE SODIUM PHOSPHATE 4 MG: 4 INJECTION, SOLUTION INTRA-ARTICULAR; INTRALESIONAL; INTRAMUSCULAR; INTRAVENOUS; SOFT TISSUE at 17:00

## 2025-06-26 RX ADMIN — DIAZEPAM 5 MG: 5 TABLET ORAL at 09:12

## 2025-06-26 RX ADMIN — DEXAMETHASONE SODIUM PHOSPHATE 4 MG: 4 INJECTION, SOLUTION INTRA-ARTICULAR; INTRALESIONAL; INTRAMUSCULAR; INTRAVENOUS; SOFT TISSUE at 11:46

## 2025-06-26 RX ADMIN — Medication 10 ML: at 10:35

## 2025-06-26 RX ADMIN — LEVETIRACETAM 500 MG: 500 TABLET, FILM COATED ORAL at 10:35

## 2025-06-26 RX ADMIN — Medication 10 ML: at 20:50

## 2025-06-26 RX ADMIN — DEXAMETHASONE SODIUM PHOSPHATE 4 MG: 4 INJECTION, SOLUTION INTRA-ARTICULAR; INTRALESIONAL; INTRAMUSCULAR; INTRAVENOUS; SOFT TISSUE at 00:09

## 2025-06-26 RX ADMIN — IBUPROFEN 600 MG: 600 TABLET, FILM COATED ORAL at 20:47

## 2025-06-26 RX ADMIN — HYDROCODONE BITARTRATE AND ACETAMINOPHEN 1 TABLET: 7.5; 325 TABLET ORAL at 09:12

## 2025-06-26 RX ADMIN — DIAZEPAM 5 MG: 5 TABLET ORAL at 20:47

## 2025-06-26 RX ADMIN — ACETAMINOPHEN 650 MG: 325 TABLET ORAL at 04:08

## 2025-06-26 RX ADMIN — SODIUM CHLORIDE 2000 MG: 900 INJECTION INTRAVENOUS at 06:11

## 2025-06-26 NOTE — THERAPY EVALUATION
Patient Name: Evelia Ryan  : 1975    MRN: [id number]                              Today's Date: 2025       Admit Date: 2025    Visit Dx:     ICD-10-CM ICD-9-CM   1. Brain mass  G93.89 348.89     Patient Active Problem List   Diagnosis    Anxiety and depression    Brain mass     Past Medical History:   Diagnosis Date    Anxiety and depression 2014     Past Surgical History:   Procedure Laterality Date    APPENDECTOMY      CEREBRAL ANGIOGRAM N/A 2025    Procedure: Cerebral angiogram with possible embolization - Right radial access;  Surgeon: Roney Gu MD;  Location: [facility];  Service: Interventional Radiology;  Laterality: N/A;    CHOLECYSTECTOMY      CRANIOTOMY FOR TUMOR Left 2025    Procedure: CRANIOTOMY FOR TUMOR STEREOTACTIC WITH STEALTH, LEFT OCCIPITAL;  Surgeon: Benito Mitchell MD;  Location: [facility] OR;  Service: Neurosurgery;  Laterality: Left;    GASTRIC SLEEVE LAPAROSCOPIC      HYSTERECTOMY        General Information       Row Name 25 1148          Physical Therapy Time and Intention    Document Type evaluation  -TT     Mode of Treatment physical therapy;co-treatment  -TT       Row Name 25 1148          General Information    Patient Profile Reviewed yes  -TT     Prior Level of Function independent:;all household mobility;community mobility;ADL's;home management  -TT     Existing Precautions/Restrictions fall  RONI X 1  -TT     Barriers to Rehab medically complex  -TT       Row Name 25 1148          Living Environment    Current Living Arrangements home  -TT     People in Home spouse;child(cici), adult  -TT       Row Name 25 1148          Home Main Entrance    Number of Stairs, Main Entrance none  -TT       Row Name 25 1148          Stairs Within Home, Primary    Stairs, Within Home, Primary All needs met on main floor  -TT     Number of Stairs, Within Home, Primary other (see comments)  -TT       Row Name  06/26/25 1148          Cognition    Orientation Status (Cognition) oriented x 3  -TT       Row Name 06/26/25 1148          Safety Issues/Impairments Affecting Functional Mobility    Safety Issues Affecting Function (Mobility) awareness of need for assistance;insight into deficits/self-awareness;safety precaution awareness;safety precautions follow-through/compliance;sequencing abilities  -TT     Impairments Affecting Function (Mobility) endurance/activity tolerance;pain;strength;visual/perceptual  -TT               User Key  (r) = Recorded By, (t) = Taken By, (c) = Cosigned By      Initials Name Provider Type    TT Gabriela Mendoza, PT Physical Therapist                   Mobility       Row Name 06/26/25 1154 06/26/25 1149       Bed Mobility    Bed Mobility supine-sit;sit-supine  -TT --    Supine-Sit-Supine [place] (Bed Mobility) standby assist;verbal cues  -TT standby assist;verbal cues  -TT    Assistive Device (Bed Mobility) bed rails;head of bed elevated  -TT bed rails;head of bed elevated  -TT    Comment, (Bed Mobility) Cues for appropriate sequencing and energy conservation. Increased time d/t slowed pacing; denied onset of dizziness w/ positional change.  -TT Cues for appropriate sequencing and energy conservation. Increased time d/t slowed pacing; denied onset of dizziness w/ positional change.  -TT      Row Name 06/26/25 1149          Transfers    Comment, (Transfers) 2 STS performed w/ UE support; 1 from EOB and 1 from toilet. Increased time to allow use of restroom.  -TT       Row Name 06/26/25 1154 06/26/25 1149       Sit-Stand Transfer    Sit-Stand [place] (Transfers) standby assist  -TT --    Comment, (Sit-Stand Transfer) Brief cues for positioning BLE under FER and increased anterior weight shift. Good carryover to cues observed on subsequent attempt; continued to deny dizziness w/ positional change  -TT Brief cues for positioning BLE under FER and increased anterior weight shift. Good  carryover to cues observed on subsequent attempt; continued to deny dizziness w/ positional change.  -TT      Row Name 06/26/25 1154 06/26/25 1149       Gait/Stairs (Locomotion)    [place] Level (Gait) contact guard;1 person assist  -TT contact guard;1 person assist  -TT    Patient was able to Ambulate -- yes  -TT    Distance in Feet (Gait) -- 300  -TT    Deviations/Abnormal Patterns (Gait) -- bilateral deviations;base of support, narrow;karli decreased;gait speed decreased;stride length decreased  -TT    Bilateral Gait Deviations -- decreased arm swing;forward flexed posture;heel strike decreased  -TT    Comment, (Gait/Stairs) -- PT providing cues for upright posture, resting BUE at sides and increased b/l step length as able to tolerate. Continue to demonstrating BUE anterior to FER in semi-guarded posture w/ activity. Mild increase in weight shift over LE in stance phase noted w/ progressing of activity requiring CGA for improved safety, however, no LOB observed. Pt noting mild fatigue upon completion.  -TT              User Key  (r) = Recorded By, (t) = Taken By, (c) = Cosigned By      Initials Name Provider Type    TT Gabriela Mendoza, PT Physical Therapist                   Obj/Interventions       Row Name 06/26/25 1155          Range of Motion Comprehensive    General Range of Motion bilateral lower extremity ROM WFL  -TT       Row Name 06/26/25 1157          Strength Comprehensive (MMT)    General Manual Muscle Testing (MMT) Assessment lower extremity strength deficits identified  -TT     Comment, General Manual Muscle Testing (MMT) Assessment BLE grossly 4/5  -TT       Row Name 06/26/25 1156          Balance    Balance Assessment sitting static balance;sitting dynamic balance;standing static balance;standing dynamic balance  -TT     Static Sitting Balance standby assist  -TT     Dynamic Sitting Balance standby assist  -TT     Position, Sitting Balance unsupported;sitting edge of bed  -TT     Static  Standing Balance standby assist  -TT     Dynamic Standing Balance contact guard;verbal cues  -TT     Position/Device Used, Standing Balance unsupported  -TT     Balance Interventions sitting;standing;sit to stand;supported;static;dynamic;dynamic reaching;occupation based/functional task;weight shifting activity  -TT       Row Name 06/26/25 1155          Sensory Assessment (Somatosensory)    Sensory Assessment (Somatosensory) LE sensation intact  -TT               User Key  (r) = Recorded By, (t) = Taken By, (c) = Cosigned By      Initials Name Provider Type    TT Gabriela Mendoza, PT Physical Therapist                   Goals/Plan       Row Name 06/26/25 1159          Bed Mobility Goal 1 (PT)    Activity/Assistive Device (Bed Mobility Goal 1, PT) sit to supine/supine to sit  -TT     [place] Level/Cues Needed (Bed Mobility Goal 1, PT) independent  -TT     Time Frame (Bed Mobility Goal 1, PT) short term goal (STG);2 days  -TT     Progress/Outcomes (Bed Mobility Goal 1, PT) new goal  -TT       Row Name 06/26/25 1159          Transfer Goal 1 (PT)    Activity/Assistive Device (Transfer Goal 1, PT) sit-to-stand/stand-to-sit;bed-to-chair/chair-to-bed  -TT     [place] Level/Cues Needed (Transfer Goal 1, PT) independent  -TT     Time Frame (Transfer Goal 1, PT) long term goal (LTG);1 week  -TT     Progress/Outcome (Transfer Goal 1, PT) new goal  -TT       Row Name 06/26/25 1159          Gait Training Goal 1 (PT)    Activity/Assistive Device (Gait Training Goal 1, PT) gait (walking locomotion);improve balance and speed;increase endurance/gait distance;decrease fall risk  -TT     [place] Level (Gait Training Goal 1, PT) independent  -TT     Distance (Gait Training Goal 1, PT) 600ft  -TT     Time Frame (Gait Training Goal 1, PT) long term goal (LTG);1 week  -TT     Progress/Outcome (Gait Training Goal 1, PT) new goal  -TT       Row Name 06/26/25 1159          Stairs Goal 1 (PT)    Activity/Assistive Device  (Stairs Goal 1, PT) ascending stairs;descending stairs  -TT     [place] Level/Cues Needed (Stairs Goal 1, PT) standby assist  -TT     Number of Stairs (Stairs Goal 1, PT) 2  -TT     Time Frame (Stairs Goal 1, PT) long term goal (LTG);1 week  -TT       Row Name 06/26/25 1159          Therapy Assessment/Plan (PT)    Planned Therapy Interventions (PT) balance training;bed mobility training;home exercise program;gait training;patient/family education;postural re-education;ROM (range of motion);stair training;strengthening;stretching;transfer training  -TT               User Key  (r) = Recorded By, (t) = Taken By, (c) = Cosigned By      Initials Name Provider Type    TT Gabriela Mendoza, PT Physical Therapist                   Clinical Impression       Row Name 06/26/25 1155          Pain    Pretreatment Pain Rating 9/10  -TT     Posttreatment Pain Rating 9/10  -TT     Pain Location head  -TT     Pain Side/Orientation generalized  -TT     Pain Management Interventions activity modification encouraged;exercise or physical activity utilized;positioning techniques utilized;nursing notified  -TT     Response to Pain Interventions activity participation with tolerable pain  -TT       Row Name 06/26/25 1150          Plan of Care Review    Plan of Care Reviewed With patient  -TT     Outcome Evaluation PT initial evaluation completed. Pt demonstrating mild decrease in functional endurance, balance deficits, generalized weakness and increased falls risk compared to reported baseline warranting continued skilled IP PT interventions to promote maximal IND w/ functional mobility. SBA to EOB and stand, CGA to ambulate in hallway. PT recommending home w/ assist upon d/c based on current presentation.  -TT       Row Name 06/26/25 1159          Therapy Assessment/Plan (PT)    Patient/Family Therapy Goals Statement (PT) Return home  -TT     Rehab Potential (PT) good  -TT     Criteria for Skilled Interventions Met (PT) yes;meets  criteria;skilled treatment is necessary  -TT     Therapy Frequency (PT) daily  -TT     Predicted Duration of Therapy Intervention (PT) 1 week  -TT       Row Name 06/26/25 1156          Vital Signs    Pre Systolic BP Rehab 129  -TT     Pre Treatment Diastolic BP 77  -TT     Post Systolic BP Rehab 120  -TT     Post Treatment Diastolic BP 85  -TT     Pretreatment Heart Rate (beats/min) 57  -TT     Posttreatment Heart Rate (beats/min) 54  -TT     Pre SpO2 (%) 96  -TT     O2 Delivery Pre Treatment room air  -TT     O2 Delivery Intra Treatment room air  -TT     Post SpO2 (%) 95  -TT     O2 Delivery Post Treatment room air  -TT     Pre Patient Position Supine  -TT     Intra Patient Position Standing  -TT     Post Patient Position Supine  -TT       Row Name 06/26/25 1156          Positioning and Restraints    Pre-Treatment Position in bed  -TT     Post Treatment Position bed  -TT     In Bed notified nsg;call light within reach;encouraged to call for assist;side rails up x3;with nsg  -TT               User Key  (r) = Recorded By, (t) = Taken By, (c) = Cosigned By      Initials Name Provider Type    TT Gabriela Mendoza, PT Physical Therapist                   Outcome Measures       Row Name 06/26/25 1200 06/26/25 0800       How much help from another person do you currently need...    Turning from your back to your side while in flat bed without using bedrails? 4  -TT 4  -SW    Moving from lying on back to sitting on the side of a flat bed without bedrails? 4  -TT 4  -SW    Moving to and from a bed to a chair (including a wheelchair)? 3  -TT 3  -SW    Standing up from a chair using your arms (e.g., wheelchair, bedside chair)? 3  -TT 3  -SW    Climbing 3-5 steps with a railing? 3  -TT 3  -SW    To walk in hospital room? 3  -TT 3  -SW    AM-PAC 6 Clicks Score (PT) 20  -TT 20  -SW    Highest Level of Mobility Goal Walk 10 Steps or More-6  -TT Walk 10 Steps or More-6  -SW      Row Name 06/26/25 1200 06/26/25 1154       Functional  Assessment    Outcome Measure Options AM-PAC 6 Clicks Basic Mobility (PT)  -TT AM-PAC 6 Clicks Daily Activity (OT)  -              User Key  (r) = Recorded By, (t) = Taken By, (c) = Cosigned By      Initials Name Provider Type    Radha Baer, RN Registered Nurse    Sofía Barbosa OT Occupational Therapist    TT Gabriela Mendoza, PT Physical Therapist                                 Physical Therapy Education       Title: PT OT SLP Therapies (In Progress)       Topic: Physical Therapy (In Progress)       Point: Mobility training (Done)       Learning Progress Summary            Patient Acceptance, E, VU by TT at 6/26/2025 1200                      Point: Home exercise program (Not Started)       Learner Progress:  Not documented in this visit.              Point: Body mechanics (Done)       Learning Progress Summary            Patient Acceptance, E, VU by TT at 6/26/2025 1200                      Point: Precautions (Done)       Learning Progress Summary            Patient Acceptance, E, VU by TT at 6/26/2025 1200                                      User Key       Initials Effective Dates Name Provider Type Discipline    TT 05/30/25 -  Gabriela Mendoza, PT Physical Therapist PT                  PT Recommendation and Plan  Planned Therapy Interventions (PT): balance training, bed mobility training, home exercise program, gait training, patient/family education, postural re-education, ROM (range of motion), stair training, strengthening, stretching, transfer training  Outcome Evaluation: PT initial evaluation completed. Pt demonstrating mild decrease in functional endurance, balance deficits, generalized weakness and increased falls risk compared to reported baseline warranting continued skilled IP PT interventions to promote maximal IND w/ functional mobility. SBA to EOB and stand, CGA to ambulate in hallway. PT recommending home w/ assist upon d/c based on current presentation.     Time  Calculation:   PT Evaluation Complexity  History, PT Evaluation Complexity: 1-2 personal factors and/or comorbidities  Examination of Body Systems (PT Eval Complexity): total of 3 or more elements  Clinical Presentation (PT Evaluation Complexity): evolving  Clinical Decision Making (PT Evaluation Complexity): moderate complexity  Overall Complexity (PT Evaluation Complexity): moderate complexity     PT Charges       Row Name 06/26/25 1200             Time Calculation    Start Time 1124  -TT      PT Received On 06/26/25  -TT      PT Goal Re-Cert Due Date 07/06/25  -TT         Untimed Charges    PT Eval/Re-eval Minutes 49  -TT         Total Minutes    Untimed Charges Total Minutes 49  -TT       Total Minutes 49  -TT                User Key  (r) = Recorded By, (t) = Taken By, (c) = Cosigned By      Initials Name Provider Type    TT Gabriela Mendoza, PT Physical Therapist                  Therapy Charges for Today       Code Description Service Date Service Provider Modifiers Qty    [id number] HC PT EVAL MOD COMPLEXITY 4 6/26/2025 Gabriela Mendoza, PT GP 1            PT G-Codes  Outcome Measure Options: AM-PAC 6 Clicks Basic Mobility (PT)  AM-PAC 6 Clicks Score (PT): 20  AM-PAC 6 Clicks Score (OT): 21  PT Discharge Summary  Anticipated Discharge Disposition (PT): home with assist    Gabriela Mendoza PT  6/26/2025

## 2025-06-26 NOTE — PLAN OF CARE
Goal Outcome Evaluation:  Plan of Care Reviewed With: patient           Outcome Evaluation: Pt's ADL independence limited d/t increased pain, decreased functional endurance, & mild balance deficits. Pt would benefit from continued skilled IPOT services to address current functional deficits. Rec home w/ A at d/c.    Anticipated Discharge Disposition (OT): home with assist

## 2025-06-26 NOTE — PLAN OF CARE
Goal Outcome Evaluation:    ART Line removed.     Espinal removed. Has great UOP.     PT/OT ambulated in room today. Tolerated well.     Ibuprofen added to regimen due to meeting Acetaminophen level. Provider aware.     Per BATOOL Cooper may use a one time dose of Dilaudid 0.25 is absolutely necessary.

## 2025-06-26 NOTE — PLAN OF CARE
VSS. MRI @ 0300. Pain controlled with tylenol/ norco.   Problem: Adult Inpatient Plan of Care  Goal: Plan of Care Review  Outcome: Progressing  Goal: Patient-Specific Goal (Individualized)  Outcome: Progressing  Goal: Absence of Hospital-Acquired Illness or Injury  Outcome: Progressing  Intervention: Identify and Manage Fall Risk  Recent Flowsheet Documentation  Taken 6/26/2025 0600 by Chloe Colorado RN  Safety Promotion/Fall Prevention: safety round/check completed  Taken 6/26/2025 0400 by Chloe Colorado RN  Safety Promotion/Fall Prevention: safety round/check completed  Taken 6/26/2025 0200 by Chloe Colorado RN  Safety Promotion/Fall Prevention: safety round/check completed  Taken 6/26/2025 0000 by Chloe Colorado RN  Safety Promotion/Fall Prevention: safety round/check completed  Taken 6/25/2025 2200 by Chloe Colorado RN  Safety Promotion/Fall Prevention: safety round/check completed  Taken 6/25/2025 2000 by Chloe Colorado RN  Safety Promotion/Fall Prevention: safety round/check completed  Intervention: Prevent Skin Injury  Recent Flowsheet Documentation  Taken 6/26/2025 0600 by Chloe Colorado RN  Body Position: position changed independently  Taken 6/26/2025 0400 by Chloe Colorado RN  Body Position: position changed independently  Taken 6/26/2025 0200 by Chloe Colorado RN  Body Position: position changed independently  Taken 6/26/2025 0000 by Chloe Colorado RN  Body Position: position changed independently  Taken 6/25/2025 2200 by Chloe Colorado RN  Body Position: position changed independently  Taken 6/25/2025 2000 by Chloe Colorado RN  Body Position: position changed independently  Intervention: Prevent and Manage VTE (Venous Thromboembolism) Risk  Recent Flowsheet Documentation  Taken 6/26/2025 0600 by Chloe Colorado RN  VTE Prevention/Management:   bilateral   SCDs (sequential compression devices) on  Taken 6/26/2025 0400 by Chloe Colorado  RN  VTE Prevention/Management:   bilateral   SCDs (sequential compression devices) on  Taken 6/26/2025 0200 by Chloe Colorado RN  VTE Prevention/Management:   bilateral   SCDs (sequential compression devices) on  Taken 6/26/2025 0000 by Chloe Colorado RN  VTE Prevention/Management:   bilateral   SCDs (sequential compression devices) on  Taken 6/25/2025 2200 by Chloe Colorado RN  VTE Prevention/Management:   bilateral   SCDs (sequential compression devices) on  Taken 6/25/2025 2000 by Chloe Colorado RN  VTE Prevention/Management:   bilateral   SCDs (sequential compression devices) on  Intervention: Prevent Infection  Recent Flowsheet Documentation  Taken 6/26/2025 0600 by Chloe Colorado RN  Infection Prevention: environmental surveillance performed  Taken 6/26/2025 0400 by Chloe Colorado RN  Infection Prevention: environmental surveillance performed  Taken 6/26/2025 0200 by Chloe Colorado RN  Infection Prevention: environmental surveillance performed  Taken 6/26/2025 0000 by Chloe Colorado RN  Infection Prevention: environmental surveillance performed  Taken 6/25/2025 2200 by Chloe Colorado RN  Infection Prevention: environmental surveillance performed  Taken 6/25/2025 2000 by Chloe Colorado RN  Infection Prevention: environmental surveillance performed  Goal: Optimal Comfort and Wellbeing  Outcome: Progressing  Intervention: Monitor Pain and Promote Comfort  Recent Flowsheet Documentation  Taken 6/26/2025 0400 by Chloe Colorado RN  Pain Management Interventions: pain medication given  Taken 6/26/2025 0000 by Chloe Colorado RN  Pain Management Interventions:   pillow support provided   position adjusted  Taken 6/25/2025 2200 by Chloe Colorado RN  Pain Management Interventions: pain medication given  Taken 6/25/2025 2000 by Chloe Colorado RN  Pain Management Interventions: pain medication given  Intervention: Provide Person-Centered Care  Recent  Flowsheet Documentation  Taken 6/26/2025 0600 by Chloe Colorado RN  Trust Relationship/Rapport:   care explained   reassurance provided  Taken 6/26/2025 0400 by Chloe Colorado RN  Trust Relationship/Rapport:   care explained   reassurance provided  Taken 6/26/2025 0200 by Chloe Colorado RN  Trust Relationship/Rapport:   care explained   reassurance provided  Taken 6/26/2025 0000 by Chloe Colorado RN  Trust Relationship/Rapport:   care explained   reassurance provided  Taken 6/25/2025 2200 by Chloe Colorado RN  Trust Relationship/Rapport:   care explained   reassurance provided  Taken 6/25/2025 2000 by Chloe Colorado RN  Trust Relationship/Rapport:   care explained   reassurance provided  Goal: Readiness for Transition of Care  Outcome: Progressing     Problem: Pain Acute  Goal: Optimal Pain Control and Function  Outcome: Progressing  Intervention: Optimize Psychosocial Wellbeing  Recent Flowsheet Documentation  Taken 6/26/2025 0600 by Chloe Colorado RN  Diversional Activities: television  Taken 6/26/2025 0400 by Chloe Colorado RN  Diversional Activities: television  Taken 6/26/2025 0200 by Chloe Colorado RN  Diversional Activities: television  Taken 6/26/2025 0000 by Chloe Colorado RN  Diversional Activities: television  Taken 6/25/2025 2200 by Chloe Colorado RN  Diversional Activities: television  Taken 6/25/2025 2000 by Chloe Colorado RN  Diversional Activities: television  Intervention: Develop Pain Management Plan  Recent Flowsheet Documentation  Taken 6/26/2025 0400 by Chloe Colorado RN  Pain Management Interventions: pain medication given  Taken 6/26/2025 0000 by Chloe Colorado RN  Pain Management Interventions:   pillow support provided   position adjusted  Taken 6/25/2025 2200 by Chloe Colorado RN  Pain Management Interventions: pain medication given  Taken 6/25/2025 2000 by Chloe Colorado RN  Pain Management Interventions: pain  medication given  Intervention: Prevent or Manage Pain  Recent Flowsheet Documentation  Taken 6/26/2025 0600 by Chloe Colorado RN  Medication Review/Management: medications reviewed  Taken 6/26/2025 0400 by Chloe Colorado RN  Medication Review/Management: medications reviewed  Taken 6/26/2025 0200 by Chloe Colorado RN  Medication Review/Management: medications reviewed  Taken 6/26/2025 0000 by Chloe Colorado RN  Medication Review/Management: medications reviewed  Taken 6/25/2025 2200 by Chloe Colorado RN  Medication Review/Management: medications reviewed  Taken 6/25/2025 2000 by Chloe Colorado RN  Medication Review/Management: medications reviewed     Problem: Fall Injury Risk  Goal: Absence of Fall and Fall-Related Injury  Outcome: Progressing  Intervention: Identify and Manage Contributors  Recent Flowsheet Documentation  Taken 6/26/2025 0600 by Chloe Colorado RN  Medication Review/Management: medications reviewed  Taken 6/26/2025 0400 by Chloe Colorado RN  Medication Review/Management: medications reviewed  Taken 6/26/2025 0200 by Chloe Colorado RN  Medication Review/Management: medications reviewed  Taken 6/26/2025 0000 by Chloe Colorado RN  Medication Review/Management: medications reviewed  Taken 6/25/2025 2200 by Chloe Colorado RN  Medication Review/Management: medications reviewed  Taken 6/25/2025 2000 by Chloe Colorado RN  Medication Review/Management: medications reviewed  Intervention: Promote Injury-Free Environment  Recent Flowsheet Documentation  Taken 6/26/2025 0600 by Chloe Colorado RN  Safety Promotion/Fall Prevention: safety round/check completed  Taken 6/26/2025 0400 by Chloe Colorado RN  Safety Promotion/Fall Prevention: safety round/check completed  Taken 6/26/2025 0200 by Chloe Colorado RN  Safety Promotion/Fall Prevention: safety round/check completed  Taken 6/26/2025 0000 by Chloe Colorado RN  Safety  Promotion/Fall Prevention: safety round/check completed  Taken 6/25/2025 2200 by Chloe Colorado, RN  Safety Promotion/Fall Prevention: safety round/check completed  Taken 6/25/2025 2000 by Chloe Colorado, RN  Safety Promotion/Fall Prevention: safety round/check completed   Goal Outcome Evaluation:

## 2025-06-26 NOTE — THERAPY EVALUATION
Patient Name: Evelia Ryan  : 1975    MRN: [id number]                              Today's Date: 2025       Admit Date: 2025    Visit Dx:     ICD-10-CM ICD-9-CM   1. Brain mass  G93.89 348.89     Patient Active Problem List   Diagnosis    Anxiety and depression    Brain mass     Past Medical History:   Diagnosis Date    Anxiety and depression 2014     Past Surgical History:   Procedure Laterality Date    APPENDECTOMY      CEREBRAL ANGIOGRAM N/A 2025    Procedure: Cerebral angiogram with possible embolization - Right radial access;  Surgeon: Roney Gu MD;  Location:  [facility] CATH INVASIVE LOCATION;  Service: Interventional Radiology;  Laterality: N/A;    CHOLECYSTECTOMY      CRANIOTOMY FOR TUMOR Left 2025    Procedure: CRANIOTOMY FOR TUMOR STEREOTACTIC WITH STEALTH, LEFT OCCIPITAL;  Surgeon: Benito Mitchell MD;  Location: [facility];  Service: Neurosurgery;  Laterality: Left;    GASTRIC SLEEVE LAPAROSCOPIC      HYSTERECTOMY        General Information       Row Name 25 1120          OT Time and Intention    Document Type evaluation  -     Mode of Treatment occupational therapy  -       Row Name 25 1120          General Information    Patient Profile Reviewed yes  -     Prior Level of Function independent:;bed mobility;transfer;all household mobility;community mobility;ADL's  No DME use at baseline  -     Existing Precautions/Restrictions fall;other (see comments)  RONI drain  -     Barriers to Rehab medically complex  -       Row Name 25 1120          Living Environment    Current Living Arrangements home  -     People in Home spouse;child(cici), adult  -       Row Name 25 1120          Home Main Entrance    Number of Stairs, Main Entrance two  -       Row Name 25 1120          Stairs Within Home, Primary    Stairs, Within Home, Primary All needs met on main floor  -     Number of Stairs, Within Home, Primary other (see  comments)  -[facility] Name 06/26/25 1120          Cognition    Orientation Status (Cognition) oriented x 3  -[facility] Name 06/26/25 1120          Safety Issues/Impairments Affecting Functional Mobility    Impairments Affecting Function (Mobility) endurance/activity tolerance;pain;strength;visual/perceptual  -               User Key  (r) = Recorded By, (t) = Taken By, (c) = Cosigned By      Initials Name Provider Type     Sofía Rollins OT Occupational Therapist                     Mobility/ADL's       Row Name 06/26/25 1149          Bed Mobility    Bed Mobility supine-sit;sit-supine  Simultaneous filing. User may be unaware of other data.  -     Supine-Sit [place] (Bed Mobility) standby assist  -     Sit-Supine [place] (Bed Mobility) standby assist  -[facility] Name 06/26/25 1149          Transfers    Transfers sit-stand transfer;stand-sit transfer;toilet transfer  -[facility] 06/26/25 1149          Sit-Stand Transfer    Sit-Stand [place] (Transfers) standby assist  Simultaneous filing. User may be unaware of other data.  -[facility] Name 06/26/25 1149          Stand-Sit Transfer    Stand-Sit [place] (Transfers) standby assist  -[facility] 06/26/25 1149          Toilet Transfer    Type (Toilet Transfer) sit-stand;stand-sit  -     [place] Level (Toilet Transfer) standby assist  -     Assistive Device (Toilet Transfer) commode;grab bars/safety frame  -[facility] Name 06/26/25 1149          Functional Mobility    Functional Mobility- Ind. Level contact guard assist  -     Functional Mobility-Distance (Feet) --  > HH distance in hallway  -     Functional Mobility- Comment Mild unsteadiness noted but no overt LOB  -[facility] Name 06/26/25 1149          Activities of Daily Living    BADL Assessment/Intervention lower body dressing;upper body dressing;toileting;grooming  -[facility] Name 06/26/25 1149          Lower Body Dressing  Assessment/Training    [place] Level (Lower Body Dressing) don;socks;dependent (less than 25% patient effort);verbal cues  -     Position (Lower Body Dressing) supine  -     Comment, (Lower Body Dressing) Pt educated on performing cross-leg technique to reduce pain with bending forward for LBD.  -       Row Name 06/26/25 1149          Upper Body Dressing Assessment/Training    [place] Level (Upper Body Dressing) don;doff;pajama/robe;minimum assist (75% patient effort)  -     Position (Upper Body Dressing) edge of bed sitting  -[facility] Name 06/26/25 1149          Toileting Assessment/Training    [place] Level (Toileting) adjust/manage clothing;perform perineal hygiene;independent  -     Assistive Devices (Toileting) commode;grab bar/safety frame  -     Position (Toileting) unsupported sitting  -[facility] Name 06/26/25 1149          Grooming Assessment/Training    [place] Level (Grooming) wash face, hands;standby assist  hands only  -     Position (Grooming) sink side  -               User Key  (r) = Recorded By, (t) = Taken By, (c) = Cosigned By      Initials Name Provider Type     Sofía Rollins OT Occupational Therapist                   Obj/Interventions       [facility] Name 06/26/25 1151          Sensory Assessment (Somatosensory)    Sensory Assessment (Somatosensory) right UE  -     Right UE Sensory Assessment general sensation;impaired  -     Sensory Subjective Reports numbness;tingling  -[facility] Name 06/26/25 1151          Vision Assessment/Intervention    Visual Impairment/Limitations WFL  Pt reports blurry vision  -[facility] Name 06/26/25 1151          Range of Motion Comprehensive    General Range of Motion bilateral upper extremity ROM WFL  -[facility] Name 06/26/25 1151          Strength Comprehensive (MMT)    General Manual Muscle Testing (MMT) Assessment upper extremity strength deficits identified  -     Comment, General Manual Muscle Testing  (MMT) Assessment BUE grossly 4/5  -       Row Name 06/26/25 1151          Balance    Balance Assessment sitting static balance;sitting dynamic balance;sit to stand dynamic balance;standing static balance;standing dynamic balance  -     Static Sitting Balance standby assist  -     Dynamic Sitting Balance standby assist  -     Position, Sitting Balance unsupported;sitting edge of bed  -     Sit to Stand Dynamic Balance standby assist  -     Static Standing Balance standby assist  -     Dynamic Standing Balance contact guard  -     Position/Device Used, Standing Balance unsupported  -     Balance Interventions sitting;sit to stand;occupation based/functional task  -               User Key  (r) = Recorded By, (t) = Taken By, (c) = Cosigned By      Initials Name Provider Type     Sofía Rollins OT Occupational Therapist                   Goals/Plan       Row Name 06/26/25 1153          Transfer Goal 1 (OT)    Activity/Assistive Device (Transfer Goal 1, OT) bed-to-chair/chair-to-bed;toilet  -     [place] Level/Cues Needed (Transfer Goal 1, OT) supervision required  -     Time Frame (Transfer Goal 1, OT) long term goal (LTG);10 days  -     Progress/Outcome (Transfer Goal 1, OT) goal ongoing  -       Row Name 06/26/25 1153          Dressing Goal 1 (OT)    Activity/Device (Dressing Goal 1, OT) lower body dressing  don/doff socks, pants w/ AAD  -     [place]/Cues Needed (Dressing Goal 1, OT) minimum assist (75% or more patient effort)  -     Time Frame (Dressing Goal 1, OT) long term goal (LTG);10 days  -     Progress/Outcome (Dressing Goal 1, OT) goal ongoing  -       Row Name 06/26/25 1153          Grooming Goal 1 (OT)    Activity/Device (Grooming Goal 1, OT) oral care;wash face, hands;hair care  standing sinkside  -     [place] (Grooming Goal 1, OT) supervision required  -     Time Frame (Grooming Goal 1, OT) short term goal (STG);5 days  -      Progress/Outcome (Grooming Goal 1, OT) goal ongoing  -[facility] 06/26/25 1153          Therapy Assessment/Plan (OT)    Planned Therapy Interventions (OT) activity tolerance training;adaptive equipment training;BADL retraining;functional balance retraining;IADL retraining;occupation/activity based interventions;patient/caregiver education/training;ROM/therapeutic exercise;strengthening exercise;transfer/mobility retraining  -               User Key  (r) = Recorded By, (t) = Taken By, (c) = Cosigned By      Initials Name Provider Type     Sofía Rollins, CAROLINE Occupational Therapist                   Clinical Impression       Row Name 06/26/25 1152          Pain Assessment    [place]  [facility]treatment Pain Rating 9/10  -     Pain Location head  -     Pain Side/Orientation generalized  -     Pain Management Interventions exercise or physical activity utilized;positioning techniques utilized;nursing notified  -     Response to Pain Interventions activity participation with tolerable pain  -MC       Row Name 06/26/25 1152          Plan of Care Review    Plan of Care Reviewed With patient  -     Outcome Evaluation Pt's ADL independence limited d/t increased pain, decreased functional endurance, & mild balance deficits. Pt would benefit from continued skilled IPOT services to address current functional deficits. Rec home w/ A at d/c.  -[facility] 06/26/25 1152          Therapy Assessment/Plan (OT)    Patient/Family Therapy Goal Statement (OT) Return to PLOF  -     Rehab Potential (OT) good  -     Criteria for Skilled Therapeutic Interventions Met (OT) yes;skilled treatment is necessary  -     Therapy Frequency (OT) daily  -     Predicted Duration of Therapy Intervention (OT) 10 days  -[facility] 06/26/25 1153          Therapy Plan Review/Discharge Plan (OT)    Anticipated Discharge Disposition (OT) home with assist  -[facility] 06/26/25 1152           Vital Signs    Pre Systolic BP Rehab 123  -MC     Pre Treatment Diastolic BP 63  -MC     Post Systolic BP Rehab 120  -MC     Post Treatment Diastolic BP 85  -MC     Pretreatment Heart Rate (beats/min) 54  -MC     Posttreatment Heart Rate (beats/min) 54  -MC     Pre SpO2 (%) 95  -MC     O2 Delivery Pre Treatment room air  -MC     O2 Delivery Intra Treatment room air  -MC     Post SpO2 (%) 95  -MC     O2 Delivery Post Treatment room air  -MC     Pre Patient Position Supine  -MC     Intra Patient Position Standing  -MC     Post Patient Position Supine  -MC       Row Name 06/26/25 1152          Positioning and Restraints    Pre-Treatment Position in bed  -MC     Post Treatment Position bed  -MC     In Bed supine;call light within reach;encouraged to call for assist;side rails up x3;with ns  -               User Key  (r) = Recorded By, (t) = Taken By, (c) = Cosigned By      Initials Name Provider Type    Sofía Barbosa, CAROLINE Occupational Therapist                   Outcome Measures       Row Name 06/26/25 1154          How much help from another is currently needed...    Putting on and taking off regular lower body clothing? 2  -MC     Bathing (including washing, rinsing, and drying) 3  -MC     Toileting (which includes using toilet bed pan or urinal) 4  -MC     Putting on and taking off regular upper body clothing 4  -MC     Taking care of personal grooming (such as brushing teeth) 4  -MC     Eating meals 4  -MC     AM-PAC 6 Clicks Score (OT) 21  -MC       Row Name 06/26/25 0800          How much help from another person do you currently need...    Turning from your back to your side while in flat bed without using bedrails? 4  -SW     Moving from lying on back to sitting on the side of a flat bed without bedrails? 4  -SW     Moving to and from a bed to a chair (including a wheelchair)? 3  -SW     Standing up from a chair using your arms (e.g., wheelchair, bedside chair)? 3  -SW     Climbing 3-5 steps with  a railing? 3  -SW     To walk in hospital room? 3  -SW     AM-PAC 6 Clicks Score (PT) 20  -SW       Row Name 06/26/25 1154          Functional Assessment    Outcome Measure Options AM-PAC 6 Clicks Daily Activity (OT)  -               User Key  (r) = Recorded By, (t) = Taken By, (c) = Cosigned By      Initials Name Provider Type     Radha Soliman RN Registered Nurse     Sofía Rollins, CAROLINE Occupational Therapist                    Occupational Therapy Education       Title: PT OT SLP Therapies (In Progress)       Topic: Occupational Therapy (In Progress)       Point: ADL training (Done)       Learning Progress Summary            Patient Acceptance, E, VU by  at 6/26/2025 1155                      Point: Precautions (Done)       Learning Progress Summary            Patient Acceptance, E, VU by  at 6/26/2025 1155                      Point: Body mechanics (Done)       Learning Progress Summary            Patient Acceptance, E, VU by  at 6/26/2025 1155                                      User Key       Initials Effective Dates Name Provider Type Discipline     10/14/22 -  Sofía Rollins OT Occupational Therapist OT                  OT Recommendation and Plan  Planned Therapy Interventions (OT): activity tolerance training, adaptive equipment training, BADL retraining, functional balance retraining, IADL retraining, occupation/activity based interventions, patient/caregiver education/training, ROM/therapeutic exercise, strengthening exercise, transfer/mobility retraining  Therapy Frequency (OT): daily  Plan of Care Review  Plan of Care Reviewed With: patient  Outcome Evaluation: Pt's ADL independence limited d/t increased pain, decreased functional endurance, & mild balance deficits. Pt would benefit from continued skilled IPOT services to address current functional deficits. Rec home w/ A at d/c.     Time Calculation:   Evaluation Complexity (OT)  Review Occupational Profile/Medical/Therapy  History Complexity: expanded/moderate complexity  Assessment, Occupational Performance/Identification of Deficit Complexity: 3-5 performance deficits  Clinical Decision Making Complexity (OT): detailed assessment/moderate complexity  Overall Complexity of Evaluation (OT): moderate complexity     Time Calculation- OT       Row Name 06/26/25 1155             Time Calculation- OT    OT Start Time 1120  -MC      OT Received On 06/26/25  -      OT Goal Re-Cert Due Date 07/06/25  -         Untimed Charges    OT Eval/Re-eval Minutes 46  -MC         Total Minutes    Untimed Charges Total Minutes 46  -MC       Total Minutes 46  -MC                User Key  (r) = Recorded By, (t) = Taken By, (c) = Cosigned By      Initials Name Provider Type     Sofía Rollins OT Occupational Therapist                  Therapy Charges for Today       Code Description Service Date Service Provider Modifiers Qty    [id number] HC OT EVAL MOD COMPLEXITY 4 6/26/2025 Sofía Rollins OT GO 1                 Sofía Rollins OT  6/26/2025

## 2025-06-26 NOTE — PROGRESS NOTES
"INTENSIVIST NOTE     SUBJECTIVE     No acute issues overnight. Patient has some blurred vision which is the same as her baseline. No new neurologic changes.     The patient's relevant past medical, surgical and social history were reviewed and updated in Epic as appropriate.       OBJECTIVE     Vital Sign Min/Max for last 24 hours  Temp  Min: 96.8 °F (36 °C)  Max: 98.9 °F (37.2 °C)   BP  Min: 84/60  Max: 129/77   Pulse  Min: 45  Max: 84   Resp  Min: 14  Max: 18   SpO2  Min: 93 %  Max: 100 %   No data recorded   Weight  Min: 92.1 kg (203 lb 0.7 oz)  Max: 92.1 kg (203 lb 0.7 oz)      Intake/Output Summary (Last 24 hours) at 6/26/2025 1149  Last data filed at 6/26/2025 0900  Gross per 24 hour   Intake 2309 ml   Output 5870 ml   Net -3561 ml      Flowsheet Rows      Flowsheet Row First Filed Value   Admission Height 177.8 cm (70\") Documented at 06/18/2025 1926   Admission Weight 95.3 kg (210 lb) Documented at 06/18/2025 1926               06/18/25  1926 06/26/25  0600   Weight: 95.3 kg (210 lb) 92.1 kg (203 lb 0.7 oz)            Objective:  General Appearance:  Comfortable, well-appearing, in pain, not in pain and in no acute distress.    Vital signs: (most recent): Blood pressure 120/64, pulse 61, temperature 98.9 °F (37.2 °C), temperature source Axillary, resp. rate 16, height 177.8 cm (70\"), weight 92.1 kg (203 lb 0.7 oz), SpO2 95%.  Vital signs are normal.    Lungs:  Normal effort and normal respiratory rate.  Breath sounds clear to auscultation.    Heart: Normal rate.  Regular rhythm.  S1 normal and S2 normal.    Abdomen: Abdomen is soft.  Bowel sounds are normal.     Pulses: Distal pulses are intact.    Neurological: Patient is alert.  Normal strength.    Skin:  Warm and dry.  (Crani site- dressing c/d/I. RONI drain with bloody output. )              I reviewed the patient's new clinical results.  I reviewed the patient's new imaging results/reports including actual images and agree with " reports.    INFUSIONS  lactated ringers, 9 mL/hr  niCARdipine, 5-15 mg/hr, Last Rate: Stopped (06/26/25 0120)        Results from last 7 days   Lab Units 06/26/25 0415 06/25/25  0446 06/24/25  0611   WBC 10*3/mm3 15.69* 9.61 10.11   HEMOGLOBIN g/dL 13.3 15.2 15.4   HEMATOCRIT % 38.6 43.4 43.9   PLATELETS 10*3/mm3 296 316 338     Results from last 7 days   Lab Units 06/26/25  0415 06/25/25  0446 06/24/25  0611   SODIUM mmol/L 137 138 136   POTASSIUM mmol/L 3.8 3.9 3.9   CHLORIDE mmol/L 97* 99 99   CO2 mmol/L 27.9 29.0 22.9   BUN mg/dL 13.7 17.0 13.7   GLUCOSE mg/dL 151* 126* 110*   CREATININE mg/dL 0.59 0.58 0.63   MAGNESIUM mg/dL 2.1  --   --    CALCIUM mg/dL 8.8 9.2 9.6       I reviewed the patient's medications.    Assessment & Plan   ASSESSMENT      Active Hospital Problems    Diagnosis     **Brain mass     Anxiety and depression         Ms. Ryan is a 50 year old female with pmhx of insomnia and anxiety who presented to [facility] on 6/18 after being found to have an occipital brain mass with mild mass effect on the posterior falx and posterior horn left lateral ventricle and mild edema. Patient was admitted to medicine team and started on steroids. Neurosurgery was consulted and followed throughout admission. On 6/24, patient underwent angiogram with emobolization with PVA particles and microcoils of the large left meningioma.   On 6/25, patient was taken to the OR where she underwent left occipital craniotomy for resection of brain tumor.     Post-operatively, patient was transferred to the ICU for higher level of care.     Postop CT H is stable. MRI brain- small amount of residual tumor situated around the sinus.     Neurosurgery plans for SRS in 3 months.       PLAN     Brain mass, s/p embolization of large left occipital meningioma 6/24 and left occipital crani/resection of brain tumor 6/25: post-op care per neurosurgery. Await post-op imaging ordered per neurosurgery. Continue Decadron. PRN Cardene to maintain  SBP < 140 mmHg. Keppra added today as prophylaxis.   Anxiety  Insomnia: Valium TID.  DVT prophy: SCDs  Dispo: transfer to tele          I discussed the patient's findings and my recommendations with patient     Plan of care and goals reviewed with multidisciplinary team at daily rounds.    DAVID Greer  Pulmonary and Critical Care Medicine  06/26/25 11:49 EDT

## 2025-06-26 NOTE — PROGRESS NOTES
Chief complaint: s/p left occipital craniotomy     Admit Diagnosis:   Brain mass [G93.89]     Subjective: Patient reports increasing incisional/head/neck pain overnight but otherwise she is doing well. She denies any new symptoms since surgery.     Objective:    Vitals:    25 0700   BP: 101/56   Pulse: (!) 46   Resp:    Temp:    SpO2: 93%     Pulse  Av.2  Min: 46  Max: 84  Systolic (24hrs), Av , Min:84 , Max:165     Diastolic (24hrs), Av, Min:54, Max:95    Temp (24hrs), Av.9 °F (36.6 °C), Min:96.8 °F (36 °C), Max:98.3 °F (36.8 °C)      Patient appears comfortable, resting, lying in bed. No acute distress.   Awake, alert and oriented x 3  Speech f/c  Opens eyes spontaneously  EOM intact bilaterally. Visual field testing without deficit.   Pupils 3mm reactive bilaterally  Face symmetric  Tongue midline  Motor exam shows 5/5 strength in all 4 extremities. Moves all 4 to command.   Normal sensation to light touch in all 4 extremities  No Pitts's or clonus bilaterally  No pronator drift or dysmetria  Gait not assessed  Incision dressing is dry and intact. RONI drain with about 20 of output this morning.     Lab Results   Component Value Date     2025       A/P:   Admit Diagnosis:   Brain mass [G93.89]     This is a 50 year old female who is s/p embolization/angiogram with Dr. Gu on 25. She underwent left occipital craniotomy for resection of meningioma yesterday afternoon with Dr. Mitchell. Intraoperative pathology consistent with meningioma. Postoperative head CT reviewed and is stable. MRI of the brain postoperatively is also stable. There is a small amount of residual tumor situated around the sinus. We will plan for SRS in about 3 months. I would like to ambulate her today and have her work with PT/OT. We can DC hobbs and art line. We should be able to transfer out of the unit today. I will check on her drain output later today and we will most likely pull it as long as she  is not putting out a lot. I am also going to add on a scheduled muscle relaxant to help with her postoperative pain before I increase her narcotic as she is having some constipation. I am also going to start her on some Keppra for seizure prophylaxis. Please reach out with any further questions or concerns. I anticipate discharge in the next day or two.     Jaleesa Carlson PA-C

## 2025-06-26 NOTE — PLAN OF CARE
Goal Outcome Evaluation:  Plan of Care Reviewed With: patient           Outcome Evaluation: PT initial evaluation completed. Pt demonstrating mild decrease in functional endurance, balance deficits, generalized weakness and increased falls risk compared to reported baseline warranting continued skilled IP PT interventions to promote maximal IND w/ functional mobility. SBA to EOB and stand, CGA to ambulate in hallway. PT recommending home w/ assist upon d/c based on current presentation.    Anticipated Discharge Disposition (PT): home with assist

## 2025-06-27 PROCEDURE — 25010000002 DEXAMETHASONE PER 1 MG: Performed by: NEUROLOGICAL SURGERY

## 2025-06-27 PROCEDURE — 99232 SBSQ HOSP IP/OBS MODERATE 35: CPT | Performed by: PHYSICIAN ASSISTANT

## 2025-06-27 PROCEDURE — 97530 THERAPEUTIC ACTIVITIES: CPT

## 2025-06-27 RX ORDER — DIAZEPAM 5 MG/1
5 TABLET ORAL 3 TIMES DAILY
Qty: 45 TABLET | Refills: 0 | Status: SHIPPED | OUTPATIENT
Start: 2025-06-27 | End: 2025-06-28

## 2025-06-27 RX ORDER — HYDROCODONE BITARTRATE AND ACETAMINOPHEN 7.5; 325 MG/1; MG/1
1 TABLET ORAL EVERY 6 HOURS PRN
Qty: 25 TABLET | Refills: 0 | Status: SHIPPED | OUTPATIENT
Start: 2025-06-27 | End: 2025-06-28

## 2025-06-27 RX ADMIN — DEXAMETHASONE SODIUM PHOSPHATE 4 MG: 4 INJECTION, SOLUTION INTRA-ARTICULAR; INTRALESIONAL; INTRAMUSCULAR; INTRAVENOUS; SOFT TISSUE at 00:31

## 2025-06-27 RX ADMIN — HYDROCODONE BITARTRATE AND ACETAMINOPHEN 1 TABLET: 7.5; 325 TABLET ORAL at 06:55

## 2025-06-27 RX ADMIN — LEVETIRACETAM 500 MG: 500 TABLET, FILM COATED ORAL at 08:12

## 2025-06-27 RX ADMIN — HYDROCODONE BITARTRATE AND ACETAMINOPHEN 1 TABLET: 7.5; 325 TABLET ORAL at 21:32

## 2025-06-27 RX ADMIN — HYDROCODONE BITARTRATE AND ACETAMINOPHEN 1 TABLET: 7.5; 325 TABLET ORAL at 17:45

## 2025-06-27 RX ADMIN — DIAZEPAM 5 MG: 5 TABLET ORAL at 08:12

## 2025-06-27 RX ADMIN — IBUPROFEN 600 MG: 600 TABLET, FILM COATED ORAL at 04:45

## 2025-06-27 RX ADMIN — Medication 10 ML: at 08:12

## 2025-06-27 RX ADMIN — IBUPROFEN 600 MG: 600 TABLET, FILM COATED ORAL at 14:07

## 2025-06-27 RX ADMIN — HYDROCODONE BITARTRATE AND ACETAMINOPHEN 1 TABLET: 7.5; 325 TABLET ORAL at 02:55

## 2025-06-27 RX ADMIN — Medication 10 ML: at 20:22

## 2025-06-27 RX ADMIN — DEXAMETHASONE SODIUM PHOSPHATE 4 MG: 4 INJECTION, SOLUTION INTRA-ARTICULAR; INTRALESIONAL; INTRAMUSCULAR; INTRAVENOUS; SOFT TISSUE at 06:00

## 2025-06-27 RX ADMIN — MUPIROCIN 1 APPLICATION: 20 OINTMENT TOPICAL at 20:22

## 2025-06-27 RX ADMIN — DIAZEPAM 5 MG: 5 TABLET ORAL at 20:21

## 2025-06-27 RX ADMIN — MUPIROCIN 1 APPLICATION: 20 OINTMENT TOPICAL at 08:12

## 2025-06-27 RX ADMIN — DEXAMETHASONE SODIUM PHOSPHATE 4 MG: 4 INJECTION, SOLUTION INTRA-ARTICULAR; INTRALESIONAL; INTRAMUSCULAR; INTRAVENOUS; SOFT TISSUE at 17:26

## 2025-06-27 RX ADMIN — DEXAMETHASONE SODIUM PHOSPHATE 4 MG: 4 INJECTION, SOLUTION INTRA-ARTICULAR; INTRALESIONAL; INTRAMUSCULAR; INTRAVENOUS; SOFT TISSUE at 23:01

## 2025-06-27 RX ADMIN — DEXAMETHASONE SODIUM PHOSPHATE 4 MG: 4 INJECTION, SOLUTION INTRA-ARTICULAR; INTRALESIONAL; INTRAMUSCULAR; INTRAVENOUS; SOFT TISSUE at 12:34

## 2025-06-27 RX ADMIN — DIAZEPAM 5 MG: 5 TABLET ORAL at 14:07

## 2025-06-27 RX ADMIN — HYDROCODONE BITARTRATE AND ACETAMINOPHEN 1 TABLET: 7.5; 325 TABLET ORAL at 12:34

## 2025-06-27 RX ADMIN — LEVETIRACETAM 500 MG: 500 TABLET, FILM COATED ORAL at 20:21

## 2025-06-27 RX ADMIN — ACETAMINOPHEN 650 MG: 325 TABLET ORAL at 20:21

## 2025-06-27 NOTE — PLAN OF CARE
VSS. Pain managed by norco/ibuprofen throughout shift. RONI drain output 50mL throughout shift.   Problem: Adult Inpatient Plan of Care  Goal: Plan of Care Review  Outcome: Progressing  Goal: Patient-Specific Goal (Individualized)  Outcome: Progressing  Goal: Absence of Hospital-Acquired Illness or Injury  Outcome: Progressing  Intervention: Identify and Manage Fall Risk  Recent Flowsheet Documentation  Taken 6/27/2025 0400 by Chloe Colorado RN  Safety Promotion/Fall Prevention: safety round/check completed  Taken 6/27/2025 0200 by Chloe Colorado RN  Safety Promotion/Fall Prevention: safety round/check completed  Taken 6/27/2025 0000 by Chloe Colorado RN  Safety Promotion/Fall Prevention: safety round/check completed  Taken 6/26/2025 2200 by Chloe Colorado RN  Safety Promotion/Fall Prevention: safety round/check completed  Taken 6/26/2025 2000 by Chloe Colorado RN  Safety Promotion/Fall Prevention: safety round/check completed  Intervention: Prevent Skin Injury  Recent Flowsheet Documentation  Taken 6/27/2025 0500 by Chloe Colorado RN  Body Position: position changed independently  Taken 6/27/2025 0200 by Chloe Colorado RN  Body Position: position changed independently  Taken 6/27/2025 0000 by Chloe Colorado RN  Body Position: position changed independently  Taken 6/26/2025 2200 by Chloe Colorado RN  Body Position: position changed independently  Taken 6/26/2025 2000 by Chloe Colorado RN  Body Position: position changed independently  Intervention: Prevent and Manage VTE (Venous Thromboembolism) Risk  Recent Flowsheet Documentation  Taken 6/26/2025 2200 by Chloe Colorado RN  VTE Prevention/Management:   bilateral   SCDs (sequential compression devices) on  Taken 6/26/2025 2000 by Chloe Colorado RN  VTE Prevention/Management:   bilateral   SCDs (sequential compression devices) on  Intervention: Prevent Infection  Recent Flowsheet Documentation  Taken 6/27/2025  0400 by Chloe Colorado RN  Infection Prevention: environmental surveillance performed  Taken 6/27/2025 0200 by Chloe Colorado RN  Infection Prevention: environmental surveillance performed  Taken 6/27/2025 0000 by Chloe Colorado RN  Infection Prevention: environmental surveillance performed  Taken 6/26/2025 2200 by Chloe Colorado RN  Infection Prevention: environmental surveillance performed  Taken 6/26/2025 2000 by Chloe Colorado RN  Infection Prevention: environmental surveillance performed  Goal: Optimal Comfort and Wellbeing  Outcome: Progressing  Intervention: Monitor Pain and Promote Comfort  Recent Flowsheet Documentation  Taken 6/27/2025 0200 by Chloe Colorado RN  Pain Management Interventions: pain medication given  Taken 6/27/2025 0000 by Chloe Colorado RN  Pain Management Interventions:   care clustered   pillow support provided  Taken 6/26/2025 2200 by Chloe Colorado RN  Pain Management Interventions: pain medication given  Taken 6/26/2025 2000 by Chloe Colorado RN  Pain Management Interventions: pain medication given  Intervention: Provide Person-Centered Care  Recent Flowsheet Documentation  Taken 6/27/2025 0400 by Chloe Colorado RN  Trust Relationship/Rapport:   care explained   reassurance provided  Taken 6/27/2025 0200 by Chloe Colorado RN  Trust Relationship/Rapport:   care explained   reassurance provided  Taken 6/27/2025 0000 by Chloe Colorado RN  Trust Relationship/Rapport:   care explained   reassurance provided  Taken 6/26/2025 2200 by Chloe Colorado RN  Trust Relationship/Rapport:   care explained   reassurance provided  Taken 6/26/2025 2000 by Chloe Colorado RN  Trust Relationship/Rapport:   care explained   reassurance provided  Goal: Readiness for Transition of Care  Outcome: Progressing     Problem: Pain Acute  Goal: Optimal Pain Control and Function  Outcome: Progressing  Intervention: Optimize Psychosocial  Wellbeing  Recent Flowsheet Documentation  Taken 6/27/2025 0400 by Chloe Colorado RN  Diversional Activities: television  Taken 6/27/2025 0200 by Chloe Colorado RN  Diversional Activities: television  Taken 6/27/2025 0000 by Chloe Colorado RN  Diversional Activities: television  Taken 6/26/2025 2200 by Chloe Colorado RN  Diversional Activities: television  Taken 6/26/2025 2000 by Chloe Colorado RN  Diversional Activities: television  Intervention: Develop Pain Management Plan  Recent Flowsheet Documentation  Taken 6/27/2025 0200 by Chloe Colorado RN  Pain Management Interventions: pain medication given  Taken 6/27/2025 0000 by Chloe Colorado RN  Pain Management Interventions:   care clustered   pillow support provided  Taken 6/26/2025 2200 by Chloe Colorado RN  Pain Management Interventions: pain medication given  Taken 6/26/2025 2000 by Chloe Colorado RN  Pain Management Interventions: pain medication given  Intervention: Prevent or Manage Pain  Recent Flowsheet Documentation  Taken 6/27/2025 0400 by Chloe Colorado RN  Medication Review/Management: medications reviewed  Taken 6/27/2025 0200 by Chloe Coloardo RN  Medication Review/Management: medications reviewed  Taken 6/27/2025 0000 by Chloe Colorado RN  Medication Review/Management: medications reviewed  Taken 6/26/2025 2200 by Chloe Colorado RN  Medication Review/Management: medications reviewed  Taken 6/26/2025 2000 by Chloe Colorado RN  Medication Review/Management: medications reviewed     Problem: Fall Injury Risk  Goal: Absence of Fall and Fall-Related Injury  Outcome: Progressing  Intervention: Identify and Manage Contributors  Recent Flowsheet Documentation  Taken 6/27/2025 0400 by Chloe Colorado RN  Medication Review/Management: medications reviewed  Taken 6/27/2025 0200 by Chloe Colorado RN  Medication Review/Management: medications reviewed  Taken 6/27/2025 0000 by  Chloe Colorado RN  Medication Review/Management: medications reviewed  Taken 6/26/2025 2200 by Chloe Colorado RN  Medication Review/Management: medications reviewed  Taken 6/26/2025 2000 by Chloe Colorado RN  Medication Review/Management: medications reviewed  Intervention: Promote Injury-Free Environment  Recent Flowsheet Documentation  Taken 6/27/2025 0400 by Chloe Colorado RN  Safety Promotion/Fall Prevention: safety round/check completed  Taken 6/27/2025 0200 by Chloe Colorado RN  Safety Promotion/Fall Prevention: safety round/check completed  Taken 6/27/2025 0000 by Chloe Colorado RN  Safety Promotion/Fall Prevention: safety round/check completed  Taken 6/26/2025 2200 by Chloe Colorado RN  Safety Promotion/Fall Prevention: safety round/check completed  Taken 6/26/2025 2000 by Chloe Colorado RN  Safety Promotion/Fall Prevention: safety round/check completed   Goal Outcome Evaluation:

## 2025-06-27 NOTE — PROGRESS NOTES
Intensive Care Follow-up     Hospital:  LOS: 8 days   Ms. Evelia Ryan, 50 y.o. female is followed for:   Brain mass          Subjective   Interval History:    Patient seen and examined. She is anxious and tearful. She has been walking in the candelario and to the bathroom with standby assistance. Still has complaints of constipation.          The patient's past medical, surgical and social history were reviewed and updated in Epic as appropriate.       Objective     Infusions:  niCARdipine, 5-15 mg/hr, Last Rate: Stopped (06/26/25 0120)      Medications:  dexAMETHasone, 4 mg, Intravenous, Q6H  diazePAM, 5 mg, Oral, TID  levETIRAcetam, 500 mg, Oral, BID  melatonin, 10 mg, Oral, Nightly  mupirocin, 1 Application, Each Nare, BID  sodium chloride, 10 mL, Intravenous, Q12H  sodium chloride, 10 mL, Intravenous, Q12H      I reviewed the patient's medications.    Vital Sign Min/Max for last 24 hours  Temp  Min: 98.3 °F (36.8 °C)  Max: 98.3 °F (36.8 °C)   BP  Min: 102/55  Max: 127/82   Pulse  Min: 46  Max: 62   Resp  Min: 16  Max: 18   SpO2  Min: 90 %  Max: 96 %   No data recorded       Input/Output for last 24 hour shift  06/26 0701 - 06/27 0700  In: 299 [P.O.:299]  Out: 665 [Urine:575; Drains:90]        Physical Exam:  GENERAL: Patient lying in bed and conversant. No acute distress. Easily tearful.   HEENT: Normocephalic and atraumatic. Trachea midline. PER. EOM WNL. Dressing CDI.    LUNGS: Chest rise of normal depth and symmetric. Lungs clear to auscultation bilaterally. No wheezes, rhonchi, or rales.   HEART: S1,S2 detected. Regular rate and rhythm. No rub, murmur, or gallop.   ABDOMEN: Soft, round, nondistended, and nontender. Bowel sounds present.   EXTREMITIES: No clubbing, edema, or cyanosis. Peripheral pulses present. Skin warm and dry.    NEURO/PSYCH: Alert and oriented. Follows commands. Moves all extremities. No focal deficits.      Results from last 7 days   Lab Units 06/26/25  0415 06/25/25  0446 06/24/25  0611    WBC 10*3/mm3 15.69* 9.61 10.11   HEMOGLOBIN g/dL 13.3 15.2 15.4   PLATELETS 10*3/mm3 296 316 338     Results from last 7 days   Lab Units 06/26/25  0415 06/25/25  0446 06/24/25  0611   SODIUM mmol/L 137 138 136   POTASSIUM mmol/L 3.8 3.9 3.9   CO2 mmol/L 27.9 29.0 22.9   BUN mg/dL 13.7 17.0 13.7   CREATININE mg/dL 0.59 0.58 0.63   MAGNESIUM mg/dL 2.1  --   --    PHOSPHORUS mg/dL 3.4  --   --    GLUCOSE mg/dL 151* 126* 110*     Estimated Creatinine Clearance: 137.7 mL/min (by C-G formula based on SCr of 0.59 mg/dL).          I reviewed the patient's new clinical results.  I reviewed the patient's new imaging results/reports including actual images and agree with reports.     Imaging Results (Last 24 Hours)       ** No results found for the last 24 hours. **            Assessment & Plan   Impression      Brain mass    Anxiety and depression    Ms. Ryan is a 50 year old female with pmhx of insomnia and anxiety who presented to [facility] on 6/18 after being found to have an occipital brain mass with mild mass effect on the posterior falx and posterior horn left lateral ventricle and mild edema. Patient was admitted to medicine team and started on steroids. Neurosurgery was consulted and followed throughout admission. On 6/24, patient underwent angiogram with emobolization with PVA particles and microcoils of the large left meningioma.   On 6/25, patient was taken to the OR where she underwent left occipital craniotomy for resection of brain tumor.      Post-operatively, patient was transferred to the ICU for higher level of care.      Postop CT H is stable. MRI brain- small amount of residual tumor situated around the sinus. Intraoperative pathology consistent with meningioma.      Neurosurgery plans for SRS in 3 months.      Has scheduled Valium for anxiety. No BM x1 week. She is walking with standby assistance.      Plan        For brain mass:  post-operative care per neurosurgery. Continue Decadron. Continue Keppra for  seizure prophylaxis.  Will follow up with neurosurgery in clinic 2 weeks after discharge. Neurosurgery plans for SRS in 3 months.   For Anxiety/Insomnia: continue scheduled Valium.   PO diet. Encourage bowel regimen for constipation.   Continue PT/OT     DVT Prophylaxis: SCDS   Dispo: Transfer to telemetry. Probable discharge home tomorrow.     Time spent: 35 minutes   Plan of care and goals reviewed with multidisciplinary/antibiotic stewardship team during rounds.   I discussed the patient's findings and my recommendations with patient and family.      Radha Matos PA-C   Pulmonary and Critical Care Medicine

## 2025-06-27 NOTE — PLAN OF CARE
Goal Outcome Evaluation:  Plan of Care Reviewed With: patient, spouse, child        Progress: improving  Outcome Evaluation: Pt w/ increased overall ambulation distance and completion of stair training activity this date compared to previous session. However, continues to present w balance deficits, decreased functional endurance, generalized weakness and increased falls risk compared to reported baseline warranting continued skilled IP PT interventions to promote maximal IND w/ functional mobility. SBA to EOB and stand; CGA to ambulate in hallway. PT continuing to recommend home w/ assist based on current presention.    Anticipated Discharge Disposition (PT): home with assist

## 2025-06-27 NOTE — PROGRESS NOTES
"          Clinical Nutrition Assessment     Patient Name: Evelia Ryan  YOB: 1975  MRN: [id number]  Date of Encounter: 06/27/25 11:08 EDT  Admission date: 6/18/2025  Reason for Visit: LOS    Assessment   Nutrition Assessment   Admission Diagnosis:  Brain mass [G93.89]    Problem List:    Brain mass    Anxiety and depression      PMH:   She  has a past medical history of Anxiety and depression (06/17/2014).    PSH:  She  has a past surgical history that includes Hysterectomy; Gastric Sleeve; Appendectomy; Cholecystectomy; Cerebral angiogram (N/A, 6/24/2025); and Craniotomy for Tumor (Left, 6/25/2025).    Applicable Nutrition History:     Anthropometrics     Height: Height: 177.8 cm (70\")  Last Filed Weight: Weight: 92.8 kg (204 lb 9.4 oz) (06/27/25 0600)  Method: Weight Method: Stated  BMI: BMI (Calculated): 29.4    UBW:  ~205 lb per limited data in EMR  Weight change: No significant changes    Nutrition Focused Physical Exam    Date:  6/27       Pt does not meet criteria for malnutrition diagnosis, at this time.      Subjective   Reported/Observed/Food/Nutrition Related History:     Visited with patient at bedside. She reports no significant changes or concerns with nutrition. Eating a bit less than usual due to constipation, no BM x1 week. Encouraged fiber and fluid. Agreeable to prune juice. Pt denies further dietary needs/preferences or nutritional questions/concerns at this time, NKFA.     Current Nutrition Prescription   PO: Diet: Regular/House; Fluid Consistency: Thin (IDDSI 0)  Oral Nutrition Supplement:   Intake: Insufficient data    Assessment & Plan   Nutrition Diagnosis   Date:  6/27            Updated:    Problem Inadequate energy intake    Etiology constipation   Signs/Symptoms Pt report/diet hx eating slightly less than usual 2/2 constipation   Status: New    Goal:   Nutrition to support treatment and Increase intake    Nutrition Intervention      Follow treatment progress, Care plan " reviewed, Advise alternate selection, Advised available snacks, Interview for preferences, Menu provided, Encourage intake    Encourage PO as tolerated, will send prune juice to help with constipation.     Monitoring/Evaluation:   Per protocol, PO intake, Weight, Symptoms, POC/GOC    Carmella Trivedi RD, CNSC  Time Spent: 30m

## 2025-06-27 NOTE — PROGRESS NOTES
[facility] Neurosurgery Services  PROGRESS NOTE    Patient Name: Evelia Ryan  : 1975  MRN: [id number]    Date of Admission: 2025  Length of Stay: 8  Primary Care Physician: Nina Cheng APRN    Subjective     CC: Status post left occipital craniotomy    Admission diagnosis: Brain mass [G93.89]     HPI: Evelia Ryan is a 50 y.o. female who is  s/p embolization/angiogram with Dr. Gu on 2025 and left occipital craniotomy and mass excision with Dr. Mitchell on 2025.  Intraoperative pathology consistent with meningioma and postoperative CT and MRI have been stable.  Patient has been working with PT/OT who recommend discharge to home with assistance when ready.  She is still struggling with incisional pain and headache/neck pain as well as constipation.  She notes some improvement in muscle spasms and anxiety with addition of Valium.  No events overnight, awaiting a bed in telemetry for transfer.    Review of Systems  ROS is negative except as mentioned in the HPI.    Objective     Vital Signs:   Temp:  [98.3 °F (36.8 °C)] 98.3 °F (36.8 °C)  Heart Rate:  [46-67] 50  Resp:  [16-18] 18  BP: (102-129)/(53-82) 116/66     Pulse  Av.7  Min: 46  Max: 67  Systolic (24hrs), Av , Min:102 , Max:129     Diastolic (24hrs), Av, Min:53, Max:82    Temp (24hrs), Av.3 °F (36.8 °C), Min:98.3 °F (36.8 °C), Max:98.3 °F (36.8 °C)      No intake/output data recorded.    Results Reviewed:  I have personally reviewed current lab, radiology, and data and agree.    Results from last 7 days   Lab Units 25  0415 25  0446 25  0611   WBC 10*3/mm3 15.69* 9.61 10.11   HEMOGLOBIN g/dL 13.3 15.2 15.4   HEMATOCRIT % 38.6 43.4 43.9   PLATELETS 10*3/mm3 296 316 338   INR   --  0.97 0.93     Results from last 7 days   Lab Units 25  0415 25  0446 25  0611   SODIUM mmol/L 137 138 136   POTASSIUM mmol/L 3.8 3.9 3.9   CHLORIDE mmol/L 97* 99 99   CO2  mmol/L 27.9 29.0 22.9   BUN mg/dL 13.7 17.0 13.7   CREATININE mg/dL 0.59 0.58 0.63   GLUCOSE mg/dL 151* 126* 110*   CALCIUM mg/dL 8.8 9.2 9.6     Estimated Creatinine Clearance: 137.7 mL/min (by C-G formula based on SCr of 0.59 mg/dL).    Microbiology Results Abnormal       None            Imaging Results (Last 24 Hours)       ** No results found for the last 24 hours. **              I have reviewed the medications:  Scheduled Meds:dexAMETHasone, 4 mg, Intravenous, Q6H  diazePAM, 5 mg, Oral, TID  levETIRAcetam, 500 mg, Oral, BID  melatonin, 10 mg, Oral, Nightly  mupirocin, 1 Application, Each Nare, BID  sodium chloride, 10 mL, Intravenous, Q12H  sodium chloride, 10 mL, Intravenous, Q12H      PRN Meds:  acetaminophen **OR** acetaminophen **OR** acetaminophen    senna-docusate sodium **AND** polyethylene glycol **AND** bisacodyl **AND** bisacodyl    Calcium Replacement - Follow Nurse / BPA Driven Protocol    HYDROcodone-acetaminophen    HYDROmorphone    ibuprofen    Magnesium Standard Dose Replacement - Follow Nurse / BPA Driven Protocol    nitroglycerin    ondansetron    Phosphorus Replacement - Follow Nurse / BPA Driven Protocol    Potassium Replacement - Follow Nurse / BPA Driven Protocol    [COMPLETED] Insert Peripheral IV **AND** sodium chloride    sodium chloride    sodium chloride    sodium chloride    Physical Exam:   Today I find patient reclined in bed with family at bedside  Her RONI drain has been removed  she Is alert and oriented, in no acute distress  Able to move all four extremities with no strength or sensory deficits.    Patient appears comfortable, but tearful and anxious. Otherwise no acute distress.   Speech f/c  Opens eyes spontaneously  EOM intact bilaterally.   Pupils 3mm reactive bilaterally  Face symmetric  Incision dressing is dry and intact. @NEUROSURG PE@     Assessment / Plan         Brain mass    Anxiety and depression     Patient is doing well postoperatively, encouraged her to discuss  constipation with nursing as she has medications available as needed.  We will continue scheduled Valium 3 times a day for muscle spasms as well as anxiety.  Discussed some coping mechanisms for anxiety/panic.  She was started on Keppra for seizure prophylaxis yesterday which we will continue for 30 days.  Please continue PT/OT she may transfer to telemetry when a bed is available and discharge to home with family when medically ready. Plan for SRS in 3 months to address the small residual tumor. Will follow up in clinic in 2 weeks.     Disposition: I expect the patient to be discharged to home with family today or tomorrow.      Electronically signed by Mary Estevez PA-C, 06/27/25, 09:46 EDT.

## 2025-06-27 NOTE — THERAPY TREATMENT NOTE
Patient Name: Evelia Ryan  : 1975    MRN: [id number]                              Today's Date: 2025       Admit Date: 2025    Visit Dx:     ICD-10-CM ICD-9-CM   1. Brain mass  G93.89 348.89     Patient Active Problem List   Diagnosis    Anxiety and depression    Brain mass     Past Medical History:   Diagnosis Date    Anxiety and depression 2014     Past Surgical History:   Procedure Laterality Date    APPENDECTOMY      CEREBRAL ANGIOGRAM N/A 2025    Procedure: Cerebral angiogram with possible embolization - Right radial access;  Surgeon: Roney Gu MD;  Location:  [facility];  Service: Interventional Radiology;  Laterality: N/A;    CHOLECYSTECTOMY      CRANIOTOMY FOR TUMOR Left 2025    Procedure: CRANIOTOMY FOR TUMOR STEREOTACTIC WITH STEALTH, LEFT OCCIPITAL;  Surgeon: Benito Mitchell MD;  Location: [facility];  Service: Neurosurgery;  Laterality: Left;    GASTRIC SLEEVE LAPAROSCOPIC      HYSTERECTOMY        General Information       Row Name 25 0942          Physical Therapy Time and Intention    Document Type therapy note (daily note)  -TT     Mode of Treatment physical therapy;individual therapy  -TT       Row Name 25 0942          General Information    Patient Profile Reviewed yes  -TT     Existing Precautions/Restrictions fall  -TT     Barriers to Rehab medically complex  -TT       Row Name 25 0942          Cognition    Orientation Status (Cognition) oriented x 3  -TT       Row Name 25 0942          Safety Issues/Impairments Affecting Functional Mobility    Safety Issues Affecting Function (Mobility) awareness of need for assistance;insight into deficits/self-awareness;safety precaution awareness;safety precautions follow-through/compliance;sequencing abilities  -TT     Impairments Affecting Function (Mobility) endurance/activity tolerance;pain;strength;visual/perceptual  -TT               User Key  (r) = Recorded  By, (t) = Taken By, (c) = Cosigned By      Initials Name Provider Type    TT Gabriela Mendoza, PT Physical Therapist                   Mobility       Row Name 06/27/25 0942          Bed Mobility    Bed Mobility supine-sit-supine  -TT     Supine-Sit-Supine Ewing (Bed Mobility) standby assist;verbal cues  -TT     Assistive Device (Bed Mobility) bed rails;head of bed elevated  -TT     Comment, (Bed Mobility) Cues for sequencing and improved initiation. Increased time d/t slowed pacing; denied onset of dizziness w/ positional change.  -TT       Row Name 06/27/25 0942          Transfers    Comment, (Transfers) 2 STS w/o UE support: 1 from EOB and 1 from toilet. Increased time required to allow use of restroom.  -TT       Row Name 06/27/25 0942          Sit-Stand Transfer    Sit-Stand Ewing (Transfers) standby assist;verbal cues  -TT     Comment, (Sit-Stand Transfer) Cues for positioning BLE under FER and increased anterior weight shift. Mild L lateral lean noted w/ transition to standing from toilet; no overt LOB observed an pt self-correcting w/ no more than SBA. Continued to deny dizziness w/ positional change.  -TT       Row Name 06/27/25 0942          Gait/Stairs (Locomotion)    Ewing Level (Gait) contact guard;1 person assist  -TT     Patient was able to Ambulate yes  -TT     Distance in Feet (Gait) 350  -TT     Deviations/Abnormal Patterns (Gait) bilateral deviations;base of support, narrow;karli decreased;gait speed decreased;stride length decreased  -TT     Bilateral Gait Deviations decreased arm swing;forward flexed posture;heel strike decreased  -TT     Ewing Level (Stairs) contact guard  -TT     Handrail Location (Stairs) left side (ascending);right side (descending)  -TT     Number of Steps (Stairs) 2  -TT     Ascending Technique (Stairs) step-to-step  -TT     Descending Technique (Stairs) step-to-step  -TT     Stairs, Safety Issues sequencing ability decreased  -TT     Comment,  (Gait/Stairs) Cues for resting BUE at sides, forward gaze, increased b/l step length and progression of activity as able to tolerate. Continued placement of BUE across abdomen noted w/ semi-gaurded posture following cues. No overt LOB observed, however, mild lateral sway w/ activity requiring CGA for improved safety. W/ stair training, cues for appropraite sequencing and step-to gait pattern.  -TT               User Key  (r) = Recorded By, (t) = Taken By, (c) = Cosigned By      Initials Name Provider Type    TT Gabriela Mendoza PT Physical Therapist                   Obj/Interventions       Row Name 06/27/25 0947          Motor Skills    Motor Skills functional endurance  -TT     Functional Endurance Below baseline  -TT       Row Name 06/27/25 0947          Balance    Balance Assessment sitting static balance;sitting dynamic balance;standing static balance;standing dynamic balance  -TT     Static Sitting Balance standby assist  -TT     Dynamic Sitting Balance standby assist  -TT     Position, Sitting Balance unsupported;sitting edge of bed  -TT     Static Standing Balance standby assist  -TT     Dynamic Standing Balance verbal cues;contact guard  -TT     Position/Device Used, Standing Balance unsupported  -TT     Balance Interventions sitting;standing;sit to stand;supported;static;dynamic;dynamic reaching;occupation based/functional task;weight shifting activity  -TT               User Key  (r) = Recorded By, (t) = Taken By, (c) = Cosigned By      Initials Name Provider Type    TT Gabriela Mendoza PT Physical Therapist                   Goals/Plan    No documentation.                  Clinical Impression       Row Name 06/27/25 0948          Pain    Pretreatment Pain Rating 8/10  -TT     Posttreatment Pain Rating 8/10  -TT     Pain Side/Orientation generalized  -TT     Pain Management Interventions activity modification encouraged;exercise or physical activity utilized;positioning techniques utilized  -TT      Response to Pain Interventions activity participation with tolerable pain  -TT       Row Name 06/27/25 0948          Plan of Care Review    Plan of Care Reviewed With patient;spouse;child  -TT     Progress improving  -TT     Outcome Evaluation Pt w/ increased overall ambulation distance and completion of stair training activity this date compared to previous session. However, continues to present w balance deficits, decreased functional endurance, generalized weakness and increased falls risk compared to reported baseline warranting continued skilled IP PT interventions to promote maximal IND w/ functional mobility. SBA to EOB and stand; CGA to ambulate in hallway. PT continuing to recommend home w/ assist based on current presention.  -TT       Row Name 06/27/25 0948          Therapy Assessment/Plan (PT)    Patient/Family Therapy Goals Statement (PT) Return home  -TT     Rehab Potential (PT) good  -TT     Criteria for Skilled Interventions Met (PT) yes;meets criteria;skilled treatment is necessary  -TT     Therapy Frequency (PT) daily  -TT       Row Name 06/27/25 0948          Vital Signs    Pre Systolic BP Rehab 114  -TT     Pre Treatment Diastolic BP 59  -TT     Post Systolic BP Rehab 124  -TT     Post Treatment Diastolic BP 80  -TT     Pretreatment Heart Rate (beats/min) 67  -TT     Posttreatment Heart Rate (beats/min) 56  -TT     Pre SpO2 (%) 93  -TT     O2 Delivery Pre Treatment room air  -TT     O2 Delivery Intra Treatment room air  -TT     Post SpO2 (%) 93  -TT     O2 Delivery Post Treatment room air  -TT     Pre Patient Position Supine  -TT     Intra Patient Position Standing  -TT     Post Patient Position Supine  -TT       Row Name 06/27/25 0948          Positioning and Restraints    Pre-Treatment Position in bed  -TT     Post Treatment Position bed  -TT     In Bed notified nsg;call light within reach;encouraged to call for assist;exit alarm on;with family/caregiver;SCD pump applied;side rails up x2  -TT                User Key  (r) = Recorded By, (t) = Taken By, (c) = Cosigned By      Initials Name Provider Type    TT Gabriela Mendoza, PT Physical Therapist                   Outcome Measures       Row Name 06/27/25 0951 06/27/25 0800       How much help from another person do you currently need...    Turning from your back to your side while in flat bed without using bedrails? 4  -TT 4  -SW    Moving from lying on back to sitting on the side of a flat bed without bedrails? 4  -TT 4  -SW    Moving to and from a bed to a chair (including a wheelchair)? 3  -TT 3  -SW    Standing up from a chair using your arms (e.g., wheelchair, bedside chair)? 4  -TT 3  -SW    Climbing 3-5 steps with a railing? 3  -TT 3  -SW    To walk in hospital room? 3  -TT 3  -SW    AM-PAC 6 Clicks Score (PT) 21  -TT 20  -SW    Highest Level of Mobility Goal Walk 10 Steps or More-6  -TT Walk 10 Steps or More-6  -SW      Row Name 06/27/25 0951          Functional Assessment    Outcome Measure Options AM-PAC 6 Clicks Basic Mobility (PT)  -TT               User Key  (r) = Recorded By, (t) = Taken By, (c) = Cosigned By      Initials Name Provider Type    Radha Baer RN Registered Nurse    Gabriela Kimbrough, PT Physical Therapist                                 Physical Therapy Education       Title: PT OT SLP Therapies (In Progress)       Topic: Physical Therapy (In Progress)       Point: Mobility training (Done)       Learning Progress Summary            Patient Acceptance, E, VU by TT at 6/27/2025 0951    Acceptance, E, VU by TT at 6/26/2025 1200   Family Acceptance, E, VU by TT at 6/27/2025 0951                      Point: Home exercise program (Not Started)       Learner Progress:  Not documented in this visit.              Point: Body mechanics (Done)       Learning Progress Summary            Patient Acceptance, E, VU by TT at 6/27/2025 0951    Acceptance, E, VU by TT at 6/26/2025 1200   Family Acceptance, E, VU by TT at 6/27/2025  0951                      Point: Precautions (Done)       Learning Progress Summary            Patient Acceptance, E, VU by TT at 6/27/2025 0951    Acceptance, E, VU by TT at 6/26/2025 1200   Family Acceptance, E, VU by TT at 6/27/2025 0951                                      User Key       Initials Effective Dates Name Provider Type Discipline    TT 05/30/25 -  Gabriela Mendoza, PT Physical Therapist PT                  PT Recommendation and Plan  Planned Therapy Interventions (PT): balance training, bed mobility training, home exercise program, gait training, patient/family education, postural re-education, ROM (range of motion), stair training, strengthening, stretching, transfer training  Progress: improving  Outcome Evaluation: Pt w/ increased overall ambulation distance and completion of stair training activity this date compared to previous session. However, continues to present w balance deficits, decreased functional endurance, generalized weakness and increased falls risk compared to reported baseline warranting continued skilled IP PT interventions to promote maximal IND w/ functional mobility. SBA to EOB and stand; CGA to ambulate in hallway. PT continuing to recommend home w/ assist based on current presention.     Time Calculation:   PT Evaluation Complexity  History, PT Evaluation Complexity: 1-2 personal factors and/or comorbidities  Examination of Body Systems (PT Eval Complexity): total of 3 or more elements  Clinical Presentation (PT Evaluation Complexity): evolving  Clinical Decision Making (PT Evaluation Complexity): moderate complexity  Overall Complexity (PT Evaluation Complexity): moderate complexity     PT Charges       Row Name 06/27/25 0951             Time Calculation    Start Time 0915  -TT      PT Received On 06/27/25  -TT         Timed Charges    16351 - PT Therapeutic Activity Minutes 24  -TT         Total Minutes    Timed Charges Total Minutes 24  -TT       Total Minutes 24  -TT                 User Key  (r) = Recorded By, (t) = Taken By, (c) = Cosigned By      Initials Name Provider Type    TT Gabriela Mendoza, PT Physical Therapist                  Therapy Charges for Today       Code Description Service Date Service Provider Modifiers Qty    [id number]  PT EVAL MOD COMPLEXITY 4 6/26/2025 Gabriela Mendoza, PT GP 1    [id number]  PT THERAPEUTIC ACT EA 15 MIN 6/27/2025 Gabriela Mendoza, PT GP 2            PT G-Codes  Outcome Measure Options: AM-PAC 6 Clicks Basic Mobility (PT)  AM-PAC 6 Clicks Score (PT): 21  AM-PAC 6 Clicks Score (OT): 21  PT Discharge Summary  Anticipated Discharge Disposition (PT): home with assist    Gabriela Mendoza PT  6/27/2025

## 2025-06-27 NOTE — CASE MANAGEMENT/SOCIAL WORK
Continued Stay Note  [facility]     Patient Name: Evelia Ryan  MRN: [id number]  Today's Date: 6/27/2025    Admit Date: 6/18/2025    Plan: Home with Family   Discharge Plan       Row Name 06/27/25 1144       Plan    Plan Home with Family    Patient/Family in Agreement with Plan yes    Plan Comments I have met [facility] Mrs. Ryan at the bedside this morning to discuss the discharge plan and PT/OT recommendations for home with assist.  She states that her  will be available to provide assistance and transportation as needed.  She is concerned that he does work during the day.  I have offered to arrange Home Health services however she declines at this time.  CM will cont to follow plan of care and provide assistance with discharge planning as recommendations become available.    Final Discharge Disposition Code 01 - home or self-care               Expected Discharge Date and Time       Expected Discharge Date Expected Discharge Time    Jun 28, 2025               Katerin Holman RN

## 2025-06-27 NOTE — PAYOR COMM NOTE
"Clinical update 2025   Evelia Daugherty (50 y.o. Female)       Date of Birth   1975    Social Security Number       Address   [address] [place] [address]    Home Phone       MRN   [id number]       Shinto   Amish    Marital Status                               Admission Date   2025    Admission Type   Emergency    Admitting Provider   Inocencio Melendez MD    Attending Provider   Inocencio Melendez MD    Department, Room/Bed   [facility] 2B ICU, N235/1       Discharge Date       Discharge Disposition       Discharge Destination                                 Attending Provider: Inocencio Melendez MD    Allergies: No Known Allergies    Isolation: None   Infection: None   Code Status: CPR    Ht: 177.8 cm (70\")   Wt: 92.8 kg (204 lb 9.4 oz)    Admission Cmt: None   Principal Problem: Brain mass [G93.89]                   Active Insurance as of 2025       Primary Coverage       Payor Plan Insurance Group Employer/Plan Group    [employer]HEM BLUE CROSS ANTHEM BLUE CROSS BLUE SHIELD PPO [id number]       Payor Plan Address Payor Plan Phone Number Payor Plan Fax Number Effective Dates    [address] [phone]  2008 - None Entered    [place] [address]         Subscriber Name Subscriber Birth Date Member ID       Abhinav Daugherty 1975 [id number]                     Emergency Contacts        (Rel.) Home Phone Work Phone Mobile Phone    ABHINAV DAUGHERTY (Spouse) [phone] -- [phone]    ELLY DAUGHERTY (Son) [phone] -- [phone]                 Physician Progress Notes (last 72 hours)        Mary Estevez PA at 25 0946                    [facility] Neurosurgery Services  PROGRESS NOTE    Patient Name: Evelia Daugherty  : 1975  MRN: [id number]    Date of Admission: 2025  Length of Stay: 8  Primary Care Physician: Nina Cheng APRN    Subjective     CC: Status post left occipital " craniotomy    Admission diagnosis: Brain mass [G93.89]     HPI: Evelia Ryan is a 50 y.o. female who is  s/p embolization/angiogram with Dr. Gu on 2025 and left occipital craniotomy and mass excision with Dr. Mitchell on 2025.  Intraoperative pathology consistent with meningioma and postoperative CT and MRI have been stable.  Patient has been working with PT/OT who recommend discharge to home with assistance when ready.  She is still struggling with incisional pain and headache/neck pain as well as constipation.  She notes some improvement in muscle spasms and anxiety with addition of Valium.  No events overnight, awaiting a bed in telemetry for transfer.    Review of Systems  ROS is negative except as mentioned in the HPI.    Objective     Vital Signs:   Temp:  [98.3 °F (36.8 °C)] 98.3 °F (36.8 °C)  Heart Rate:  [46-67] 50  Resp:  [16-18] 18  BP: (102-129)/(53-82) 116/66     Pulse  Av.7  Min: 46  Max: 67  Systolic (24hrs), Av , Min:102 , Max:129     Diastolic (24hrs), Av, Min:53, Max:82    Temp (24hrs), Av.3 °F (36.8 °C), Min:98.3 °F (36.8 °C), Max:98.3 °F (36.8 °C)      No intake/output data recorded.    Results Reviewed:  I have personally reviewed current lab, radiology, and data and agree.    Results from last 7 days   Lab Units 25  0415 25  0446 25  0611   WBC 10*3/mm3 15.69* 9.61 10.11   HEMOGLOBIN g/dL 13.3 15.2 15.4   HEMATOCRIT % 38.6 43.4 43.9   PLATELETS 10*3/mm3 296 316 338   INR   --  0.97 0.93     Results from last 7 days   Lab Units 25  0415 25  0446 25  0611   SODIUM mmol/L 137 138 136   POTASSIUM mmol/L 3.8 3.9 3.9   CHLORIDE mmol/L 97* 99 99   CO2 mmol/L 27.9 29.0 22.9   BUN mg/dL 13.7 17.0 13.7   CREATININE mg/dL 0.59 0.58 0.63   GLUCOSE mg/dL 151* 126* 110*   CALCIUM mg/dL 8.8 9.2 9.6     Estimated Creatinine Clearance: 137.7 mL/min (by C-G formula based on SCr of 0.59 mg/dL).    Microbiology Results Abnormal       None             Imaging Results (Last 24 Hours)       ** No results found for the last 24 hours. **              I have reviewed the medications:  Scheduled Meds:dexAMETHasone, 4 mg, Intravenous, Q6H  diazePAM, 5 mg, Oral, TID  levETIRAcetam, 500 mg, Oral, BID  melatonin, 10 mg, Oral, Nightly  mupirocin, 1 Application, Each Nare, BID  sodium chloride, 10 mL, Intravenous, Q12H  sodium chloride, 10 mL, Intravenous, Q12H      PRN Meds:  acetaminophen **OR** acetaminophen **OR** acetaminophen    senna-docusate sodium **AND** polyethylene glycol **AND** bisacodyl **AND** bisacodyl    Calcium Replacement - Follow Nurse / BPA Driven Protocol    HYDROcodone-acetaminophen    HYDROmorphone    ibuprofen    Magnesium Standard Dose Replacement - Follow Nurse / BPA Driven Protocol    nitroglycerin    ondansetron    Phosphorus Replacement - Follow Nurse / BPA Driven Protocol    Potassium Replacement - Follow Nurse / BPA Driven Protocol    [COMPLETED] Insert Peripheral IV **AND** sodium chloride    sodium chloride    sodium chloride    sodium chloride    Physical Exam:   Today I find patient reclined in bed with family at bedside  Her RONI drain has been removed  she Is alert and oriented, in no acute distress  Able to move all four extremities with no strength or sensory deficits.    Patient appears comfortable, but tearful and anxious. Otherwise no acute distress.   Speech f/c  Opens eyes spontaneously  EOM intact bilaterally.   Pupils 3mm reactive bilaterally  Face symmetric  Incision dressing is dry and intact. @NEUROSURG PE@     Assessment / Plan         Brain mass    Anxiety and depression     Patient is doing well postoperatively, encouraged her to discuss constipation with nursing as she has medications available as needed.  We will continue scheduled Valium 3 times a day for muscle spasms as well as anxiety.  Discussed some coping mechanisms for anxiety/panic.  She was started on Keppra for seizure prophylaxis yesterday which we will  "continue for 30 days.  Please continue PT/OT she may transfer to telemetry when a bed is available and discharge to home with family when medically ready. Plan for SRS in 3 months to address the small residual tumor. Will follow up in clinic in 2 weeks.     Disposition: I expect the patient to be discharged to home with family today or tomorrow.      Electronically signed by Mary Estevez PA-C, 06/27/25, 09:46 EDT.          Electronically signed by Mary Estevez PA at 06/27/25 1016       Shaista Woodson APRN at 06/26/25 1149            INTENSIVIST NOTE     SUBJECTIVE     No acute issues overnight. Patient has some blurred vision which is the same as her baseline. No new neurologic changes.     The patient's relevant past medical, surgical and social history were reviewed and updated in Epic as appropriate.       OBJECTIVE     Vital Sign Min/Max for last 24 hours  Temp  Min: 96.8 °F (36 °C)  Max: 98.9 °F (37.2 °C)   BP  Min: 84/60  Max: 129/77   Pulse  Min: 45  Max: 84   Resp  Min: 14  Max: 18   SpO2  Min: 93 %  Max: 100 %   No data recorded   Weight  Min: 92.1 kg (203 lb 0.7 oz)  Max: 92.1 kg (203 lb 0.7 oz)      Intake/Output Summary (Last 24 hours) at 6/26/2025 1149  Last data filed at 6/26/2025 0900  Gross per 24 hour   Intake 2309 ml   Output 5870 ml   Net -3561 ml      Flowsheet Rows      Flowsheet Row First Filed Value   Admission Height 177.8 cm (70\") Documented at 06/18/2025 1926   Admission Weight 95.3 kg (210 lb) Documented at 06/18/2025 1926               06/18/25  1926 06/26/25  0600   Weight: 95.3 kg (210 lb) 92.1 kg (203 lb 0.7 oz)            Objective:  General Appearance:  Comfortable, well-appearing, in pain, not in pain and in no acute distress.    Vital signs: (most recent): Blood pressure 120/64, pulse 61, temperature 98.9 °F (37.2 °C), temperature source Axillary, resp. rate 16, height 177.8 cm (70\"), weight 92.1 kg (203 lb 0.7 oz), SpO2 95%.  Vital signs are normal.    Lungs:  Normal " effort and normal respiratory rate.  Breath sounds clear to auscultation.    Heart: Normal rate.  Regular rhythm.  S1 normal and S2 normal.    Abdomen: Abdomen is soft.  Bowel sounds are normal.     Pulses: Distal pulses are intact.    Neurological: Patient is alert.  Normal strength.    Skin:  Warm and dry.  (Crani site- dressing c/d/I. RONI drain with bloody output. )              I reviewed the patient's new clinical results.  I reviewed the patient's new imaging results/reports including actual images and agree with reports.    INFUSIONS  lactated ringers, 9 mL/hr  niCARdipine, 5-15 mg/hr, Last Rate: Stopped (06/26/25 0120)        Results from last 7 days   Lab Units 06/26/25  0415 06/25/25  0446 06/24/25  0611   WBC 10*3/mm3 15.69* 9.61 10.11   HEMOGLOBIN g/dL 13.3 15.2 15.4   HEMATOCRIT % 38.6 43.4 43.9   PLATELETS 10*3/mm3 296 316 338     Results from last 7 days   Lab Units 06/26/25  0415 06/25/25  0446 06/24/25  0611   SODIUM mmol/L 137 138 136   POTASSIUM mmol/L 3.8 3.9 3.9   CHLORIDE mmol/L 97* 99 99   CO2 mmol/L 27.9 29.0 22.9   BUN mg/dL 13.7 17.0 13.7   GLUCOSE mg/dL 151* 126* 110*   CREATININE mg/dL 0.59 0.58 0.63   MAGNESIUM mg/dL 2.1  --   --    CALCIUM mg/dL 8.8 9.2 9.6       I reviewed the patient's medications.    Assessment & Plan   ASSESSMENT      Active Hospital Problems    Diagnosis     **Brain mass     Anxiety and depression         Ms. Ryan is a 50 year old female with pmhx of insomnia and anxiety who presented to [facility] on 6/18 after being found to have an occipital brain mass with mild mass effect on the posterior falx and posterior horn left lateral ventricle and mild edema. Patient was admitted to medicine team and started on steroids. Neurosurgery was consulted and followed throughout admission. On 6/24, patient underwent angiogram with emobolization with PVA particles and microcoils of the large left meningioma.   On 6/25, patient was taken to the OR where she underwent left occipital  craniotomy for resection of brain tumor.     Post-operatively, patient was transferred to the ICU for higher level of care.     Postop CT H is stable. MRI brain- small amount of residual tumor situated around the sinus.     Neurosurgery plans for SRS in 3 months.       PLAN     Brain mass, s/p embolization of large left occipital meningioma  and left occipital crani/resection of brain tumor : post-op care per neurosurgery. Await post-op imaging ordered per neurosurgery. Continue Decadron. PRN Cardene to maintain SBP < 140 mmHg. Keppra added today as prophylaxis.   Anxiety  Insomnia: Valium TID.  DVT prophy: SCDs  Dispo: transfer to tele          I discussed the patient's findings and my recommendations with patient     Plan of care and goals reviewed with multidisciplinary team at daily rounds.    DAVID Greer  Pulmonary and Critical Care Medicine  25 11:49 EDT         Electronically signed by Shaista Woodson APRN at 25 1304       Jaleesa Carlson PA-C at 25 0856       Attestation signed by Benito Mitchell MD at 25 1027      I have reviewed this documentation and agree.                      Chief complaint: s/p left occipital craniotomy     Admit Diagnosis:   Brain mass [G93.89]     Subjective: Patient reports increasing incisional/head/neck pain overnight but otherwise she is doing well. She denies any new symptoms since surgery.     Objective:    Vitals:    25 0700   BP: 101/56   Pulse: (!) 46   Resp:    Temp:    SpO2: 93%     Pulse  Av.2  Min: 46  Max: 84  Systolic (24hrs), Av , Min:84 , Max:165     Diastolic (24hrs), Av, Min:54, Max:95    Temp (24hrs), Av.9 °F (36.6 °C), Min:96.8 °F (36 °C), Max:98.3 °F (36.8 °C)      Patient appears comfortable, resting, lying in bed. No acute distress.   Awake, alert and oriented x 3  Speech f/c  Opens eyes spontaneously  EOM intact bilaterally. Visual field testing without deficit.    Pupils 3mm reactive bilaterally  Face symmetric  Tongue midline  Motor exam shows 5/5 strength in all 4 extremities. Moves all 4 to command.   Normal sensation to light touch in all 4 extremities  No Pitts's or clonus bilaterally  No pronator drift or dysmetria  Gait not assessed  Incision dressing is dry and intact. RONI drain with about 20 of output this morning.     Lab Results   Component Value Date     06/26/2025       A/P:   Admit Diagnosis:   Brain mass [G93.89]     This is a 50 year old female who is s/p embolization/angiogram with Dr. Gu on 5/24/25. She underwent left occipital craniotomy for resection of meningioma yesterday afternoon with Dr. Mitchell. Intraoperative pathology consistent with meningioma. Postoperative head CT reviewed and is stable. MRI of the brain postoperatively is also stable. There is a small amount of residual tumor situated around the sinus. We will plan for SRS in about 3 months. I would like to ambulate her today and have her work with PT/OT. We can DC hobbs and art line. We should be able to transfer out of the unit today. I will check on her drain output later today and we will most likely pull it as long as she is not putting out a lot. I am also going to add on a scheduled muscle relaxant to help with her postoperative pain before I increase her narcotic as she is having some constipation. I am also going to start her on some Keppra for seizure prophylaxis. Please reach out with any further questions or concerns. I anticipate discharge in the next day or two.     Jaleesa Carlson PA-C     Electronically signed by Benito Mitchell MD at 06/26/25 1027       Shaista Woodson APRN at 06/25/25 1808            INTENSIVIST NOTE     SUBJECTIVE     Ms. Ryan is a 50 year old female with pmhx of insomnia and anxiety who presented to [facility] on 6/18 after being found to have an occipital brain mass with mild mass effect on the posterior falx and posterior horn left  "lateral ventricle and mild edema. Patient was admitted to medicine team and started on steroids. Neurosurgery was consulted and followed throughout admission. On 6/24, patient underwent angiogram with emobolization with PVA particles and microcoils of the large left meningioma.   On 6/25, patient was taken to the OR where she underwent left occipital craniotomy for resection of brain tumor. Post-operatively, patient was transferred to the ICU for higher level of care. Upon arrival to the ICU, patient is in pain (10/10). She denies blurry vision, lightheadedness, dizziness, weakness.      The patient's relevant past medical, surgical and social history were reviewed and updated in Epic as appropriate.       OBJECTIVE     Vital Sign Min/Max for last 24 hours  Temp  Min: 96.8 °F (36 °C)  Max: 98 °F (36.7 °C)   BP  Min: 103/54  Max: 165/95   Pulse  Min: 48  Max: 84   Resp  Min: 14  Max: 18   SpO2  Min: 95 %  Max: 100 %   No data recorded   No data recorded      Intake/Output Summary (Last 24 hours) at 6/25/2025 1805  Last data filed at 6/25/2025 1727  Gross per 24 hour   Intake 2250 ml   Output 3850 ml   Net -1600 ml      Flowsheet Rows      Flowsheet Row First Filed Value   Admission Height 177.8 cm (70\") Documented at 06/18/2025 1926   Admission Weight 95.3 kg (210 lb) Documented at 06/18/2025 1926               06/18/25  1926   Weight: 95.3 kg (210 lb)            Objective:  General Appearance:  Comfortable, well-appearing and in pain.    Vital signs: (most recent): Blood pressure 114/65, pulse 76, temperature 98 °F (36.7 °C), temperature source Temporal, resp. rate 15, height 177.8 cm (70\"), weight 95.3 kg (210 lb), SpO2 96%.  Vital signs are normal.    Lungs:  Normal effort and normal respiratory rate.  Breath sounds clear to auscultation.    Heart: Normal rate.  Regular rhythm.  S1 normal and S2 normal.    Abdomen: Abdomen is soft.  Bowel sounds are normal.     Pulses: Distal pulses are intact.    Neurological: " Patient is alert.  Normal strength.    Skin:  Warm and dry.  (Crani site- dressing c/d/I. RONI drain with bloody output. )              I reviewed the patient's new clinical results.  I reviewed the patient's new imaging results/reports including actual images and agree with reports.    INFUSIONS  [START ON 6/26/2025] lactated ringers, 9 mL/hr, Last Rate: 9 mL/hr (06/25/25 1052)  lactated ringers, 9 mL/hr  niCARdipine, 5-15 mg/hr        Results from last 7 days   Lab Units 06/25/25  0446 06/24/25  0611 06/19/25  1116   WBC 10*3/mm3 9.61 10.11 5.20   HEMOGLOBIN g/dL 15.2 15.4 15.1   HEMATOCRIT % 43.4 43.9 44.0   PLATELETS 10*3/mm3 316 338 267     Results from last 7 days   Lab Units 06/25/25  0446 06/24/25  0611 06/19/25  1116 06/18/25 2023   SODIUM mmol/L 138 136 137 141   POTASSIUM mmol/L 3.9 3.9 3.9 3.6   CHLORIDE mmol/L 99 99 100 102   CO2 mmol/L 29.0 22.9 24.0 27.0   BUN mg/dL 17.0 13.7 10.6 10.3   GLUCOSE mg/dL 126* 110* 126* 94   CREATININE mg/dL 0.58 0.63 0.55* 0.60   CALCIUM mg/dL 9.2 9.6 9.9 9.2   ALBUMIN g/dL  --   --  4.3 4.2       I reviewed the patient's medications.    Assessment & Plan   ASSESSMENT      Active Hospital Problems    Diagnosis     **Brain mass     Anxiety and depression            PLAN     Brain mass, s/p embolization of large left occipital meningioma 6/24 and left occipital crani/resection of brain tumor 6/25: post-op care per neurosurgery. Await post-op imaging ordered per neurosurgery. Continue Decadron. PRN Cardene to maintain SBP < 140 mmHg  Anxiety  Insomnia: resume temazepam as needed if okay with neurosurgery.         I discussed the patient's findings and my recommendations with patient     Plan of care and goals reviewed with multidisciplinary team at daily rounds.    DAVID Greer  Pulmonary and Critical Care Medicine  06/25/25 18:05 EDT         Electronically signed by Shaista Woodson APRN at 06/25/25 9689       Jaleesa Carlson PA-C at 06/25/25 1879        Attestation signed by Benito Mitchell MD at 25 1027      I have reviewed this documentation and agree.                      Chief complaint: S/p embolization, brain mass, clinically significant cerebral edema     Admit Diagnosis:   Brain mass [G93.89]     Subjective: No events overnight. Patient somewhat tearful and anxious but overall is in good spirits. Her  is at the bedside.     Objective:    Vitals:    25 0815   BP: 155/92   Pulse: 50   Resp: 18   Temp: 97.1 °F (36.2 °C)   SpO2: 96%     Pulse  Av.5  Min: 43  Max: 75  Systolic (24hrs), Av , Min:104 , Max:155     Diastolic (24hrs), Av, Min:55, Max:106    Temp (24hrs), Av.2 °F (36.2 °C), Min:96.8 °F (36 °C), Max:97.6 °F (36.4 °C)      Awake, alert, pleasant and conversant. She is anxious and tearful but did calm down some. Able to move all 4 extremities to command. Radial incision from embolization dressing is dry.     Lab Results   Component Value Date     2025       A/P:   Admit Diagnosis:   Brain mass [G93.89]     Patient underwent MRI with fiducials yesterday for surgical planning. She is s/p embolization with  Given day one. Awaiting surgery this afternoon. She is scheduled for noon today for craniotomy. I will see her in preop. All questions answered this morning with the patient and her  who were at the bedside.     Jaleesa Carlson PA-C     Electronically signed by Benito Mitchell MD at 25 1027       Brain Mancia MD at 25 0910              [facility] Medicine Services  PROGRESS NOTE    Patient Name: Evelia Ryan  : 1975  MRN: [id number]    Date of Admission: 2025  Primary Care Physician: Nina Cheng APRN    Subjective   Subjective     CC:  Headache, brain mass    HPI:  No significant overnight events, plan for surgery today, was n.p.o. at night.      Objective   Objective     Vital Signs:   Temp:  [96.8 °F (36  °C)-97.6 °F (36.4 °C)] 97.1 °F (36.2 °C)  Heart Rate:  [42-75] 50  Resp:  [14-18] 18  BP: (104-155)/() 155/92     Physical Exam:  Constitutional: No acute distress, awake, alert,  and sons at bedside  HENT: NCAT, mucous membranes moist  Respiratory: Clear to auscultation bilaterally, respiratory effort normal   Cardiovascular: RRR, no murmurs, rubs, or gallops  Gastrointestinal: Positive bowel sounds, soft, nontender, nondistended  Musculoskeletal: No bilateral ankle edema  Psychiatric: Appropriate affect, cooperative  Neurologic: Oriented x 3, ROBB, speech clear  Skin: No rashes      Results Reviewed:  LAB RESULTS:      Lab 06/25/25 0446 06/24/25 0611 06/19/25 1116 06/18/25 2023   WBC 9.61 10.11 5.20 6.60   HEMOGLOBIN 15.2 15.4 15.1 14.3   HEMATOCRIT 43.4 43.9 44.0 42.3   PLATELETS 316 338 267 252   NEUTROS ABS  --   --  4.66 5.00   IMMATURE GRANS (ABS)  --   --  0.04 0.02   LYMPHS ABS  --   --  0.45* 1.10   MONOS ABS  --   --  0.04* 0.37   EOS ABS  --   --  0.00 0.07   MCV 91.6 91.6 93.4 93.4   PROTIME 13.5 13.0  --   --          Lab 06/25/25 0446 06/24/25 0611 06/19/25 1116 06/18/25 2023   SODIUM 138 136 137 141   POTASSIUM 3.9 3.9 3.9 3.6   CHLORIDE 99 99 100 102   CO2 29.0 22.9 24.0 27.0   ANION GAP 10.0 14.1 13.0 12.0   BUN 17.0 13.7 10.6 10.3   CREATININE 0.58 0.63 0.55* 0.60   EGFR 109.0 106.9 110.4 108.2   GLUCOSE 126* 110* 126* 94   CALCIUM 9.2 9.6 9.9 9.2         Lab 06/19/25 1116 06/18/25 2023   TOTAL PROTEIN 7.9 6.8   ALBUMIN 4.3 4.2   GLOBULIN 3.6 2.6   ALT (SGPT) 15 14   AST (SGOT) 16 17   BILIRUBIN 0.5 0.4   ALK PHOS 77 67         Lab 06/25/25  0446 06/24/25  0611   PROTIME 13.5 13.0   INR 0.97 0.93                 Brief Urine Lab Results       None            Microbiology Results Abnormal       None            Invasive peripheral vascular study  Result Date: 6/25/2025  Clinical Indication: 50-year-old female with large left parietal/occipital mass (probable meningioma).  She  presents for pre-- operative angiography and arterial/venous mapping, along with embolization. : Dr. Roney Gu. Access: Right radial artery.  Ultrasound was used to identify the right radial artery for access. It was patent, appropriate for catheterization, and used for guidance of the micropuncture. Successful cannulation was achieved and images were saved to PACS for review. Conscious sedation: Moderate sedation was provided by me for a total of 45 minutes.  Physical vitals were continuously monitored by an independently trained observer.  A total of 2 mg of Versed and 100 ug of fentanyl were administered intravenously. Estimated blood loss: Negligible Complication: None apparent. Procedures: 1.  Selective bilateral internal carotid artery catheterization with subsequent diagnostic bilateral extracranial and intracranial carotid angiography. 2.  Selective bilateral external carotid artery catheterization with subsequent diagnostic bilateral external carotid angiography. 3.  Selective right vertebral artery catheterization with subsequent diagnostic right vertebral angiography. 4.  Selective microcatheterization of the left middle meningeal artery (third order left carotid), with microcatheter angiography, for intervention 5.  Intracranial embolization, left middle meningeal artery and left parietal/occipital meningioma 6.  Follow-up angiography after embolization 7.  Ultrasound guided arterial access, right radial artery 8.  Conscious sedation Technique/Findings: Formal written consent for the procedure was obtained from the patient/patient's family after explained risks to include bleeding, infection, contrast reaction, vascular injury, and stroke.  The right wrist region was prepped and draped in the usual, sterile fashion.  Local anesthesia with 1% lidocaine infiltration was achieved.  Additionally, intravenous fentanyl and Versed were given for patient comfort throughout the procedure, the details  "of which are documented in the nursing record.  Utilizing Ultrasound guidance and Seldinger technique, a 6 [place] vascular sheath was placed into the right radial artery without difficulty.  A radial \"cocktail\" was then administered, per protocol, the details of which are documented in the nursing record.  Subsequently, a 5 [place] Barragan 2 catheter and eDossea intermediate catheter were advanced into the aortic arch and used to select the bilateral common carotid, bilateral internal carotid, bilateral external carotid, right subclavian, and right vertebral arteries under fluoroscopic guidance.  Appropriate angiographic sequences were filmed following contrast injection.  Selective right vertebral artery catheterization and angiography: Injection of the proximal right subclavian artery demonstrates an unremarkable appearance of the right vertebral artery origin.  The intracranial circulation was opacified via a right vertebral artery injection, demonstrating no aneurysm or vascular malformation.  The calcarine branches of the left posterior cerebral artery are superiorly displaced/draped over the patient's known large left parietal/occipital parasagittal mass.  There is no significant arterial supply to the tumor from the posterior circulation.  No large vessel occlusion.  No changes of vasculitis nor vasospasm.  The dural venous sinuses are patent. Selective right internal carotid artery catheterization and angiography: The cervical portions of the right carotid vasculature demonstrate no significant plaque formation and no appreciable stenosis utilizing NASCET criteria.  The intracranial circulation was opacified via a right internal carotid injection, demonstrating no aneurysm, vascular malformation, significant stenosis, nor large vessel occlusion.  The distal 1/3 of the superior sagittal sinus is displaced rightward by the patient's known left parietal/occipital mass, but is widely patent. Selective " right external carotid artery catheterization and angiography: The right external carotid angiogram demonstrates no dural AV fistula or other vascular abnormality.  There is no appreciable supply to the patient's left parietal/occipital mass from the right external carotid circulation. Selective left internal carotid artery catheterization and carotid angiography: The cervical portions of the left carotid vasculature demonstrate no significant atherosclerotic plaque formation at the carotid bifurcation and no appreciable stenosis utilizing NASCET criteria.  The intracranial circulation was opacified via a left internal carotid injection, demonstrating no aneurysm or vascular malformation.  No significant stenosis or large vessel occlusion.  No changes of vasculitis or vasospasm.  Again noted is displacement of the distal 1/3 of the superior sagittal sinus. Selective left external carotid artery catheterization and angiography: The left external carotid angiogram demonstrates a parasitized/hypertrophied posterior division of the middle meningeal artery, supplying a nearly 5 cm left posterior parietal/occipital hypervascular mass, consistent with a meningioma.  Venous drainage from the mass is predominantly from a large vein draining from the superior aspects of the tumor medially to the adjacent superior sagittal sinus.  Again, the adjacent superior sagittal sinus is displaced, but does not appear to be invaded by the tumor. With the Benchmark intermediate catheter purchased in the left external carotid artery origin, a Renegade microcatheter was navigated over a Synchro microwire into the left middle meningeal artery without difficulty.  Microcatheter angiography confirms appropriate placement within the left middle meningeal artery, with again seen left parietal/occipital hypervascular mass.  No vascular anomalies are appreciated.  The left parietal/occipital mass was subsequently embolized with PVA particles  "ranging in size from 250-350 µm until there was stasis within the middle meningeal artery.  The embolization was completed with placement of 2 Target XL microcoil's.  Follow-up angiography demonstrates stasis and distal occlusion of the left middle meningeal artery with no complicating features. At the end of the procedure, all catheters and sheaths were removed from the wrist and hemostasis was achieved at the puncture site utilizing manual compression and placement of a radial \"TR band\".  The patient tolerated the procedure well without apparent complication.     Impression: Large, nearly 5 cm hypervascular extra-axial mass centered in the left parietal/occipital region, the mass abuts and displaces the distal one third of the superior sagittal sinus, but this does not appear invaded by the tumor.  Arterial supply to the tumor is predominantly via parasitized left middle meningeal arterial branches, and these were pre-- operatively embolized with PVA particles and microcoil's.    MRI Brain With Contrast  Result Date: 6/25/2025  MRI BRAIN W CONTRAST Date of Exam: 6/25/2025 1:00 AM EDT Indication: surgical planning, brain mass.  Comparison: 6/18/2025. Technique:  Routine multiplanar/multisequence sequence images of the brain were obtained after the uneventful administration of 40 mL Multihance.  Findings: Limited sequence operative planning exam including thin cut postcontrast sequences redemonstrates a homogeneously enhancing 5.3 x 4.4 cm mass of the left occipital lobe, abutting and likely adherent to the adjacent inferior aspect of the sagittal sinus and straight sinus which appears somewhat narrowed, without definite steven invasion or occlusion. Surrounding mild mass effect is unchanged. There is no unexpected intracranial hemorrhage, additional mass or new mass effect. The ventricles are unchanged.  The orbits appear normal. The paranasal sinuses are clear.     Impression: Impression: Stable size and appearance " "of previously noted 5.3 x 4.4 cm avidly enhancing, partially necrotic and calcified mass of the left occipital lobe, favoring meningioma. Electronically Signed: Hieu Jimenez MD  6/25/2025 6:12 AM EDT  Workstation ID: [id number]            Current medications:  Scheduled Meds:ceFAZolin, 2 g, Intravenous, Once  dexAMETHasone, 10 mg, Intravenous, Once  dexAMETHasone, 4 mg, Intravenous, Q6H  melatonin, 10 mg, Oral, Nightly  sodium chloride, 10 mL, Intravenous, Q12H      Continuous Infusions:   PRN Meds:.  acetaminophen **OR** acetaminophen **OR** acetaminophen    senna-docusate sodium **AND** polyethylene glycol **AND** bisacodyl **AND** bisacodyl    HYDROcodone-acetaminophen    Morphine    nitroglycerin    ondansetron    [COMPLETED] Insert Peripheral IV **AND** sodium chloride    sodium chloride    temazepam    Assessment & Plan   Assessment & Plan     Active Hospital Problems    Diagnosis  POA    **Brain mass [G93.89]  Yes    Anxiety and depression [F41.9, F32.A]  Yes      Resolved Hospital Problems   No resolved problems to display.        Brief Hospital Course to date:  Evelia Ryan is a 50 y.o. female  that for the last few weeks she has noticed increasing frequency of global headaches, occasional dizziness, increased fatigue, bilateral hand pain, and especially vision changes. She states her vision has become more more blurry especially over the last couple of weeks. Her headache became worse over the last 24 hours, with home/over-the-counter agents not helping. She confirms nausea with the headaches as well but no emesis. She also states that she has sometimes noticed when lying in bed certain ways, she will have obvious increase in headache specifically in the occipital region. She states that she \"knew that was probably not good,\" so she went to see her PCP who ordered outpatient imaging, and she states she was called and told that this revealed a occipital brain mass, and that she was instructed to come to " the ED      Brain Mass, likely Meningioma  MRI confirms; Left occipital convexity extra-axial appearing mass measuring up to 6.2 cm, most likely a partially calcified meningioma. The mass abuts and likely invades the adjacent superior sagittal sinus which otherwise remains patent. Mass effect on the left occipital lobe with narrowing of the left lateral ventricle and without significant herniation or midline shift.  continue Decadron  neurosurgery following.  Recs 72 hours of high dose steroids.  Hold on keppra for now since no seizure like activity.  S/p angiogram/embolization today with Dr. Gu to help map out blood supply to mengioma.  Repeat MRI brain with and without with fiducials today.  Plans for surgery for craniotomy with Dr. Mitchell today  neuro checks  NS increased lortab from 5mg to 7.5mg prn to help with headaches  PT/OT  Recommend switch to PPI if patient to get p.o. steroids at DC     Anxiety  prn ativan    Insomnia  likely d/t steroids.  Increased restoril to 30mg PRN    Expected Discharge Location and Transportation: TBD  Expected Discharge   Expected Discharge Date: 6/28/2025; Expected Discharge Time:      VTE Prophylaxis:  Mechanical VTE prophylaxis orders are present.     Total time spent: Time Spent: Time Spent: 40 minutes  Time spent includes time reviewing chart, face-to-face time, counseling patient/family/caregiver, ordering medications/tests/procedures, communicating with other health care professionals, documenting clinical information in the electronic health record, and coordination of care.      AM-PAC 6 Clicks Score (PT): 24 (06/24/25 2000)    CODE STATUS:   Code Status and Medical Interventions: CPR (Attempt to Resuscitate); Full Support   Ordered at: 06/18/25 2348     Code Status (Patient has no pulse and is not breathing):    CPR (Attempt to Resuscitate)     Medical Interventions (Patient has pulse or is breathing):    Full Support     Level Of Support Discussed With:    Patient        Brain Mancia MD  25        Electronically signed by Brain Mancia MD at 25 1121       Anup Santacruz MD at 25 1620              [facility]  PROGRESS NOTE    Patient Name: Evelia Ryan  : 1975  MRN: [id number]    Date of Admission: 2025  Primary Care Physician: Nina Cheng APRN    Subjective   Subjective     CC:  Headache, brain mass    HPI:  S/p angio this AM with Dr. Gu.  Patient states that went a well.  She is surrounded by family and feels ready for surgery tomorrow      Objective   Objective     Vital Signs:   Temp:  [97.7 °F (36.5 °C)-98.3 °F (36.8 °C)] 97.7 °F (36.5 °C)  Heart Rate:  [42-75] 51  Resp:  [14-17] 14  BP: (104-162)/() 115/71  Flow (L/min) (Oxygen Therapy):  [3] 3     Physical Exam:  Constitutional: No acute distress, awake, alert,  and sons at bedside  HENT: NCAT, mucous membranes moist  Respiratory: Clear to auscultation bilaterally, respiratory effort normal   Cardiovascular: RRR, no murmurs, rubs, or gallops  Gastrointestinal: Positive bowel sounds, soft, nontender, nondistended  Musculoskeletal: No bilateral ankle edema  Psychiatric: Appropriate affect, cooperative  Neurologic: Oriented x 3, ROBB, speech clear  Skin: No rashes      Results Reviewed:  LAB RESULTS:      Lab 25  0611 25  1116 25   WBC 10.11 5.20 6.60   HEMOGLOBIN 15.4 15.1 14.3   HEMATOCRIT 43.9 44.0 42.3   PLATELETS 338 267 252   NEUTROS ABS  --  4.66 5.00   IMMATURE GRANS (ABS)  --  0.04 0.02   LYMPHS ABS  --  0.45* 1.10   MONOS ABS  --  0.04* 0.37   EOS ABS  --  0.00 0.07   MCV 91.6 93.4 93.4   PROTIME 13.0  --   --          Lab 25  0611 25  1116 25   SODIUM 136 137 141   POTASSIUM 3.9 3.9 3.6   CHLORIDE 99 100 102   CO2 22.9 24.0 27.0   ANION GAP 14.1 13.0 12.0   BUN 13.7 10.6 10.3   CREATININE 0.63 0.55* 0.60   EGFR 106.9 110.4 108.2   GLUCOSE 110* 126* 94  "  CALCIUM 9.6 9.9 9.2         Lab 06/19/25  1116 06/18/25 2023   TOTAL PROTEIN 7.9 6.8   ALBUMIN 4.3 4.2   GLOBULIN 3.6 2.6   ALT (SGPT) 15 14   AST (SGOT) 16 17   BILIRUBIN 0.5 0.4   ALK PHOS 77 67         Lab 06/24/25  0611   PROTIME 13.0   INR 0.93                 Brief Urine Lab Results       None            Microbiology Results Abnormal       None            No radiology results from the last 24 hrs          Current medications:  Scheduled Meds:dexAMETHasone, 4 mg, Intravenous, Q6H  melatonin, 10 mg, Oral, Nightly  sodium chloride, 10 mL, Intravenous, Q12H      Continuous Infusions:   PRN Meds:.  acetaminophen **OR** acetaminophen **OR** acetaminophen    senna-docusate sodium **AND** polyethylene glycol **AND** bisacodyl **AND** bisacodyl    HYDROcodone-acetaminophen    Morphine    nitroglycerin    ondansetron    [COMPLETED] Insert Peripheral IV **AND** sodium chloride    sodium chloride    temazepam    Assessment & Plan   Assessment & Plan     Active Hospital Problems    Diagnosis  POA    **Brain mass [G93.89]  Yes    Anxiety and depression [F41.9, F32.A]  Yes      Resolved Hospital Problems   No resolved problems to display.        Brief Hospital Course to date:  Evelia Ryan is a 50 y.o. female  that for the last few weeks she has noticed increasing frequency of global headaches, occasional dizziness, increased fatigue, bilateral hand pain, and especially vision changes. She states her vision has become more more blurry especially over the last couple of weeks. Her headache became worse over the last 24 hours, with home/over-the-counter agents not helping. She confirms nausea with the headaches as well but no emesis. She also states that she has sometimes noticed when lying in bed certain ways, she will have obvious increase in headache specifically in the occipital region. She states that she \"knew that was probably not good,\" so she went to see her PCP who ordered outpatient imaging, and she states " she was called and told that this revealed a occipital brain mass, and that she was instructed to come to the ED      Brain Mass, likely Meningioma  - MRI confirms; Left occipital convexity extra-axial appearing mass measuring up to 6.2 cm, most likely a partially calcified meningioma. The mass abuts and likely invades the adjacent superior sagittal sinus which otherwise remains patent. Mass effect on the left occipital lobe with narrowing of the left lateral ventricle and without significant herniation or midline shift.  - continue Decadron  - neurosurgery following.  Recs 72 hours of high dose steroids.  Hold on keppra for now since no seizure like activity.  S/p angiogram/embolization today with Dr. Gu to help map out blood supply to mengioma.  Repeat MRI brain with and without with fiducials today.  Plans for surgery with Dr. Mitchell tomorrow  - neuro checks  -NS increased lortab from 5mg to 7.5mg prn to help with headaches  - PT/OT     Anxiety  - prn ativan    Insomnia  --likely d/t steroids.  Increased restoril to 30mg PRN        Expected Discharge Location and Transportation: D  Expected Discharge   Expected Discharge Date: 6/28/2025; Expected Discharge Time:      VTE Prophylaxis:  Mechanical VTE prophylaxis orders are present.         AM-PAC 6 Clicks Score (PT): 24 (06/24/25 3418)    CODE STATUS:   Code Status and Medical Interventions: CPR (Attempt to Resuscitate); Full Support   Ordered at: 06/18/25 0760     Code Status (Patient has no pulse and is not breathing):    CPR (Attempt to Resuscitate)     Medical Interventions (Patient has pulse or is breathing):    Full Support     Level Of Support Discussed With:    Patient       Anup Santacruz MD  06/24/25        Electronically signed by Anup Santacruz MD at 06/24/25 5960

## 2025-06-28 VITALS
SYSTOLIC BLOOD PRESSURE: 122 MMHG | DIASTOLIC BLOOD PRESSURE: 73 MMHG | HEART RATE: 47 BPM | WEIGHT: 204.59 LBS | RESPIRATION RATE: 18 BRPM | OXYGEN SATURATION: 95 % | HEIGHT: 70 IN | BODY MASS INDEX: 29.29 KG/M2 | TEMPERATURE: 98.2 F

## 2025-06-28 PROBLEM — D32.9 MENINGIOMA: Status: ACTIVE | Noted: 2025-06-28

## 2025-06-28 PROCEDURE — 99239 HOSP IP/OBS DSCHRG MGMT >30: CPT | Performed by: INTERNAL MEDICINE

## 2025-06-28 PROCEDURE — 63710000001 DEXAMETHASONE PER 0.25 MG: Performed by: INTERNAL MEDICINE

## 2025-06-28 PROCEDURE — 25010000002 DEXAMETHASONE PER 1 MG: Performed by: NEUROLOGICAL SURGERY

## 2025-06-28 RX ORDER — ACETAMINOPHEN 500 MG
500 TABLET ORAL EVERY 6 HOURS PRN
Start: 2025-06-28

## 2025-06-28 RX ORDER — POLYETHYLENE GLYCOL 3350 17 G/17G
17 POWDER, FOR SOLUTION ORAL DAILY
Qty: 510 G | Refills: 0 | Status: SHIPPED | OUTPATIENT
Start: 2025-06-28

## 2025-06-28 RX ORDER — POLYETHYLENE GLYCOL 3350 17 G/17G
17 POWDER, FOR SOLUTION ORAL DAILY
Status: DISCONTINUED | OUTPATIENT
Start: 2025-06-28 | End: 2025-06-28 | Stop reason: HOSPADM

## 2025-06-28 RX ORDER — LORAZEPAM 1 MG/1
TABLET ORAL
Qty: 10 TABLET | Refills: 0 | Status: SHIPPED | OUTPATIENT
Start: 2025-06-28 | End: 2025-07-02

## 2025-06-28 RX ORDER — DEXAMETHASONE 4 MG/1
4 TABLET ORAL EVERY 6 HOURS SCHEDULED
Status: DISCONTINUED | OUTPATIENT
Start: 2025-06-28 | End: 2025-06-28 | Stop reason: HOSPADM

## 2025-06-28 RX ORDER — HYDROCODONE BITARTRATE AND ACETAMINOPHEN 7.5; 325 MG/1; MG/1
1 TABLET ORAL EVERY 4 HOURS PRN
Qty: 24 TABLET | Refills: 0 | Status: SHIPPED | OUTPATIENT
Start: 2025-06-28 | End: 2025-07-02

## 2025-06-28 RX ORDER — PANTOPRAZOLE SODIUM 40 MG/1
40 TABLET, DELAYED RELEASE ORAL
Status: DISCONTINUED | OUTPATIENT
Start: 2025-06-28 | End: 2025-06-28 | Stop reason: HOSPADM

## 2025-06-28 RX ORDER — DEXAMETHASONE 4 MG/1
TABLET ORAL
Qty: 20 TABLET | Refills: 0 | Status: SHIPPED | OUTPATIENT
Start: 2025-06-28 | End: 2025-07-10

## 2025-06-28 RX ORDER — LEVETIRACETAM 500 MG/1
500 TABLET ORAL 2 TIMES DAILY
Qty: 60 TABLET | Refills: 0 | Status: SHIPPED | OUTPATIENT
Start: 2025-06-28 | End: 2025-07-28

## 2025-06-28 RX ORDER — PANTOPRAZOLE SODIUM 40 MG/1
40 TABLET, DELAYED RELEASE ORAL
Qty: 30 TABLET | Refills: 0 | Status: SHIPPED | OUTPATIENT
Start: 2025-06-28

## 2025-06-28 RX ORDER — AMOXICILLIN 250 MG
1 CAPSULE ORAL 2 TIMES DAILY
Status: DISCONTINUED | OUTPATIENT
Start: 2025-06-28 | End: 2025-06-28 | Stop reason: HOSPADM

## 2025-06-28 RX ADMIN — HYDROCODONE BITARTRATE AND ACETAMINOPHEN 1 TABLET: 7.5; 325 TABLET ORAL at 09:44

## 2025-06-28 RX ADMIN — DOCUSATE SODIUM 50 MG AND SENNOSIDES 8.6 MG 1 TABLET: 8.6; 5 TABLET, FILM COATED ORAL at 09:43

## 2025-06-28 RX ADMIN — HYDROCODONE BITARTRATE AND ACETAMINOPHEN 1 TABLET: 7.5; 325 TABLET ORAL at 05:18

## 2025-06-28 RX ADMIN — MUPIROCIN 1 APPLICATION: 20 OINTMENT TOPICAL at 09:43

## 2025-06-28 RX ADMIN — DEXAMETHASONE 4 MG: 4 TABLET ORAL at 11:17

## 2025-06-28 RX ADMIN — DEXAMETHASONE SODIUM PHOSPHATE 4 MG: 4 INJECTION, SOLUTION INTRA-ARTICULAR; INTRALESIONAL; INTRAMUSCULAR; INTRAVENOUS; SOFT TISSUE at 05:19

## 2025-06-28 RX ADMIN — DIAZEPAM 5 MG: 5 TABLET ORAL at 09:43

## 2025-06-28 RX ADMIN — POLYETHYLENE GLYCOL 3350 17 G: 17 POWDER, FOR SOLUTION ORAL at 09:43

## 2025-06-28 RX ADMIN — IBUPROFEN 600 MG: 600 TABLET, FILM COATED ORAL at 03:25

## 2025-06-28 RX ADMIN — HYDROCODONE BITARTRATE AND ACETAMINOPHEN 1 TABLET: 7.5; 325 TABLET ORAL at 01:47

## 2025-06-28 RX ADMIN — PANTOPRAZOLE SODIUM 40 MG: 40 TABLET, DELAYED RELEASE ORAL at 09:43

## 2025-06-28 RX ADMIN — LEVETIRACETAM 500 MG: 500 TABLET, FILM COATED ORAL at 09:43

## 2025-06-28 NOTE — PROGRESS NOTES
HOD# : 9    No events last night  Patient doing well with neck soreness and mild headache.    Patient seems to be doing a lot better emotionally but was somewhat tearful when discussing pathology.    Patient is able to move all extremities with good command GCS 15.    XR movements intact visual fields are full.  Wound care instructions discussed with the patient's  and the patient.  No answer questions at the end of the visit    Brain mass    Anxiety and depression    Meningioma      Temp:  [98.1 °F (36.7 °C)-98.7 °F (37.1 °C)] 98.2 °F (36.8 °C)  Heart Rate:  [47-73] 47  Resp:  [16-18] 18  BP: (108-130)/(63-96) 122/73  I/O last 3 completed shifts:  In: 225 [P.O.:225]  Out: 50 [Drains:50]  No intake/output data recorded.  Vital signs were reviewed and documented in the chart      EXAM   Body mass index is 29.36 kg/m².      Patient appeared in good neurologic function with normal comprehension   CN grossly intact  Moves all extremities to command      DIAGNOSIS  Left occipital meningioma  S/p craniotomy      PLAN    We will see her back in a couple of weeks.    I put her on a steroid taper.    The remainder the medicines will stay the same.    Wound care instructions and activities and limitations discussed with the patient and  in the visit

## 2025-06-28 NOTE — PLAN OF CARE
Problem: Adult Inpatient Plan of Care  Goal: Plan of Care Review  Outcome: Progressing  Flowsheets (Taken 6/28/2025 0431)  Progress: improving  Plan of Care Reviewed With: patient  Goal: Patient-Specific Goal (Individualized)  Outcome: Progressing  Goal: Absence of Hospital-Acquired Illness or Injury  Outcome: Progressing  Intervention: Identify and Manage Fall Risk  Recent Flowsheet Documentation  Taken 6/28/2025 0400 by Jason Collier RN  Safety Promotion/Fall Prevention:   activity supervised   assistive device/personal items within reach   clutter free environment maintained   fall prevention program maintained   nonskid shoes/slippers when out of bed   room organization consistent   safety round/check completed   toileting scheduled  Taken 6/28/2025 0213 by Jason Collier RN  Safety Promotion/Fall Prevention:   activity supervised   assistive device/personal items within reach   clutter free environment maintained   fall prevention program maintained   nonskid shoes/slippers when out of bed   room organization consistent   safety round/check completed   toileting scheduled  Taken 6/28/2025 0000 by Jason Collier RN  Safety Promotion/Fall Prevention:   activity supervised   assistive device/personal items within reach   clutter free environment maintained   fall prevention program maintained   nonskid shoes/slippers when out of bed   room organization consistent   safety round/check completed   toileting scheduled  Taken 6/27/2025 2200 by Jason Collier RN  Safety Promotion/Fall Prevention:   activity supervised   assistive device/personal items within reach   clutter free environment maintained   fall prevention program maintained   nonskid shoes/slippers when out of bed   room organization consistent   safety round/check completed   toileting scheduled  Taken 6/27/2025 2000 by Jason Collier RN  Safety Promotion/Fall Prevention:   activity supervised   assistive device/personal items within  reach   clutter free environment maintained   fall prevention program maintained   nonskid shoes/slippers when out of bed   room organization consistent   safety round/check completed   toileting scheduled  Intervention: Prevent Skin Injury  Recent Flowsheet Documentation  Taken 6/28/2025 0400 by Jason Collier RN  Body Position: position changed independently  Taken 6/28/2025 0213 by Jason Collier RN  Body Position: position changed independently  Taken 6/28/2025 0000 by Jason Collier RN  Body Position: position changed independently  Taken 6/27/2025 2200 by Jason Collier RN  Body Position: position changed independently  Taken 6/27/2025 2000 by Jason Collier RN  Body Position: position changed independently  Intervention: Prevent and Manage VTE (Venous Thromboembolism) Risk  Recent Flowsheet Documentation  Taken 6/27/2025 2000 by Jason Collier RN  VTE Prevention/Management:   bilateral   SCDs (sequential compression devices) off  Intervention: Prevent Infection  Recent Flowsheet Documentation  Taken 6/28/2025 0400 by Jason Collier RN  Infection Prevention:   environmental surveillance performed   rest/sleep promoted   single patient room provided  Taken 6/28/2025 0213 by Jason Collier RN  Infection Prevention:   environmental surveillance performed   rest/sleep promoted   single patient room provided  Taken 6/28/2025 0000 by Jason Collier RN  Infection Prevention:   environmental surveillance performed   rest/sleep promoted   single patient room provided  Taken 6/27/2025 2200 by Jason Collier RN  Infection Prevention:   environmental surveillance performed   rest/sleep promoted   single patient room provided  Taken 6/27/2025 2000 by Jason Collier RN  Infection Prevention:   environmental surveillance performed   rest/sleep promoted   single patient room provided  Goal: Optimal Comfort and Wellbeing  Outcome: Progressing  Intervention: Monitor Pain and Promote  Comfort  Recent Flowsheet Documentation  Taken 6/28/2025 0400 by Jason Collier RN  Pain Management Interventions:   no interventions per patient request   quiet environment facilitated   relaxation techniques promoted  Taken 6/28/2025 0213 by Jason Collier RN  Pain Management Interventions:   no interventions per patient request   quiet environment facilitated   relaxation techniques promoted  Taken 6/28/2025 0000 by Jason Collier RN  Pain Management Interventions:   no interventions per patient request   quiet environment facilitated   relaxation techniques promoted  Taken 6/27/2025 2200 by Jason Collier RN  Pain Management Interventions:   no interventions per patient request   quiet environment facilitated   relaxation techniques promoted  Taken 6/27/2025 2132 by Jason Collier RN  Pain Management Interventions:   pain medication given   quiet environment facilitated   relaxation techniques promoted  Taken 6/27/2025 2000 by Jason Collier RN  Pain Management Interventions:   pain medication given   quiet environment facilitated   relaxation techniques promoted  Intervention: Provide Person-Centered Care  Recent Flowsheet Documentation  Taken 6/27/2025 2000 by Jason Collier RN  Trust Relationship/Rapport:   care explained   choices provided   questions answered   thoughts/feelings acknowledged   reassurance provided  Goal: Readiness for Transition of Care  Outcome: Progressing     Problem: Pain Acute  Goal: Optimal Pain Control and Function  Outcome: Progressing  Intervention: Optimize Psychosocial Wellbeing  Recent Flowsheet Documentation  Taken 6/27/2025 2000 by Jason Collier RN  Supportive Measures: active listening utilized  Diversional Activities:   television   smartphone  Intervention: Develop Pain Management Plan  Recent Flowsheet Documentation  Taken 6/28/2025 0400 by Jason Collier RN  Pain Management Interventions:   no interventions per patient request   quiet  environment facilitated   relaxation techniques promoted  Taken 6/28/2025 0213 by Jason Collier RN  Pain Management Interventions:   no interventions per patient request   quiet environment facilitated   relaxation techniques promoted  Taken 6/28/2025 0000 by Jason Collier RN  Pain Management Interventions:   no interventions per patient request   quiet environment facilitated   relaxation techniques promoted  Taken 6/27/2025 2200 by Jason Collier RN  Pain Management Interventions:   no interventions per patient request   quiet environment facilitated   relaxation techniques promoted  Taken 6/27/2025 2132 by Jason Collier RN  Pain Management Interventions:   pain medication given   quiet environment facilitated   relaxation techniques promoted  Taken 6/27/2025 2000 by Jason Collier RN  Pain Management Interventions:   pain medication given   quiet environment facilitated   relaxation techniques promoted  Intervention: Prevent or Manage Pain  Recent Flowsheet Documentation  Taken 6/28/2025 0400 by Jason Collier RN  Medication Review/Management: medications reviewed  Taken 6/28/2025 0213 by Jason Collier RN  Medication Review/Management: medications reviewed  Taken 6/28/2025 0000 by Jason Collier RN  Medication Review/Management: medications reviewed  Taken 6/27/2025 2200 by Jason Collier RN  Medication Review/Management: medications reviewed  Taken 6/27/2025 2000 by Jason Collier RN  Sensory Stimulation Regulation:   care clustered   quiet environment promoted  Bowel Elimination Promotion:   adequate fluid intake promoted   ambulation promoted  Medication Review/Management: medications reviewed     Problem: Fall Injury Risk  Goal: Absence of Fall and Fall-Related Injury  Outcome: Progressing  Intervention: Identify and Manage Contributors  Recent Flowsheet Documentation  Taken 6/28/2025 0400 by Jason Collier RN  Medication Review/Management: medications reviewed  Taken  6/28/2025 0213 by Jason Collier RN  Medication Review/Management: medications reviewed  Taken 6/28/2025 0000 by Jason Collier RN  Medication Review/Management: medications reviewed  Taken 6/27/2025 2200 by Jason Collier RN  Medication Review/Management: medications reviewed  Taken 6/27/2025 2000 by Jason Collier RN  Medication Review/Management: medications reviewed  Intervention: Promote Injury-Free Environment  Recent Flowsheet Documentation  Taken 6/28/2025 0400 by Jason Collier RN  Safety Promotion/Fall Prevention:   activity supervised   assistive device/personal items within reach   clutter free environment maintained   fall prevention program maintained   nonskid shoes/slippers when out of bed   room organization consistent   safety round/check completed   toileting scheduled  Taken 6/28/2025 0213 by Jason Collier RN  Safety Promotion/Fall Prevention:   activity supervised   assistive device/personal items within reach   clutter free environment maintained   fall prevention program maintained   nonskid shoes/slippers when out of bed   room organization consistent   safety round/check completed   toileting scheduled  Taken 6/28/2025 0000 by Jason Collier RN  Safety Promotion/Fall Prevention:   activity supervised   assistive device/personal items within reach   clutter free environment maintained   fall prevention program maintained   nonskid shoes/slippers when out of bed   room organization consistent   safety round/check completed   toileting scheduled  Taken 6/27/2025 2200 by Jason Collier RN  Safety Promotion/Fall Prevention:   activity supervised   assistive device/personal items within reach   clutter free environment maintained   fall prevention program maintained   nonskid shoes/slippers when out of bed   room organization consistent   safety round/check completed   toileting scheduled  Taken 6/27/2025 2000 by Jason Collier RN  Safety Promotion/Fall Prevention:    activity supervised   assistive device/personal items within reach   clutter free environment maintained   fall prevention program maintained   nonskid shoes/slippers when out of bed   room organization consistent   safety round/check completed   toileting scheduled   Goal Outcome Evaluation:  Plan of Care Reviewed With: patient        Progress: improving

## 2025-06-28 NOTE — DISCHARGE INSTRUCTIONS
Patient is okay to have the dressing off when she gets home.  Patient and  are aware that she is able to wash her hair.  Patient should not be touching the incision given specific instructions on how to have avoid her doing that.    Her activities and limitations should be tolerated for the pain in the back of her head and for lifting bending and twisting less than 5 pounds.    Patient should not be bending over at the waist too much.    If she notices drainage out of her incision she should keep her head elevated and not flat.

## 2025-06-28 NOTE — DISCHARGE SUMMARY
[facility] Medicine Services  DISCHARGE SUMMARY    Patient Name: Evelia Ryan  : 1975  MRN: [id number]    Date of Admission: 2025  7:29 PM  Date of Discharge:    Primary Care Physician: Nina Cheng APRN    Consults       Date and Time Order Name Status Description    2025  2:01 AM Inpatient Neurosurgery Consult Completed             Hospital Course     Presenting Problem: brain mass, new diagnosis     Active Hospital Problems    Diagnosis  POA    **Brain mass [G93.89]  Yes    Meningioma [D32.9]  Yes    Anxiety and depression [F41.9, F32.A]  Yes      Resolved Hospital Problems   No resolved problems to display.          Hospital Course:  Evelia Ryan is a 50 y.o. female w no significant pmhx who presented w severe headache, OP CT identified left occipital brain mass and referred to our ED.  MRI revealed large left parietal occipital meningioma w associated edema. Was evaluated by neurosurgery, placed on high dose steroids initially to prepare for neurosurgical intervention.  Underwent pre-op embolization by angiogram w Dr Gu on .  Then left occipital craniotomy w brain tumor resection w Dr Mitchell on  which was well tolerated  Intraoperative pathology consistent with meningioma w reassuring post-op imaging, but small residual tumor that will need follow-up with neurosurgery    Moved to telemetry  which is day of discharge.  -Dexamethasone taper per neurosurgery  -Keppra x 30 days for seizure prophylaxis post-op and then can stop  -Short term ativan + norco prescribed by me. I d/w neurosurgery and they will have family call Monday for a refill and will coordinate medications from there  -Miralax and PPI for side effects from above medications  Neurosurgery follow-up in 3 weeks    Discharge Follow Up Recommendations for outpatient labs/diagnostics:   F/u Dr Mitchell clinic 3 weeks - no driving at least until this follow-up   PCP 1 week  (family to call for new PCP Monday, provided number and instructions). PCP listed in chart is [facility] NP    Day of Discharge     HPI:   Anxious but understanding of all instructions. Understandably teary  Blurred vision persists, worse w looking up    Vital Signs:   Temp:  [98.1 °F (36.7 °C)-98.7 °F (37.1 °C)] 98.2 °F (36.8 °C)  Heart Rate:  [47-73] 47  Resp:  [16-18] 18  BP: (108-130)/(63-96) 122/73      Physical Exam:  Constitutional: No acute distress, awake, alert  Respiratory: Clear to auscultation bilaterally, respiratory effort normal   Cardiovascular: RRR, no murmurs, rubs, or gallops  Gastrointestinal: Soft, nontender, nondistended  Musculoskeletal: Muscle tone within normal limits, no joint effusions appreciated  Psychiatric: Appropriate affect, cooperative  Neurologic: Alert and oriented, facial movements symmetric and spontaneous movement of all 4 extremities grossly equal bilaterally, speech clear. Some pain w vertical eye movement but fair peripheral vision  Skin: No rashes    Pertinent  and/or Most Recent Results     LAB RESULTS:      Lab 06/26/25 0415 06/25/25 0446 06/24/25  0611   WBC 15.69* 9.61 10.11   HEMOGLOBIN 13.3 15.2 15.4   HEMATOCRIT 38.6 43.4 43.9   PLATELETS 296 316 338   NEUTROS ABS 14.18*  --   --    IMMATURE GRANS (ABS) 0.17*  --   --    LYMPHS ABS 0.39*  --   --    MONOS ABS 0.93*  --   --    EOS ABS 0.01  --   --    MCV 92.3 91.6 91.6   PROTIME  --  13.5 13.0         Lab 06/26/25 0415 06/25/25  0446 06/24/25  0611   SODIUM 137 138 136   POTASSIUM 3.8 3.9 3.9   CHLORIDE 97* 99 99   CO2 27.9 29.0 22.9   ANION GAP 12.1 10.0 14.1   BUN 13.7 17.0 13.7   CREATININE 0.59 0.58 0.63   EGFR 108.6 109.0 106.9   GLUCOSE 151* 126* 110*   CALCIUM 8.8 9.2 9.6   MAGNESIUM 2.1  --   --    PHOSPHORUS 3.4  --   --              Lab 06/25/25  0446 06/24/25  0611   PROTIME 13.5 13.0   INR 0.97 0.93             Lab 06/25/25  1145 06/25/25  1053   ABO TYPING A A   RH TYPING Negative Negative   ANTIBODY  SCREEN  --  Negative         Brief Urine Lab Results       None          Microbiology Results (last 10 days)       ** No results found for the last 240 hours. **            MRI Brain With & Without Contrast  Result Date: 6/26/2025  MRI BRAIN W WO CONTRAST Date of Exam: 6/26/2025 3:00 AM EDT Indication: Post-Op Craniotomy.  Comparison: 1 day prior. Technique:  Routine multiplanar/multisequence sequence images of the brain were obtained before and after the uneventful administration of 20 mL Multihance. Findings: No unexpected acute infarct is present on diffusion weighted sequences. Midline structures appear normal and the craniocervical junction is satisfactory. Postoperative changes are noted from interval left occipital craniotomy and resection of the previously noted avidly enhancing parasagittal mass. Some expected blood products, fluid and minimal air noted within the resection cavity, with otherwise improved mass effect and no evidence of unexpected hemorrhage or increased mass effect. There is a persistent small area of presumed enhancing tumor noted abutting and likely adherent to the sagittal sinus measuring 18 x 12 mm in greatest transverse dimension. No new mass or abnormal enhancement is identified elsewhere. The ventricles are normal in size and configuration. The orbits are normal. The paranasal sinuses are clear.     Impression: Postoperative changes noted from interval craniotomy and resection of presumed left occipital meningioma. There is a small amount of likely expected residual meningioma, presumed adherent to the adjacent sagittal sinus which does remain patent. There is no evidence of unexpected hemorrhage, remote infarct or new mass effect. Electronically Signed: Hieu Jimenez MD  6/26/2025 6:44 AM EDT  Workstation ID: [id number]    CT Head Without Contrast  Result Date: 6/25/2025  CT HEAD WO CONTRAST Date of Exam: 6/25/2025 6:15 PM EDT Indication: Postcraniotomy Post Op Craniotomoy  Evaluation. Comparison: Brain MRI 6/25/2025 Technique: Axial CT images were obtained of the head without contrast administration.  Automated exposure control and iterative construction methods were used. Findings: Status post left occipital craniotomy and mass resection. Postoperative cavity measures 3.6 x 3.2 x 4.6 cm containing combination of gas, high density blood and low-density fluid. Thin mixed density subdural collection measuring up to 4 mm thickness containing intermixed gas along the left greater than right occipital convexities. Expected postsurgical pneumocephalus. Similar mass effect as compared to preoperative imaging including minimal 2 mm left to right midline shift. No evidence of transtentorial herniation. Negative for large territory infarct or other significant parenchymal hemorrhage. No hydrocephalus. No obstructive sinus disease. No large mastoid effusion. Few benign scalp calcifications. Surgical drain noted posteriorly. Multiple staples. Scalp soft tissue gas without drainable fluid collection or hematoma. Prior left middle meningeal artery embolization.     Impression: 1. Status post occipital craniotomy and mass resection. Surgical cavity measuring 3.6 x 3.2 x 4.6 cm contains combination of postoperative gas, acute blood and low-density fluid. 2. Thin mixed density occipital convexity subdural fluid collection. 3. Similar associated mass effect and minimal left to right midline shift as compared to preoperative imaging. Electronically Signed: Inocencio Jimenez MD  6/25/2025 7:54 PM EDT  Workstation ID: [id number]    Arterial Line  Result Date: 6/25/2025  Montana Recinos MD     6/25/2025 10:54 AM Arterial Line Patient reassessed immediately prior to procedure Patient location during procedure: pre-op Line placed for hemodynamic monitoring. Performed By Anesthesiologist: Montana Recinos MD Preanesthetic Checklist Completed: patient identified, IV checked, site marked, risks and benefits  discussed, surgical consent, monitors and equipment checked, pre-op evaluation and timeout performed Arterial Line Prep  Sterile Tech: cap, gloves and sterile barriers Prep: ChloraPrep Patient monitoring: blood pressure monitoring, continuous pulse oximetry and EKG Arterial Line Procedure Laterality:right Location:  radial artery Catheter size: 20 G Guidance: ultrasound guided Number of attempts: 1 Successful placement: yes Post Assessment Dressing Type: line sutured, occlusive dressing applied, secured with tape and wrist guard applied. Complications no Circ/Move/Sens Assessment: normal and unchanged. Patient Tolerance: patient tolerated the procedure well with no apparent complications     Invasive peripheral vascular study  Result Date: 6/25/2025  Clinical Indication: 50-year-old female with large left parietal/occipital mass (probable meningioma).  She presents for pre-- operative angiography and arterial/venous mapping, along with embolization. : Dr. Roney Gu. Access: Right radial artery.  Ultrasound was used to identify the right radial artery for access. It was patent, appropriate for catheterization, and used for guidance of the micropuncture. Successful cannulation was achieved and images were saved to PACS for review. Conscious sedation: Moderate sedation was provided by me for a total of 45 minutes.  Physical vitals were continuously monitored by an independently trained observer.  A total of 2 mg of Versed and 100 ug of fentanyl were administered intravenously. Estimated blood loss: Negligible Complication: None apparent. Procedures: 1.  Selective bilateral internal carotid artery catheterization with subsequent diagnostic bilateral extracranial and intracranial carotid angiography. 2.  Selective bilateral external carotid artery catheterization with subsequent diagnostic bilateral external carotid angiography. 3.  Selective right vertebral artery catheterization with subsequent diagnostic  "right vertebral angiography. 4.  Selective microcatheterization of the left middle meningeal artery (third order left carotid), with microcatheter angiography, for intervention 5.  Intracranial embolization, left middle meningeal artery and left parietal/occipital meningioma 6.  Follow-up angiography after embolization 7.  Ultrasound guided arterial access, right radial artery 8.  Conscious sedation Technique/Findings: Formal written consent for the procedure was obtained from the patient/patient's family after explained risks to include bleeding, infection, contrast reaction, vascular injury, and stroke.  The right wrist region was prepped and draped in the usual, sterile fashion.  Local anesthesia with 1% lidocaine infiltration was achieved.  Additionally, intravenous fentanyl and Versed were given for patient comfort throughout the procedure, the details of which are documented in the nursing record.  Utilizing Ultrasound guidance and Seldinger technique, a 6 Stateless vascular sheath was placed into the right radial artery without difficulty.  A radial \"cocktail\" was then administered, per protocol, the details of which are documented in the nursing record.  Subsequently, a 5 Stateless Barragan 2 catheter and Tastebuds intermediate catheter were advanced into the aortic arch and used to select the bilateral common carotid, bilateral internal carotid, bilateral external carotid, right subclavian, and right vertebral arteries under fluoroscopic guidance.  Appropriate angiographic sequences were filmed following contrast injection.  Selective right vertebral artery catheterization and angiography: Injection of the proximal right subclavian artery demonstrates an unremarkable appearance of the right vertebral artery origin.  The intracranial circulation was opacified via a right vertebral artery injection, demonstrating no aneurysm or vascular malformation.  The calcarine branches of the left posterior cerebral " artery are superiorly displaced/draped over the patient's known large left parietal/occipital parasagittal mass.  There is no significant arterial supply to the tumor from the posterior circulation.  No large vessel occlusion.  No changes of vasculitis nor vasospasm.  The dural venous sinuses are patent. Selective right internal carotid artery catheterization and angiography: The cervical portions of the right carotid vasculature demonstrate no significant plaque formation and no appreciable stenosis utilizing NASCET criteria.  The intracranial circulation was opacified via a right internal carotid injection, demonstrating no aneurysm, vascular malformation, significant stenosis, nor large vessel occlusion.  The distal 1/3 of the superior sagittal sinus is displaced rightward by the patient's known left parietal/occipital mass, but is widely patent. Selective right external carotid artery catheterization and angiography: The right external carotid angiogram demonstrates no dural AV fistula or other vascular abnormality.  There is no appreciable supply to the patient's left parietal/occipital mass from the right external carotid circulation. Selective left internal carotid artery catheterization and carotid angiography: The cervical portions of the left carotid vasculature demonstrate no significant atherosclerotic plaque formation at the carotid bifurcation and no appreciable stenosis utilizing NASCET criteria.  The intracranial circulation was opacified via a left internal carotid injection, demonstrating no aneurysm or vascular malformation.  No significant stenosis or large vessel occlusion.  No changes of vasculitis or vasospasm.  Again noted is displacement of the distal 1/3 of the superior sagittal sinus. Selective left external carotid artery catheterization and angiography: The left external carotid angiogram demonstrates a parasitized/hypertrophied posterior division of the middle meningeal artery,  "supplying a nearly 5 cm left posterior parietal/occipital hypervascular mass, consistent with a meningioma.  Venous drainage from the mass is predominantly from a large vein draining from the superior aspects of the tumor medially to the adjacent superior sagittal sinus.  Again, the adjacent superior sagittal sinus is displaced, but does not appear to be invaded by the tumor. With the Benchmark intermediate catheter purchased in the left external carotid artery origin, a Renegade microcatheter was navigated over a Synchro microwire into the left middle meningeal artery without difficulty.  Microcatheter angiography confirms appropriate placement within the left middle meningeal artery, with again seen left parietal/occipital hypervascular mass.  No vascular anomalies are appreciated.  The left parietal/occipital mass was subsequently embolized with PVA particles ranging in size from 250-350 µm until there was stasis within the middle meningeal artery.  The embolization was completed with placement of 2 Target XL microcoil's.  Follow-up angiography demonstrates stasis and distal occlusion of the left middle meningeal artery with no complicating features. At the end of the procedure, all catheters and sheaths were removed from the wrist and hemostasis was achieved at the puncture site utilizing manual compression and placement of a radial \"TR band\".  The patient tolerated the procedure well without apparent complication.     Large, nearly 5 cm hypervascular extra-axial mass centered in the left parietal/occipital region, the mass abuts and displaces the distal one third of the superior sagittal sinus, but this does not appear invaded by the tumor.  Arterial supply to the tumor is predominantly via parasitized left middle meningeal arterial branches, and these were pre-- operatively embolized with PVA particles and microcoil's.    MRI Brain With Contrast  Result Date: 6/25/2025  MRI BRAIN W CONTRAST Date of Exam: " 6/25/2025 1:00 AM EDT Indication: surgical planning, brain mass.  Comparison: 6/18/2025. Technique:  Routine multiplanar/multisequence sequence images of the brain were obtained after the uneventful administration of 40 mL Multihance.  Findings: Limited sequence operative planning exam including thin cut postcontrast sequences redemonstrates a homogeneously enhancing 5.3 x 4.4 cm mass of the left occipital lobe, abutting and likely adherent to the adjacent inferior aspect of the sagittal sinus and straight sinus which appears somewhat narrowed, without definite steven invasion or occlusion. Surrounding mild mass effect is unchanged. There is no unexpected intracranial hemorrhage, additional mass or new mass effect. The ventricles are unchanged.  The orbits appear normal. The paranasal sinuses are clear.     Impression: Stable size and appearance of previously noted 5.3 x 4.4 cm avidly enhancing, partially necrotic and calcified mass of the left occipital lobe, favoring meningioma. Electronically Signed: Hieu Jimenez MD  6/25/2025 6:12 AM EDT  Workstation ID: [id number]    MRI Brain With & Without Contrast  Result Date: 6/18/2025  MRI BRAIN W WO CONTRAST Date of Exam: 6/18/2025 9:35 PM EDT Indication: Per mass found on CT outpatient today.  Comparison: Head CT 6/18/2025. Technique:  Routine multiplanar/multisequence sequence images of the brain were obtained before and after the uneventful administration of 19 mL Multihance. Findings: Left occipital convexity mass which appears mildly heterogenously T2 hyperintense, with mild restricted diffusion, T1 isointense, and mildly heterogenous enhancement, with foci of calcification most prominent along its inferomedial aspect. The mass measures 6.2 x 5 x 5 cm (CC by AP by TV) and there is suggestion of dural based attachment and surrounding CSF cleft. The mass abuts and likely invades the adjacent superior sagittal sinus which otherwise remains patent. There is minimal  adjacent parenchymal edema. There is mass effect on the left occipital lobe with narrowing of the left lateral ventricle, without herniation or significant midline shift. No visible abnormal parenchymal enhancement. No acute midline shift, extra axial fluid collection, hydrocephalus, or infarct. No subacute to old hemorrhage. Appropriate flow voids at the skull base and in the major venous sinuses otherwise. Mild mucosal changes in the paranasal sinuses. Mastoid air cells are essentially clear. Visualized globes and orbits appear unremarkable by MRI. No acute or aggressive appearing bone or extracranial soft tissue process.     Impression: Left occipital convexity extra-axial appearing mass measuring up to 6.2 cm, most likely a partially calcified meningioma. The mass abuts and likely invades the adjacent superior sagittal sinus which otherwise remains patent. Mass effect on the left occipital lobe with narrowing of the left lateral ventricle and without significant herniation or midline shift. Electronically Signed: Greg Sotelo MD  6/18/2025 10:26 PM EDT  Workstation ID: [id number]    CT Outside Head  Result Date: 6/18/2025  This procedure was auto-finalized with no dictation required.    CT Head Without Contrast  Result Date: 6/18/2025  EXAM: CT HEAD WO CONTRAST INDICATION: R51.9: Headache disorder; COMPARISON: None. TECHNIQUE: Axial CT imaging of the head. Axial images and multiplanar reformatted images reviewed.  Individualized dose optimization technique was used in order to meet ALARA standards for radiation dose reduction.  In addition to vendor specific dose reduction algorithms, the dose reduction techniques vary based on the specific scanner utilized but frequently include automated exposure control, adjustment of the mA and/or kV according to patient size, and use of iterative reconstruction technique. CONTRAST: None. FINDINGS: No acute intracranial hemorrhage. Approximately 4.8 x 4.4 x 5.9 cm mass in  the left occipital lobe with associated calcification. Mild mass effect on the posterior falx and posterior horn left lateral ventricle. Mild surrounding edema. Gray-white differentiation normal. No acute calvarial abnormalities.  No orbital abnormality. Paranasal sinuses and mastoid air cells clear.    Approximately 5 cm mass with associated calcification in the posterior left occipital lobe. MRI with contrast is recommended for further characterization. Electronically signed by Kashmir Perez MD                  Plan for Follow-up of Pending Labs/Results:   Pending Labs       Order Current Status    Tissue Pathology Exam Preliminary result          Discharge Details        Discharge Medications        New Medications        Instructions Start Date   acetaminophen 500 MG tablet  Commonly known as: TYLENOL   500 mg, Oral, Every 6 Hours PRN      HYDROcodone-acetaminophen 7.5-325 MG per tablet  Commonly known as: NORCO   1 tablet, Oral, Every 4 Hours PRN      levETIRAcetam 500 MG tablet  Commonly known as: KEPPRA   500 mg, Oral, 2 Times Daily      LORazepam 1 MG tablet  Commonly known as: Ativan   Take 1 tablet by mouth Every 8 (Eight) Hours As Needed for Anxiety for 2 days, THEN 1 tablet Every 12 (Twelve) Hours As Needed for up to 2 days.   Start Date: June 28, 2025     naloxone 4 MG/0.1ML nasal spray  Commonly known as: NARCAN   Call 911. Don't prime. Suches in 1 nostril for overdose. Repeat in 2-3 minutes in other nostril if no or minimal breathing/responsiveness.      pantoprazole 40 MG EC tablet  Commonly known as: PROTONIX   40 mg, Oral, Every Early Morning      polyethylene glycol 17 g packet  Commonly known as: MIRALAX   17 g, Oral, Daily             Stop These Medications      aspirin 81 MG EC tablet              No Known Allergies      Discharge Disposition:  Home or Self Care    Diet:  Hospital:  Diet Order   Procedures    Diet: Regular/House; Fluid Consistency: Thin (IDDSI 0)             Activity:      Restrictions or Other Recommendations:         CODE STATUS:    Code Status and Medical Interventions: CPR (Attempt to Resuscitate); Full   Ordered at: 06/25/25 1749     Code Status (Patient has no pulse and is not breathing):    CPR (Attempt to Resuscitate)     Medical Interventions (Patient has pulse or is breathing):    Full     Level Of Support Discussed With:    Patient       No future appointments.    Additional Instructions for the Follow-ups that You Need to Schedule       Discharge Follow-up with Specified Provider: Dr Stephen cool 3 weeks   As directed      To: Dr Stephen cool 3 weeks                      Whit Jean MD  06/28/25      Time Spent on Discharge:  I spent  45  minutes on this discharge activity which included: face-to-face encounter with the patient, reviewing the data in the system, coordination of the care with the nursing staff as well as consultants, documentation, and entering orders.

## 2025-06-29 ENCOUNTER — READMISSION MANAGEMENT (OUTPATIENT)
Dept: CALL CENTER | Facility: HOSPITAL | Age: 50
End: 2025-06-29
Payer: COMMERCIAL

## 2025-06-29 NOTE — OUTREACH NOTE
Prep Survey      Flowsheet Row Responses   [facility] facility patient discharged from? [place]   Is LACE score < 7 ? No   Eligibility Readm Mgmt   Discharge diagnosis Brain mass, Meningioma, CRANIOTOMY FOR TUMOR STEREOTACTIC WITH STEALTH, LEFT OCCIPITAL   Does the patient have one of the following disease processes/diagnoses(primary or secondary)? General Surgery   Does the patient have Home health ordered? No   Is there a DME ordered? No   Prep survey completed? Yes            MARIANELA JAIN - Registered Nurse

## 2025-06-30 DIAGNOSIS — F41.9 ANXIETY AND DEPRESSION: ICD-10-CM

## 2025-06-30 DIAGNOSIS — F32.A ANXIETY AND DEPRESSION: ICD-10-CM

## 2025-06-30 DIAGNOSIS — G93.89 BRAIN MASS: ICD-10-CM

## 2025-06-30 DIAGNOSIS — D32.9 MENINGIOMA: Primary | ICD-10-CM

## 2025-06-30 RX ORDER — DIAZEPAM 5 MG/1
TABLET ORAL
Qty: 21 TABLET | Refills: 0 | Status: SHIPPED | OUTPATIENT
Start: 2025-06-30 | End: 2025-07-14

## 2025-06-30 RX ORDER — LORAZEPAM 1 MG/1
1 TABLET ORAL EVERY 12 HOURS PRN
Qty: 10 TABLET | Refills: 0 | OUTPATIENT
Start: 2025-06-30 | End: 2025-07-05

## 2025-06-30 NOTE — TELEPHONE ENCOUNTER
Provider:  Stephen  Surgery/Procedure:  Craniotomy left  Surgery/Procedure Date:  6/25/25  Last visit:   NA  Next visit: 7/16/25     Reason for call:  Patient spouse called to request refill of Ativan. Has 4 tablets remaining.     Lon:    1 06/28/2025 [id number] Hydrocodone/Acetaminophen   325MG/7.5MG * Overlap *  Whit Jean [facility]   RETAIL PHARMACY  24.00 4 45 04 [facility] 06/28/2025 [facility]  1 06/28/2025 [id number] Lorazepam 1MG * Overlap * Whit Jean [facility]   RETAIL PHARMACY  10.00 4 04 [facility] 06/28/2025 [facility]

## 2025-07-01 LAB
CYTO UR: NORMAL
DX PRELIMINARY: NORMAL
LAB AP CASE REPORT: NORMAL
LAB AP CLINICAL INFORMATION: NORMAL
LAB AP DIAGNOSIS COMMENT: NORMAL
Lab: NORMAL
PATH REPORT.FINAL DX SPEC: NORMAL
PATH REPORT.GROSS SPEC: NORMAL

## 2025-07-03 ENCOUNTER — READMISSION MANAGEMENT (OUTPATIENT)
Dept: CALL CENTER | Facility: HOSPITAL | Age: 50
End: 2025-07-03
Payer: COMMERCIAL

## 2025-07-03 NOTE — OUTREACH NOTE
General Surgery Week 1 Survey      Flowsheet Row Responses   [facility] [place] facility patient discharged from? [place]   Does the patient have one of the following disease processes/diagnoses(primary or secondary)? General Surgery   Week 1 attempt successful? No   Unsuccessful attempts Attempt 1            Sofía Keller Registered Nurse

## 2025-07-09 ENCOUNTER — READMISSION MANAGEMENT (OUTPATIENT)
Dept: CALL CENTER | Facility: HOSPITAL | Age: 50
End: 2025-07-09
Payer: COMMERCIAL

## 2025-07-09 NOTE — OUTREACH NOTE
General Surgery Week 1 Survey      Flowsheet Row Responses   [facility] facility patient discharged from? [place]   Does the patient have one of the following disease processes/diagnoses(primary or secondary)? General Surgery   Week 1 attempt successful? No   Unsuccessful attempts Attempt 2            MARIANELA JAIN - Registered Nurse

## 2025-07-15 ENCOUNTER — HOSPITAL ENCOUNTER (OUTPATIENT)
Dept: ULTRASOUND IMAGING | Facility: HOSPITAL | Age: 50
Discharge: HOME | End: 2025-07-15
Payer: COMMERCIAL

## 2025-07-15 ENCOUNTER — TRANSCRIBE ORDERS (OUTPATIENT)
Facility: HOSPITAL | Age: 50
End: 2025-07-15

## 2025-07-15 DIAGNOSIS — R22.42 MASS OF LOWER LEG, LEFT: Primary | ICD-10-CM

## 2025-07-15 DIAGNOSIS — R22.42 MASS OF LOWER LEG, LEFT: ICD-10-CM

## 2025-07-15 PROCEDURE — 93971 EXTREMITY STUDY: CPT

## 2025-07-18 ENCOUNTER — TELEPHONE (OUTPATIENT)
Dept: NEUROSURGERY | Facility: CLINIC | Age: 50
End: 2025-07-18

## 2025-07-18 ENCOUNTER — HOSPITAL ENCOUNTER (OUTPATIENT)
Dept: CT IMAGING | Facility: HOSPITAL | Age: 50
Discharge: HOME | End: 2025-07-18
Payer: COMMERCIAL

## 2025-07-18 ENCOUNTER — OFFICE VISIT (OUTPATIENT)
Dept: NEUROSURGERY | Facility: CLINIC | Age: 50
End: 2025-07-18
Payer: COMMERCIAL

## 2025-07-18 VITALS
TEMPERATURE: 98.6 F | SYSTOLIC BLOOD PRESSURE: 138 MMHG | BODY MASS INDEX: 29.35 KG/M2 | DIASTOLIC BLOOD PRESSURE: 90 MMHG | HEIGHT: 70 IN | WEIGHT: 205 LBS

## 2025-07-18 DIAGNOSIS — L76.34 POSTOPERATIVE SEROMA OF SUBCUTANEOUS TISSUE AFTER NON-DERMATOLOGIC PROCEDURE: ICD-10-CM

## 2025-07-18 DIAGNOSIS — G93.89 BRAIN MASS: ICD-10-CM

## 2025-07-18 DIAGNOSIS — D32.9 MENINGIOMA: ICD-10-CM

## 2025-07-18 DIAGNOSIS — G93.89 BRAIN MASS: Primary | ICD-10-CM

## 2025-07-18 PROCEDURE — 70450 CT HEAD/BRAIN W/O DYE: CPT

## 2025-07-18 PROCEDURE — 99024 POSTOP FOLLOW-UP VISIT: CPT

## 2025-07-18 RX ORDER — LEVETIRACETAM 500 MG/1
500 TABLET ORAL 2 TIMES DAILY
Qty: 14 TABLET | Refills: 0 | Status: SHIPPED | OUTPATIENT
Start: 2025-07-18 | End: 2025-07-25

## 2025-07-18 RX ORDER — DIAZEPAM 5 MG/1
5 TABLET ORAL EVERY 12 HOURS PRN
Qty: 30 TABLET | Refills: 0 | Status: SHIPPED | OUTPATIENT
Start: 2025-07-18

## 2025-07-18 RX ORDER — HYDROCODONE BITARTRATE AND ACETAMINOPHEN 7.5; 325 MG/1; MG/1
TABLET ORAL
COMMUNITY
Start: 2025-07-15

## 2025-07-18 RX ORDER — IBUPROFEN 200 MG
200 TABLET ORAL EVERY 6 HOURS PRN
COMMUNITY

## 2025-07-18 RX ORDER — LORAZEPAM 0.5 MG/1
0.5 TABLET ORAL
COMMUNITY
Start: 2025-07-03

## 2025-07-18 NOTE — PROGRESS NOTES
"Evelia Ryan  1975 07/18/2025  [id number]    CC: Status post-op left occipital craniotomy for resection of brain tumor (meningioma)     HPI: Evelia Ryan is a 50 y.o. female who is 3 weeks status post-op left occipital craniotomy for resection of brain tumor concerning for meningioma with Dr. Mitchell and preoperative angiography with embolization with Dr. Gu on the day prior to surgery. Preoperatively, patient was experiencing headaches and blurry vision. She was admitted to our facility on 6/19/2025 and MRI of the brain revealed large left occipital mass. Postoperatively, patient reports ongoing \"head pressure\" specifically behind her left eye, neck stiffness, and headaches. She also reports that her vision still feels blurry at times and has not improved much postoperatively.  She is still taking Keppra and has finished her steroid taper. She is not taking her Valium we prescribed upon discharge for muscle spasms at this time but is taking her Norco 7.5 for breakthrough pain about twice daily. She does report some left leg swelling for the past week and reached out to her PCP who ordered a venous duplex that ruled out DVT.She denies any constitutional signs concerning for infection. She is here today for her 3 week postoperative incision check.      Past Medical History:   Diagnosis Date    Anxiety and depression 06/17/2014       No Known Allergies      Current Outpatient Medications:     acetaminophen (TYLENOL) 500 MG tablet, Take 1 tablet by mouth Every 6 (Six) Hours As Needed for Mild Pain or Moderate Pain., Disp: , Rfl:     HYDROcodone-acetaminophen (NORCO) 7.5-325 MG per tablet, , Disp: , Rfl:     ibuprofen (ADVIL,MOTRIN) 200 MG tablet, Take 1 tablet by mouth Every 6 (Six) Hours As Needed for Mild Pain., Disp: , Rfl:     levETIRAcetam (KEPPRA) 500 MG tablet, Take 1 tablet by mouth 2 (Two) Times a Day for 7 days., Disp: 14 tablet, Rfl: 0    LORazepam (ATIVAN) 0.5 MG tablet, 1 tablet., Disp: , " Rfl:     naloxone (NARCAN) 4 MG/0.1ML nasal spray, Call 911. Don't prime. [place] in 1 nostril for overdose. Repeat in 2-3 minutes in other nostril if no or minimal breathing/responsiveness., Disp: 2 each, Rfl: 0    pantoprazole (PROTONIX) 40 MG EC tablet, Take 1 tablet by mouth Every Morning., Disp: 30 tablet, Rfl: 0    polyethylene glycol (MIRALAX) 17 GM/SCOOP powder, Mix 17 g (1 capful) of powder in 8 oz of water and take by mouth Daily., Disp: 510 g, Rfl: 0    diazePAM (VALIUM) 5 MG tablet, Take 1 tablet by mouth Every 12 (Twelve) Hours As Needed for Muscle Spasms., Disp: 30 tablet, Rfl: 0    Review of Systems   Constitutional:  Negative for activity change, appetite change, chills, diaphoresis, fatigue, fever and unexpected weight change.   HENT:  Negative for congestion, dental problem, drooling, ear discharge, ear pain, facial swelling, hearing loss, mouth sores, nosebleeds, postnasal drip, rhinorrhea, sinus pressure, sinus pain, sneezing, sore throat, tinnitus, trouble swallowing and voice change.    Eyes:  Positive for visual disturbance. Negative for photophobia, pain, discharge, redness and itching.   Respiratory:  Negative for apnea, cough, choking, chest tightness, shortness of breath, wheezing and stridor.    Cardiovascular:  Negative for chest pain, palpitations and leg swelling.   Gastrointestinal:  Negative for abdominal distention, abdominal pain, anal bleeding, blood in stool, constipation, diarrhea, nausea, rectal pain and vomiting.   Endocrine: Negative for cold intolerance, heat intolerance, polydipsia, polyphagia and polyuria.   Genitourinary:  Negative for decreased urine volume, difficulty urinating, dyspareunia, dysuria, enuresis, flank pain, frequency, genital sores, hematuria, menstrual problem, pelvic pain, urgency, vaginal bleeding, vaginal discharge and vaginal pain.   Musculoskeletal:  Positive for joint swelling. Negative for arthralgias, back pain, gait problem, myalgias, neck pain  "and neck stiffness.   Skin:  Negative for color change, pallor, rash and wound.   Allergic/Immunologic: Negative for environmental allergies, food allergies and immunocompromised state.   Neurological:  Positive for headaches. Negative for dizziness, tremors, seizures, syncope, facial asymmetry, speech difficulty, weakness, light-headedness and numbness.   Hematological:  Negative for adenopathy. Does not bruise/bleed easily.   Psychiatric/Behavioral:  Negative for agitation, behavioral problems, confusion, decreased concentration, dysphoric mood, hallucinations, self-injury, sleep disturbance and suicidal ideas. The patient is not nervous/anxious and is not hyperactive.          PE:  /90 (BP Location: Left arm, Patient Position: Sitting, Cuff Size: Adult)   Temp 98.6 °F (37 °C) (Infrared)   Ht 177.8 cm (70\")   Wt 93 kg (205 lb)   BMI 29.41 kg/m²     Physical Exam:   General: Pleasant and conversant. No acute distress.   Motor function intact in the bilateral upper and lower extremities. She does have decrease ROM in her neck and is favoring the left side.   Sensation intact in the bilateral upper and lower extremities.   Incision pictured below. Incision is healing nicely, there is a  moderately sized left sided seroma. I was able to clean her incision and remove 34 total staples today. There was no sign of redness or drainage. She is slightly tender with a small area of what appears to be a postoperative seroma along the left lower aspect of her incision and at the area of her prior drain site. No signs of dehiscence. Pictured below is incision before and after staple removal. Right radial incision site from previous embolization with Dr. Gu looks as if it is healing nicely as well.       Social History    Tobacco Use      Smoking status: Never        Passive exposure: Never      Smokeless tobacco: Never       Tobacco Use: Low Risk  (7/18/2025)    Patient History     Smoking Tobacco Use: Never     " Smokeless Tobacco Use: Never     Passive Exposure: Never         STEADI Fall Risk Assessment has not been completed.        Diagnoses and all orders for this visit:    1. Brain mass (Primary)  -     CT Head Without Contrast; Future  -     Ambulatory Referral to Radiation Oncology  -     Cancel: MRI Lumbar Spine With & Without Contrast; Future  -     MRI Brain With & Without Contrast; Future    2. Meningioma  -     CT Head Without Contrast; Future  -     Ambulatory Referral to Radiation Oncology  -     Cancel: MRI Lumbar Spine With & Without Contrast; Future  -     MRI Brain With & Without Contrast; Future    3. Postoperative seroma of subcutaneous tissue after non-dermatologic procedure    Other orders  -     levETIRAcetam (KEPPRA) 500 MG tablet; Take 1 tablet by mouth 2 (Two) Times a Day for 7 days.  Dispense: 14 tablet; Refill: 0       Assessment/Plan  This is a 50 y.o. female who is 3 weeks s/p left occipital craniotomy for removal of large occipital mass with Dr. Mitchell on 6/25/2025. She also underwent preoperative angiography and arterial/venous mapping, along with embolization with Dr. Gu on 6/24/25. Intraoperative pathology consistent with meningioma. Post-operative MRI during her hospitalization revealed small amount of residual tumor surrounding the sagittal sinus but overall stable. She was discharged home from the hospital on 6/28/25 with Keppra 500 BID (plan for 30 days postop for seizure prophylaxis), steroid taper, Norco, and Valium. She presents today to review her final pathology results and for a postoperative incision check. Of note, she has noticed some left leg swelling for the past week and underwent duplex ordered by her PCP that was negative for DVT. She reports doing our visit today ongoing postoperative headaches, subjective vision changes/blurred vision, and worsening pressure behind her left eye. Out of an abundance of precaution, I am going to order a repeat head CT today to rule out  any underlying cerebral edema/mass effect, stroke, abscess, or brain bleed contributing to her headaches/eye pressure. I will call her this afternoon with the results of her head CT. I am going to send her in one more weeks supply of Keppra along with a refill on her Valium to help with postoperative neck pain and muscle spasms. She can begin to wean off of her narcotic, as I suspect the majority of her headaches are stemming from her surgery and muscle spasms from intraoperative positioning. She does have a small area of fluid collection along the left inferior aspect of her incision site, consistent with a postoperative seroma but otherwise her incision is healing nicely. I did remove her staples today. I am also going to place a referral to radiation oncology to discuss further radiation treatment for her residual tumor seen on her postoperative MRI. I would like for her to follow up in 3 months, or sooner if clinically indicated, with a new postoperative MRI of the brain with and without contrast. Activities and restrictions were discussed. Wound care was discussed with the patient. Signs and symptoms that would warrant further urgent/emergent workup, specifically those associated with cortical irritation and mass effect, or for her to reach out to our office reviewed today. I did review all postoperative imaging with the patient and her  today, all questions answered. Patient encouraged to contact us if she has any changes in her condition or any concerns.    Any copied data from previous notes included in the (1) HPI, (2) PE, (3) MDM and/or Assessment and Plan has been reviewed and is accurate as of 07/18/25     Jaleesa Carlson PA-C  07/18/25

## 2025-07-18 NOTE — TELEPHONE ENCOUNTER
I spoke with Ms. Ryan on the telephone this afternoon to review her head CT. There are expected postoperative changes along the left occipital craniotomy site with mass resection. No evidence of bleed, mass effect, or infarct. I encouraged her to call our office or seek further urgent/emergent care if her symptoms begin to worsen at any point in time. She verbally agreed.     Jaleesa Carlson PA-C

## 2025-07-21 ENCOUNTER — READMISSION MANAGEMENT (OUTPATIENT)
Dept: CALL CENTER | Facility: HOSPITAL | Age: 50
End: 2025-07-21
Payer: COMMERCIAL

## 2025-07-21 NOTE — OUTREACH NOTE
General Surgery Week 3 Survey      Flowsheet Row Responses   [facility] patient discharged from? [place]   Does the patient have one of the following disease processes/diagnoses(primary or secondary)? General Surgery   Week 3 attempt successful? No   Unsuccessful attempts Attempt 1   Revoke Decline to participate            LUDMILA NIELSEN - Registered Nurse

## 2025-07-23 ENCOUNTER — HOSPITAL ENCOUNTER (OUTPATIENT)
Facility: HOSPITAL | Age: 50
Setting detail: RADIATION/ONCOLOGY SERIES
End: 2025-07-23
Payer: COMMERCIAL

## 2025-07-24 ENCOUNTER — DOCUMENTATION (OUTPATIENT)
Dept: OTHER | Facility: HOSPITAL | Age: 50
End: 2025-07-24
Payer: COMMERCIAL

## 2025-07-24 ENCOUNTER — OFFICE VISIT (OUTPATIENT)
Age: 50
End: 2025-07-24
Payer: COMMERCIAL

## 2025-07-24 VITALS
HEIGHT: 70 IN | OXYGEN SATURATION: 96 % | HEART RATE: 65 BPM | BODY MASS INDEX: 29.35 KG/M2 | RESPIRATION RATE: 16 BRPM | TEMPERATURE: 98.1 F | WEIGHT: 205 LBS

## 2025-07-24 DIAGNOSIS — D32.9 MENINGIOMA: Primary | ICD-10-CM

## 2025-07-24 PROCEDURE — G0463 HOSPITAL OUTPT CLINIC VISIT: HCPCS

## 2025-07-24 NOTE — PROGRESS NOTES
Distress Screening Follow-up    Name: Evelia Ryan    : 1975    Diagnosis: Meningioma    Location of Distress Screening: Radiation Oncology    Distress Level: 5 (2025 12:59 PM)      Physical Concerns:  Sleep: Y  Fatigue: Y  Memory or concentration: Y  Loss or change of physical abilities: Y  Pain: Y      Emotional Concerns:  Worry or anxiety: Y       Interventions:    N/A    Comments:   SW met with pt after radiation consultation to provide support and assistance with psychosocial needs. Pt was in good spirits, and communicated no needs at this time. SW provided overview of role and resources available, and provided contact information should needs arise. Pt thanked SW for the visit and was agreeable to reach out as needed. SW will be available ongoing.

## 2025-07-24 NOTE — PROGRESS NOTES
CONSULTATION NOTE      :                                                          1975  DATE OF CONSULTATION:                       2025   REQUESTING PHYSICIAN:                   Benito Mitchell*  REASON FOR CONSULTATION:           Left occipital meningioma         BRIEF HISTORY:  The patient is a very pleasant 50 y.o. female  with a partially resected meningioma.  She presented with headaches, vision change, lethargy, dizziness and difficulty with short-term memory.  MRI 2025 identified a 5.3 x 4.4 cm homogeneously enhancing mass in the left occipital lobe of the brain abutting the and likely adherent to the adjacent inferior aspect of the sagittal sinus.  She underwent craniotomy 2025 with removal of the majority of the tumor, leaving only a small amount of unresectable disease around the posterior sagittal sinus.    Pathology showed fibrous meningioma, WHO grade 1.    Postoperative MRI 2025 showed postsurgical changes with removal of most of the mass.  There was a residual 1.8 x 1.2 cm focus of enhancing tumor abutting and likely adherent to the sagittal sinus.  The sinus appeared still patent and the ventricles were normal in size and configuration.  Postoperatively, she has been healing well.      Allergy: No Known Allergies    Social History:   Social History     Socioeconomic History    Marital status:    Tobacco Use    Smoking status: Former     Current packs/day: 0.00     Average packs/day: 2.0 packs/day for 8.0 years (16.0 ttl pk-yrs)     Types: Cigarettes     Start date:      Quit date: 2000     Years since quittin.5     Passive exposure: Never    Smokeless tobacco: Never   Vaping Use    Vaping status: Never Used   Substance and Sexual Activity    Alcohol use: Never    Drug use: Never    Sexual activity: Defer       Past Medical History:   Past Medical History:   Diagnosis Date    Anxiety and depression 2014    Arthritis     Meningioma   "      Family History: family history is not on file.     Surgical History:   Past Surgical History:   Procedure Laterality Date    APPENDECTOMY      CEREBRAL ANGIOGRAM N/A 06/24/2025    Procedure: Cerebral angiogram with possible embolization - Right radial access;  Surgeon: Roney Gu MD;  Location: [facility] CATH INVASIVE LOCATION;  Service: Interventional Radiology;  Laterality: N/A;    CHOLECYSTECTOMY      CRANIOTOMY FOR TUMOR Left 06/25/2025    Procedure: CRANIOTOMY FOR TUMOR STEREOTACTIC WITH STEALTH, LEFT OCCIPITAL;  Surgeon: Benito Mitchell MD;  Location: [facility] OR;  Service: Neurosurgery;  Laterality: Left;    EAR TUBES      GASTRIC SLEEVE LAPAROSCOPIC      HYSTERECTOMY      Partial hysterectomy in approx 2005    KNEE ACL RECONSTRUCTION Bilateral         Review of Systems:   Review of Systems   Constitutional:  Positive for fatigue. Negative for appetite change, chills, fever and unexpected weight change.   HENT:   Positive for hearing loss.    Eyes:  Positive for eye problems.   Respiratory: Negative.     Cardiovascular:  Positive for leg swelling. Negative for chest pain.   Gastrointestinal: Negative.    Endocrine: Positive for hot flashes.   Genitourinary:  Positive for nocturia (2-4 times per night).    Musculoskeletal:  Positive for arthralgias, gait problem, myalgias, neck pain and neck stiffness.   Skin:  Positive for wound (healing craniotomy incision).   Neurological:  Positive for dizziness, extremity weakness (Generalized), gait problem, headaches, numbness and speech difficulty. Negative for seizures.   Psychiatric/Behavioral:  Positive for decreased concentration and sleep disturbance. The patient is nervous/anxious.         Short term memory issues              Objective   VITAL SIGNS:   Vitals:    07/24/25 1317   Pulse: 65   Resp: 16   Temp: 98.1 °F (36.7 °C)   TempSrc: Oral   SpO2: 96%   Weight: 93 kg (205 lb)   Height: 177.8 cm (70\")   PainSc: 5    PainLoc: Head    "     Karnofsky score: 80       Physical Exam:   Physical Exam  Vitals and nursing note reviewed.   Constitutional:       Appearance: She is well-developed.   HENT:      Head:      Comments: Well-healing posterior craniotomy incision.  No drainage.  Cardiovascular:      Rate and Rhythm: Normal rate and regular rhythm.      Heart sounds: Normal heart sounds. No murmur heard.  Pulmonary:      Effort: Pulmonary effort is normal.      Breath sounds: Normal breath sounds. No wheezing or rales.   Abdominal:      General: Bowel sounds are normal. There is no distension.      Palpations: Abdomen is soft.      Tenderness: There is no abdominal tenderness.   Musculoskeletal:         General: No tenderness. Normal range of motion.      Cervical back: Normal range of motion and neck supple.   Lymphadenopathy:      Cervical: No cervical adenopathy.      Upper Body:      Right upper body: No supraclavicular adenopathy.      Left upper body: No supraclavicular adenopathy.   Skin:     General: Skin is warm and dry.   Neurological:      Mental Status: She is alert and oriented to person, place, and time.      Sensory: No sensory deficit.   Psychiatric:         Behavior: Behavior normal.         Thought Content: Thought content normal.         Judgment: Judgment normal.              The following portions of the patient's history were reviewed and updated as appropriate: allergies, current medications, past family history, past medical history, past social history, past surgical history, and problem list.    Assessment:   Assessment      Left occipital low-grade meningioma.  This was a large tumor which abutted the posterior sagittal sinus.   She is almost 1 month status post craniotomy.  Postoperative radiotherapy would be appropriate treatment for the small focus of residual unresectable tumor involving the sagittal sinus.  She will require some additional time for healing.    RECOMMENDATIONS:   If we are only targeting the small  residual tumor, this would be best treated with fractionated stereotactic radiotherapy on the CyberKnife to deliver a dose of approximately 30 Villa in 5 sessions.  If the entire resection cavity needs treatment, she may require a more prolonged course of IMRT.  She is currently scheduled for repeat MRI in 3 months.  I will discuss with Dr. Mitchell the timing and extent of radiotherapy target.    I spent a total of 45 minutes on todays visit, with more than 30 minutes in direct face to face communication, and the remainder of the time spent in reviewing the relevant history, records, available imaging, and for documentation.    Follow Up:   Return in about 3 months (around 10/29/2025) for Office Visit, Simulation.  Diagnoses and all orders for this visit:    1. Meningioma (Primary)  -     Ambulatory Referral to ONC Social Work         Hiram Carmona MD

## 2025-07-24 NOTE — PROGRESS NOTES
"FOLLOW UP NOTE    PATIENT:                                                      Evelia Ryan  MEDICAL RECORD #:                        [id number]  :                                                          1975  COMPLETION DATE:   ***  DIAGNOSIS:     No matching staging information was found for the patient.      BRIEF HISTORY:    ***    MEDICATIONS: Medication reconciliation for the patient was reviewed and confirmed in the electronic medical record.    Review of Systems - Oncology    Karnofsky score: 80     Physical Exam    VITAL SIGNS:   Vitals:    25 1317   Pulse: 65   Resp: 16   Temp: 98.1 °F (36.7 °C)   TempSrc: Oral   SpO2: 96%   Weight: 93 kg (205 lb)   Height: 177.8 cm (70\")   PainSc: 5    PainLoc: Head             Karnofsky score: 80     {KSP (Optional):07630}    The following portions of the patient's history were reviewed and updated as appropriate: allergies, current medications, past family history, past medical history, past social history, past surgical history and problem list.         Diagnoses and all orders for this visit:    1. Meningioma (Primary)  -     Ambulatory Referral to ONC Social Work         IMPRESSION:  ***    RECOMMENDATIONS:  ***    I spent a total of *** minutes on todays visit, with more than *** minutes in direct face to face communication, and the remainder of the time spent in reviewing the relevant history, records, available imaging, and for documentation.    No follow-ups on file.    Aura Pinzon RN        "

## 2025-07-31 ENCOUNTER — TELEPHONE (OUTPATIENT)
Dept: NEUROSURGERY | Facility: CLINIC | Age: 50
End: 2025-07-31
Payer: COMMERCIAL

## 2025-07-31 NOTE — TELEPHONE ENCOUNTER
Provider:  Pepito  Surgery/Procedure:  Craniotomy left occipital  Surgery/Procedure Date:  6/25/25  Last visit:   7/18/25  Next visit: 10/27/25     Reason for call:  Patient left a voice mail requesting pain medication refill. I attempted to call back twice but the call is forwarded to voicemail, and voicemail is not set up. Her messages stated that we had offered to refill some pain medication, but she declined at the time deciding to take over the counter medications. She stated in her message that Tylenol and ibuprofen have not been giving adequate relief.     Lon:    1 07/20/2025 [id number] Diazepam 5MG [facility] 30.00 15 04 [place]  [facility]  1 07/15/2025 [id number] Hydrocodone/Acetaminophen   325MG/7.5MG * Overlap *  [facility] 24.00 4 45 04 [place]  New 07/15/2025 [facility]  1 07/03/2025 [id number] Lorazepam 0.5MG * Overlap * [facility] 30.00 15 04 [place]  New 07/03/2025 [facility]  1 06/30/2025 [id number] Diazepam 5MG * Overlap * [facility] 21.00 14 04 [place]  New 06/30/2025 [facility]

## 2025-08-01 DIAGNOSIS — D32.9 MENINGIOMA: Primary | ICD-10-CM

## 2025-08-01 RX ORDER — PANTOPRAZOLE SODIUM 40 MG/1
40 TABLET, DELAYED RELEASE ORAL
Qty: 21 TABLET | Refills: 0 | Status: SHIPPED | OUTPATIENT
Start: 2025-08-01

## 2025-08-01 RX ORDER — TRAMADOL HYDROCHLORIDE 50 MG/1
50 TABLET ORAL EVERY 6 HOURS PRN
Qty: 30 TABLET | Refills: 0 | Status: SHIPPED | OUTPATIENT
Start: 2025-08-01

## 2025-08-01 RX ORDER — HYDROCODONE BITARTRATE AND ACETAMINOPHEN 7.5; 325 MG/1; MG/1
TABLET ORAL
Refills: 0 | Status: CANCELLED | OUTPATIENT
Start: 2025-08-01

## 2025-08-01 NOTE — TELEPHONE ENCOUNTER
Caller: Evelia Ryan    Relationship: Self    Best call back number: [phone]    Requested Prescriptions:   Requested Prescriptions     Pending Prescriptions Disp Refills    HYDROcodone-acetaminophen (NORCO) 7.5-325 MG per tablet  0    pantoprazole (PROTONIX) 40 MG EC tablet 30 tablet 0     Sig: Take 1 tablet by mouth Every Morning.        Pharmacy where request should be sent: [id number] - [place], [place] - [address] AT [facility] [address]  - [phone]  - [phone] FX     Last office visit with prescribing clinician: 7/18/2025   Last telemedicine visit with prescribing clinician: Visit date not found   Next office visit with prescribing clinician: Visit date not found     Additional details provided by patient: NONE LEFT    Does the patient have less than a 3 day supply:  [x] Yes  [] No    Would you like a call back once the refill request has been completed: [] Yes [x] No    If the office needs to give you a call back, can they leave a voicemail: [] Yes [x] No    Juan F Blank Rep   08/01/25 10:00 EDT       PT STATES SHE WAS OFFERED A REFILL ON HYDROCODONE AT LAST VISIT WITH [facility] BUT SHE DECLINED BUT NOW STATES OFF THE SHELF MEDS ARE NOT HELPING WITH THE PAIN.

## (undated) DEVICE — KT CATH GUIDE BENCHMARK 071 STR 95CM W/CATH SELECT 5F

## (undated) DEVICE — PENCL ROCKRSWCH MEGADYNE W/HOLSTR 10FT SS

## (undated) DEVICE — GLV SURG DERMASSURE GRN LF PF SZ 6.5

## (undated) DEVICE — DRV BATRY MATRIXPRO STRL

## (undated) DEVICE — SPHR MARKR STEALTH STATION

## (undated) DEVICE — CRANIOTOMY DRAPE, STERILE: Brand: MEDLINE

## (undated) DEVICE — INTENDED FOR TISSUE SEPARATION, AND OTHER PROCEDURES THAT REQUIRE A SHARP SURGICAL BLADE TO PUNCTURE OR CUT.: Brand: BARD-PARKER ® STAINLESS STEEL BLADES

## (undated) DEVICE — CATH IV ANGIOCATH FEP 14GA 1.88IN ORNG

## (undated) DEVICE — CUSA® CLARITY 36KHZ TORQUE WRENCH: Brand: CUSA® CLARITY

## (undated) DEVICE — CATH MIC RENEGADE 2MARK 3F 10X150CM

## (undated) DEVICE — IRRIGATOR BULB ASEPTO 60CC STRL

## (undated) DEVICE — SURGUARD3 SAFETY HYPODERMIC NEEDLE: Brand: SURGUARD3

## (undated) DEVICE — GLIDESHEATH SLENDER STAINLESS STEEL KIT: Brand: GLIDESHEATH SLENDER

## (undated) DEVICE — RADIFOCUS GLIDEWIRE: Brand: GLIDEWIRE

## (undated) DEVICE — INTENDED USE FOR SURGICAL MARKING ON INTACT SKIN, ALSO PROVIDES A PERMANENT METHOD OF IDENTIFYING OBJECTS IN THE OPERATING ROOM: Brand: WRITESITE® REGULAR TIP SKIN MARKER

## (undated) DEVICE — NEURO SPONGES: Brand: DEROYAL

## (undated) DEVICE — TOOL MR8-10MH30 MR8 10CM MATCH 3MM: Brand: MIDAS REX MR8

## (undated) DEVICE — SOFT PRE-SHAPED GUIDEWIRE WITH HYDROPHILIC COATING: Brand: SYNCHRO SELECT

## (undated) DEVICE — GLV SURG PREMIERPRO MIC LTX PF SZ6.5 BRN

## (undated) DEVICE — CANISTER, RIGID, 2000CC: Brand: MEDLINE INDUSTRIES, INC.

## (undated) DEVICE — JP PERF DRN SIL FLT 7MM FULL: Brand: CARDINAL HEALTH

## (undated) DEVICE — SUT VICYL 2/0 CR8 18IN DYED J726D

## (undated) DEVICE — GW STARTER ROSEN PTFE/COAT J/TP/1.5MM 0.035IN 3X260CM

## (undated) DEVICE — STPCK 3/WY HP M/RA W/OFF/HNDL 1050PSI STRL

## (undated) DEVICE — TOOL MR8-F2/7TA23 MR8 F2/7CM TAPER 2.3MM: Brand: MIDAS REX MR8

## (undated) DEVICE — CATH SELCT NEURON SIM 5F 130CM

## (undated) DEVICE — ELECTRD NDL EZ CLN MOD 2.75IN

## (undated) DEVICE — TOOL MR8-7TA11 MR8 7CM TAPER 1.1MM: Brand: MIDAS REX MR8

## (undated) DEVICE — REMOV ADHS SKIN DETACHOL LIQ .5OZ

## (undated) DEVICE — MAYFIELD® DISPOSABLE ADULT SKULL PIN (PLASTIC BASE): Brand: MAYFIELD®

## (undated) DEVICE — TR BAND RADIAL ARTERY COMPRESSION DEVICE: Brand: TR BAND

## (undated) DEVICE — DRESSING,GAUZE,XEROFORM,CURAD,1"X8",ST: Brand: CURAD

## (undated) DEVICE — ROTATING HEMOSTATIC VALVE .096": Brand: RHV

## (undated) DEVICE — TAPE,CLOTH/SILK,CURAD,3"X10YD,LF,40/CS: Brand: CURAD

## (undated) DEVICE — TOWEL,OR,DSP,ST,BLUE,STD,4/PK,20PK/CS: Brand: MEDLINE

## (undated) DEVICE — SUT NUROLON 4/0 TF18 CR8 I8IN C584D

## (undated) DEVICE — PERFORATOR CRANI 14MM 11MM

## (undated) DEVICE — DRAPE,INSTRUMENT,MAGNETIC,10X16: Brand: MEDLINE

## (undated) DEVICE — STRAP POSTN KN/BDY FM 5X72IN DISP

## (undated) DEVICE — LEX NEURO ANGIOGRAPHY: Brand: MEDLINE INDUSTRIES, INC.

## (undated) DEVICE — CONN TBG Y 5 IN 1 LF STRL

## (undated) DEVICE — GLV SURG DERMASSURE GRN LF PF 7.0

## (undated) DEVICE — CVR PROB ULTRASND/TRANSD W/GEL 7X11IN STRL

## (undated) DEVICE — GLV SURG BIOGEL LTX PF 8

## (undated) DEVICE — PK CRANI 10

## (undated) DEVICE — BATTERY PK FOR POWER DRIVER SYNTHES STRL

## (undated) DEVICE — RADIFOCUS TORQUE DEVICE MULTI-TORQUE VISE: Brand: RADIFOCUS TORQUE DEVICE

## (undated) DEVICE — CUSA® CLARITY 36KHZ CURVED EXTENDED STANDARD TIP: Brand: CUSA® CLARITY

## (undated) DEVICE — APPL DURAPREP IODOPHOR APL 26ML

## (undated) DEVICE — LIMB HOLDER, WRIST/ANKLE: Brand: DEROYAL

## (undated) DEVICE — CUSA® CLARITY SPECIMEN TRAP (SOCK INSERT): Brand: CUSA® CLARITY

## (undated) DEVICE — SYR LUERLOK 30CC

## (undated) DEVICE — RESERVOIR,SUCTION,100CC,SILICONE: Brand: MEDLINE

## (undated) DEVICE — RUBBERBAND LF STRL PK/2

## (undated) DEVICE — DRESSING, SURESITE SELECT, 2.8'X3.50': Brand: MEDLINE

## (undated) DEVICE — DISPOSABLE BIPOLAR FORCEPS 7 3/4" (19.7CM) SCOVILLE BAYONET, INSULATED, 1.5MM TIP AND 12 FT. (3.6M) CABLE: Brand: KIRWAN

## (undated) DEVICE — UNDERGLV SURG BIOGEL INDICAT PI SZ8.5 BLU

## (undated) DEVICE — ELECTRD BLD EZ CLN STD 2.5IN

## (undated) DEVICE — SPNG GZ WOVN 4X4IN 12PLY 10/BX STRL

## (undated) DEVICE — BLANKT WARM LOWR/BDY 100X120CM

## (undated) DEVICE — ELECTRD BLD EZ CLN MOD XLNG 2.75IN

## (undated) DEVICE — DETACHMENT SYSTEM: Brand: INZONE

## (undated) DEVICE — SYR CONTRL LUERLOK 10CC

## (undated) DEVICE — CUSA® CLARITY QUICK CONNECT CARTRIDGE AND TUBING SET: Brand: CUSA® CLARITY